# Patient Record
Sex: FEMALE | Race: WHITE | Employment: FULL TIME | URBAN - NONMETROPOLITAN AREA
[De-identification: names, ages, dates, MRNs, and addresses within clinical notes are randomized per-mention and may not be internally consistent; named-entity substitution may affect disease eponyms.]

---

## 2017-01-05 ENCOUNTER — MED REC SCAN ONLY (OUTPATIENT)
Dept: FAMILY MEDICINE CLINIC | Facility: CLINIC | Age: 57
End: 2017-01-05

## 2017-04-04 ENCOUNTER — OFFICE VISIT (OUTPATIENT)
Dept: FAMILY MEDICINE CLINIC | Facility: CLINIC | Age: 57
End: 2017-04-04

## 2017-04-04 VITALS
HEART RATE: 73 BPM | HEIGHT: 63 IN | TEMPERATURE: 99 F | DIASTOLIC BLOOD PRESSURE: 80 MMHG | BODY MASS INDEX: 21.17 KG/M2 | WEIGHT: 119.5 LBS | OXYGEN SATURATION: 99 % | SYSTOLIC BLOOD PRESSURE: 110 MMHG

## 2017-04-04 DIAGNOSIS — B34.9 VIRAL SYNDROME: Primary | ICD-10-CM

## 2017-04-04 PROCEDURE — 99213 OFFICE O/P EST LOW 20 MIN: CPT | Performed by: FAMILY MEDICINE

## 2017-04-04 RX ORDER — CODEINE PHOSPHATE AND GUAIFENESIN 10; 100 MG/5ML; MG/5ML
SOLUTION ORAL
Qty: 120 ML | Refills: 0 | Status: SHIPPED | OUTPATIENT
Start: 2017-04-04 | End: 2018-01-31 | Stop reason: ALTCHOICE

## 2017-04-04 NOTE — PROGRESS NOTES
Shavon Stallings is a 59year old female. Patient presents with: Other: sore throat, headache, fever,cough-did strep test in [place], came back negative. ..started on 4/1/17---room 1      HPI:   Patient is a .   She was recently in [place] Pharynx normal  NECK: supple, no cervical adenopathy  LUNGS: clear to auscultation  CARDIO: RRR without murmur  ABD soft, nontender, normal BS, no masses, rebound or guarding. No organomegaly or CVA tenderness.   EXTREMITIES: no edema          ASSESSMENT AN

## 2018-01-22 ENCOUNTER — TELEPHONE (OUTPATIENT)
Dept: FAMILY MEDICINE CLINIC | Facility: CLINIC | Age: 58
End: 2018-01-22

## 2018-01-22 NOTE — TELEPHONE ENCOUNTER
Pt wants the shingles and pneumonia shot, does she need to see [facility] or can she get a nurse appt? Her ins will cover some of the shots.

## 2018-01-22 NOTE — TELEPHONE ENCOUNTER
Patient has not been seen since April 2017  I will advise that she needs to be seen.        She will confirm with her insurance that they will be covered and then will call back to make an appointment

## 2018-01-31 ENCOUNTER — OFFICE VISIT (OUTPATIENT)
Dept: FAMILY MEDICINE CLINIC | Facility: CLINIC | Age: 58
End: 2018-01-31

## 2018-01-31 VITALS
TEMPERATURE: 99 F | BODY MASS INDEX: 21.62 KG/M2 | HEIGHT: 63 IN | SYSTOLIC BLOOD PRESSURE: 118 MMHG | WEIGHT: 122 LBS | DIASTOLIC BLOOD PRESSURE: 80 MMHG

## 2018-01-31 DIAGNOSIS — J02.9 PHARYNGITIS, UNSPECIFIED ETIOLOGY: Primary | ICD-10-CM

## 2018-01-31 PROCEDURE — 99213 OFFICE O/P EST LOW 20 MIN: CPT | Performed by: FAMILY MEDICINE

## 2018-01-31 PROCEDURE — 87081 CULTURE SCREEN ONLY: CPT | Performed by: FAMILY MEDICINE

## 2018-01-31 RX ORDER — AZITHROMYCIN 250 MG/1
TABLET, FILM COATED ORAL
Qty: 6 TABLET | Refills: 0 | Status: SHIPPED | OUTPATIENT
Start: 2018-01-31 | End: 2018-02-05

## 2018-01-31 NOTE — PROGRESS NOTES
Rizwan Julien is a 59year old female. Patient presents with: Other: sore throat, low grade fever, nothing over 100., started 1/19/18. Sons girlfriend has strep. room 1. HPI:   Patient has had a sore throat, low-grade fever.   Been exposed t CVA tenderness. EXTREMITIES: no edema          ASSESSMENT AND PLAN:     Pharyngitis, unspecified etiology  (primary encounter diagnosis)    Treat symptomatically. Rest, fluids and Tylenol.   Discussed danger signs including increased fever,chills, SOB and

## 2018-02-02 ENCOUNTER — TELEPHONE (OUTPATIENT)
Dept: FAMILY MEDICINE CLINIC | Facility: CLINIC | Age: 58
End: 2018-02-02

## 2018-02-02 NOTE — TELEPHONE ENCOUNTER
Pt calling back for strep test results, wants to know if these are in. Wants to get her allergy shots today and wondering if this will interfere.

## 2018-02-02 NOTE — TELEPHONE ENCOUNTER
Result are still pending   Called the patient -     She is still going to go have her allergy shots  She wants to go see her mom this weekend and wants to make sure that she will be ok to go since she has been on the antibiotic since Wednesday?      I advis

## 2018-02-05 ENCOUNTER — OFFICE VISIT (OUTPATIENT)
Dept: FAMILY MEDICINE CLINIC | Facility: CLINIC | Age: 58
End: 2018-02-05

## 2018-02-05 VITALS
SYSTOLIC BLOOD PRESSURE: 120 MMHG | OXYGEN SATURATION: 99 % | BODY MASS INDEX: 22 KG/M2 | DIASTOLIC BLOOD PRESSURE: 80 MMHG | HEART RATE: 74 BPM | WEIGHT: 122 LBS | TEMPERATURE: 98 F

## 2018-02-05 DIAGNOSIS — J02.9 VIRAL PHARYNGITIS: Primary | ICD-10-CM

## 2018-02-05 DIAGNOSIS — Z00.00 PREVENTATIVE HEALTH CARE: ICD-10-CM

## 2018-02-05 PROCEDURE — 90670 PCV13 VACCINE IM: CPT | Performed by: FAMILY MEDICINE

## 2018-02-05 PROCEDURE — 99213 OFFICE O/P EST LOW 20 MIN: CPT | Performed by: FAMILY MEDICINE

## 2018-02-05 PROCEDURE — 90471 IMMUNIZATION ADMIN: CPT | Performed by: FAMILY MEDICINE

## 2018-02-05 NOTE — PROGRESS NOTES
Amy Mcallister is a 59year old female. Patient presents with: Other: IMMUNIZATIONS---- F/U ON SORE THROAT---- RM 1      HPI:   Patient's recent throat culture was negative. She is feeling better.   She still has some mild discomfort but overall h edema          ASSESSMENT AND PLAN:     Viral pharyngitis  (primary encounter diagnosis)  Preventative health care    Explained the difference between viral pharyngitis and bacterial.  No longer needs to stay on the antibiotic.   We will give her a Prevnar

## 2018-04-06 ENCOUNTER — TELEPHONE (OUTPATIENT)
Dept: FAMILY MEDICINE CLINIC | Facility: CLINIC | Age: 58
End: 2018-04-06

## 2018-04-06 NOTE — TELEPHONE ENCOUNTER
Looking to see if the new shingles vaccine is out yet? Dr. Sudhakar Mckinley told her to come back when the new one was out because it will be much more effective.

## 2018-04-06 NOTE — TELEPHONE ENCOUNTER
Calling the patient to advise that we have not received the new vaccine yet.    I will call her when it is received   She verbalized her understanding

## 2018-04-11 ENCOUNTER — TELEPHONE (OUTPATIENT)
Dept: FAMILY MEDICINE CLINIC | Facility: CLINIC | Age: 58
End: 2018-04-11

## 2018-04-16 ENCOUNTER — TELEPHONE (OUTPATIENT)
Dept: FAMILY MEDICINE CLINIC | Facility: CLINIC | Age: 58
End: 2018-04-16

## 2018-04-16 DIAGNOSIS — Z23 NEED FOR VACCINATION: Primary | ICD-10-CM

## 2018-04-18 ENCOUNTER — NURSE ONLY (OUTPATIENT)
Dept: FAMILY MEDICINE CLINIC | Facility: CLINIC | Age: 58
End: 2018-04-18

## 2018-04-18 DIAGNOSIS — Z23 NEED FOR SHINGLES VACCINE: Primary | ICD-10-CM

## 2018-04-18 PROCEDURE — 90471 IMMUNIZATION ADMIN: CPT

## 2018-04-18 PROCEDURE — 90750 HZV VACC RECOMBINANT IM: CPT

## 2018-07-12 ENCOUNTER — TELEPHONE (OUTPATIENT)
Dept: FAMILY MEDICINE CLINIC | Facility: CLINIC | Age: 58
End: 2018-07-12

## 2018-07-13 ENCOUNTER — TELEPHONE (OUTPATIENT)
Dept: FAMILY MEDICINE CLINIC | Facility: CLINIC | Age: 58
End: 2018-07-13

## 2018-07-13 ENCOUNTER — NURSE ONLY (OUTPATIENT)
Dept: FAMILY MEDICINE CLINIC | Facility: CLINIC | Age: 58
End: 2018-07-13

## 2018-07-13 DIAGNOSIS — Z23 NEED FOR SHINGLES VACCINE: Primary | ICD-10-CM

## 2018-07-13 PROCEDURE — 90750 HZV VACC RECOMBINANT IM: CPT | Performed by: FAMILY MEDICINE

## 2018-07-13 PROCEDURE — 90471 IMMUNIZATION ADMIN: CPT | Performed by: FAMILY MEDICINE

## 2018-09-30 NOTE — TELEPHONE ENCOUNTER
----- Message from Rodolfo Schroeder RN sent at 7/12/2018 11:06 AM CDT -----  OK to schedule second Shingrix. Have doctor place order if patient schedules.
Calling the patient- left detailed message
- - -

## 2018-10-10 ENCOUNTER — TELEPHONE (OUTPATIENT)
Dept: FAMILY MEDICINE CLINIC | Facility: CLINIC | Age: 58
End: 2018-10-10

## 2018-10-10 ENCOUNTER — OFFICE VISIT (OUTPATIENT)
Dept: FAMILY MEDICINE CLINIC | Facility: CLINIC | Age: 58
End: 2018-10-10
Payer: COMMERCIAL

## 2018-10-10 VITALS
DIASTOLIC BLOOD PRESSURE: 80 MMHG | WEIGHT: 124 LBS | HEART RATE: 76 BPM | BODY MASS INDEX: 22.25 KG/M2 | HEIGHT: 62.5 IN | SYSTOLIC BLOOD PRESSURE: 130 MMHG | TEMPERATURE: 99 F | OXYGEN SATURATION: 99 %

## 2018-10-10 DIAGNOSIS — K57.92 DIVERTICULITIS: Primary | ICD-10-CM

## 2018-10-10 DIAGNOSIS — R10.30 LOWER ABDOMINAL PAIN: Primary | ICD-10-CM

## 2018-10-10 PROCEDURE — 99214 OFFICE O/P EST MOD 30 MIN: CPT | Performed by: FAMILY MEDICINE

## 2018-10-10 NOTE — TELEPHONE ENCOUNTER
Calling to do prior auth- spoke to [place]  CT Abd/Pelvis does require prior Marc Cooley ([id number])    Case # [id number]  The test has been approved, but they will not give me me the authorization # until they speak directly with the patient regarding locations that w

## 2018-10-10 NOTE — TELEPHONE ENCOUNTER
I called the patient -     She is having her test done @ [facility]. Because of her age she needs to come in for a BUN/Creatinine. She is asking if there is any additional bloodwork that Dr Escobar Presume will want/need.      I will check and a

## 2018-10-10 NOTE — TELEPHONE ENCOUNTER
Pt. Kenneth Shaw she can go to [facility] on Friday as early as possible. She cant go to [address] [place] [address] today.  RODRIGO

## 2018-10-10 NOTE — TELEPHONE ENCOUNTER
Jerona Prader called and advised that she is going to [facility] in [place].    He is asking that I fax the order to [phone]    He will call to schedule for the patient

## 2018-10-10 NOTE — TELEPHONE ENCOUNTER
Is Friday ok if [facility] can't get scheduled [facility] [address]?

## 2018-10-10 NOTE — TELEPHONE ENCOUNTER
I called the patient and she will call the insurance  If they give her the auth # she will call me back and then I will schedule

## 2018-10-10 NOTE — TELEPHONE ENCOUNTER
Patient called and she advised that Leanne Rodriguez from the insurance is going to schedule her appointment in [facility] will call me directly to give me the information.

## 2018-10-10 NOTE — PROGRESS NOTES
Ary Lomax is a 59year old female. Patient presents with: Other: pt tried giving blood a couple months ago, and had slight fever. ..also has been having fevers on and off (low grade)-gets nausea and abdominal tenderness when she notices the fe °C) (Tympanic)   Ht 62.5\"   Wt 124 lb   SpO2 99%   BMI 22.32 kg/m²   GENERAL: well developed, well nourished,in no apparent distress  SKIN: no rashes,no suspicious lesions  HEENT: atraumatic, normocephalic, R TM normal, L TM normal, Pharynx normal  NECK:

## 2018-10-12 ENCOUNTER — TELEPHONE (OUTPATIENT)
Dept: FAMILY MEDICINE CLINIC | Facility: CLINIC | Age: 58
End: 2018-10-12

## 2018-10-12 ENCOUNTER — LABORATORY ENCOUNTER (OUTPATIENT)
Dept: LAB | Age: 58
End: 2018-10-12
Attending: FAMILY MEDICINE
Payer: COMMERCIAL

## 2018-10-12 DIAGNOSIS — R10.30 LOWER ABDOMINAL PAIN: ICD-10-CM

## 2018-10-12 PROCEDURE — 85025 COMPLETE CBC W/AUTO DIFF WBC: CPT | Performed by: FAMILY MEDICINE

## 2018-10-12 PROCEDURE — 36415 COLL VENOUS BLD VENIPUNCTURE: CPT | Performed by: FAMILY MEDICINE

## 2018-10-12 PROCEDURE — 82565 ASSAY OF CREATININE: CPT | Performed by: FAMILY MEDICINE

## 2018-10-12 PROCEDURE — 84520 ASSAY OF UREA NITROGEN: CPT | Performed by: FAMILY MEDICINE

## 2018-10-12 NOTE — TELEPHONE ENCOUNTER
Fax received from [facility] regarding CT Abd/Pelvis with contrast.  States, \"After a review of the information submitted, we have determined that the requested services are medically necessary.   The request has been approved as medically necessary for the a

## 2018-10-15 ENCOUNTER — TELEPHONE (OUTPATIENT)
Dept: FAMILY MEDICINE CLINIC | Facility: CLINIC | Age: 58
End: 2018-10-15

## 2018-10-15 NOTE — TELEPHONE ENCOUNTER
I spoke to John Lima- she advised that they do not have any orders for the patient. I need to fax the order/demographics and she asked that I refax the lab results with the order.      They will call the patient to schedule       Calling the patient to advsylvia

## 2018-10-15 NOTE — TELEPHONE ENCOUNTER
ON VOICEMAIL+   PATIENT HAS QUESTION ABOUT MEDICATION TO TAKE PRIOR TO CT SCAN, AND QUESTION ABOUT CT SCAN  PLEASE CALL

## 2018-10-15 NOTE — TELEPHONE ENCOUNTER
I called the patient and she was f/up to see where things were at with the CT scan?    She wants to be able to  the contrast locally otherwise she can pick it up this Thursday at the facility where she is having the test but she wasn't sure how long

## 2018-10-23 ENCOUNTER — TELEPHONE (OUTPATIENT)
Dept: FAMILY MEDICINE CLINIC | Facility: CLINIC | Age: 58
End: 2018-10-23

## 2018-10-23 NOTE — TELEPHONE ENCOUNTER
Pt wants Katiuska Hatch to know she  will be dropping of a CD of her CT Scan that she had done today in about 30 min. Pt wants to say Thanks for all your help.

## 2018-10-23 NOTE — TELEPHONE ENCOUNTER
Calling the patient to advise that we only need the written report.  We do not have a way to download the images from the CD    She advised that they told her that the results should be available by tomorrow - the patient does fly out on Thursday

## 2018-10-25 ENCOUNTER — TELEPHONE (OUTPATIENT)
Dept: FAMILY MEDICINE CLINIC | Facility: CLINIC | Age: 58
End: 2018-10-25

## 2018-10-25 NOTE — TELEPHONE ENCOUNTER
HAD CT DONE @ [facility] ON MONDAY, CALLING FOR RESULTS, CALL HER BEFORE 5PM TODAY, OTHERWISE WAIT UNTIL TOMORROW

## 2018-10-25 NOTE — TELEPHONE ENCOUNTER
Phone call to [facility] [address] [phone]. They will fax report.

## 2019-03-13 ENCOUNTER — TELEPHONE (OUTPATIENT)
Dept: FAMILY MEDICINE CLINIC | Facility: CLINIC | Age: 59
End: 2019-03-13

## 2019-03-13 VITALS — DIASTOLIC BLOOD PRESSURE: 70 MMHG | SYSTOLIC BLOOD PRESSURE: 122 MMHG

## 2019-03-13 LAB — AMB EXT GLUCOSE: 99 MG/DL

## 2019-04-24 ENCOUNTER — OFFICE VISIT (OUTPATIENT)
Dept: FAMILY MEDICINE CLINIC | Facility: CLINIC | Age: 59
End: 2019-04-24
Payer: COMMERCIAL

## 2019-04-24 ENCOUNTER — LABORATORY ENCOUNTER (OUTPATIENT)
Dept: LAB | Age: 59
End: 2019-04-24
Attending: FAMILY MEDICINE
Payer: COMMERCIAL

## 2019-04-24 VITALS
SYSTOLIC BLOOD PRESSURE: 116 MMHG | HEIGHT: 63 IN | OXYGEN SATURATION: 99 % | TEMPERATURE: 98 F | WEIGHT: 123 LBS | BODY MASS INDEX: 21.79 KG/M2 | DIASTOLIC BLOOD PRESSURE: 80 MMHG | HEART RATE: 68 BPM

## 2019-04-24 DIAGNOSIS — Z01.419 ROUTINE GYNECOLOGICAL EXAMINATION: ICD-10-CM

## 2019-04-24 DIAGNOSIS — Z00.00 GENERAL MEDICAL EXAMINATION: Primary | ICD-10-CM

## 2019-04-24 DIAGNOSIS — Z83.3 FAMILY HISTORY OF DIABETES MELLITUS (DM): ICD-10-CM

## 2019-04-24 DIAGNOSIS — Z00.00 GENERAL MEDICAL EXAMINATION: ICD-10-CM

## 2019-04-24 DIAGNOSIS — B37.9: ICD-10-CM

## 2019-04-24 DIAGNOSIS — F41.9 ANXIETY: ICD-10-CM

## 2019-04-24 PROCEDURE — 88175 CYTOPATH C/V AUTO FLUID REDO: CPT | Performed by: NURSE PRACTITIONER

## 2019-04-24 PROCEDURE — 80061 LIPID PANEL: CPT | Performed by: NURSE PRACTITIONER

## 2019-04-24 PROCEDURE — 87624 HPV HI-RISK TYP POOLED RSLT: CPT | Performed by: NURSE PRACTITIONER

## 2019-04-24 PROCEDURE — 99396 PREV VISIT EST AGE 40-64: CPT | Performed by: NURSE PRACTITIONER

## 2019-04-24 PROCEDURE — 83036 HEMOGLOBIN GLYCOSYLATED A1C: CPT | Performed by: NURSE PRACTITIONER

## 2019-04-24 PROCEDURE — 36415 COLL VENOUS BLD VENIPUNCTURE: CPT | Performed by: NURSE PRACTITIONER

## 2019-04-24 PROCEDURE — 80050 GENERAL HEALTH PANEL: CPT | Performed by: NURSE PRACTITIONER

## 2019-04-24 RX ORDER — NYSTATIN 500000 [USP'U]/1
500000 TABLET, COATED ORAL DAILY
COMMUNITY
Start: 2016-08-10 | End: 2019-04-24

## 2019-04-24 RX ORDER — BUPROPION HYDROCHLORIDE 150 MG/1
TABLET, EXTENDED RELEASE ORAL
Refills: 0 | Status: CANCELLED | OUTPATIENT
Start: 2019-04-24

## 2019-04-24 RX ORDER — NYSTATIN 500000 [USP'U]/1
500000 TABLET, COATED ORAL DAILY
Qty: 90 TABLET | Refills: 3 | Status: SHIPPED | OUTPATIENT
Start: 2019-04-24 | End: 2019-07-23

## 2019-04-24 RX ORDER — BUPROPION HYDROCHLORIDE 150 MG/1
150 TABLET, EXTENDED RELEASE ORAL DAILY
Qty: 90 TABLET | Refills: 3 | Status: SHIPPED | OUTPATIENT
Start: 2019-04-24 | End: 2020-05-13 | Stop reason: ALTCHOICE

## 2019-04-29 DIAGNOSIS — Z12.31 SCREENING MAMMOGRAM, ENCOUNTER FOR: Primary | ICD-10-CM

## 2019-05-29 ENCOUNTER — TELEPHONE (OUTPATIENT)
Dept: FAMILY MEDICINE CLINIC | Facility: CLINIC | Age: 59
End: 2019-05-29

## 2019-05-29 NOTE — TELEPHONE ENCOUNTER
Mammogram ordered 4/24/19. Patient has not scheduled. Please call patient to follow up.   (PSRs can schedule this for patient.)

## 2019-05-31 NOTE — TELEPHONE ENCOUNTER
Pt stated she has her mammogram scheduled for 6/18/19 at [facility] [address]

## 2019-09-04 NOTE — TELEPHONE ENCOUNTER
Estradiol-Norethindrone Acet (Patch Biweekly)  PLEASE SEND REFILL TO [facility]

## 2019-09-11 ENCOUNTER — MED REC SCAN ONLY (OUTPATIENT)
Dept: FAMILY MEDICINE CLINIC | Facility: CLINIC | Age: 59
End: 2019-09-11

## 2019-09-12 NOTE — TELEPHONE ENCOUNTER
Combipatch refill request.     Last office visit: 4/24/2019 - Norris Olszewski, APRN  Last refill: historical  Labs due: 10/25/2019    No future appointments. Contacted patient in regards to medication as we have never prescribed this medication.  She stat

## 2019-11-08 ENCOUNTER — TELEPHONE (OUTPATIENT)
Dept: FAMILY MEDICINE CLINIC | Facility: CLINIC | Age: 59
End: 2019-11-08

## 2019-11-08 NOTE — TELEPHONE ENCOUNTER
Patient is asking if she is due for pneumonia vaccine? She has had both shingrix immunizations. Routed to Dr. Sudhakar Mckinley.

## 2019-11-08 NOTE — TELEPHONE ENCOUNTER
Jose Patton was wondering if she is due for the Shingles and PNEUMOCOCCAL immunization, she would like to come in to get them when she sets up for her flu shot

## 2019-12-09 ENCOUNTER — TELEPHONE (OUTPATIENT)
Dept: FAMILY MEDICINE CLINIC | Facility: CLINIC | Age: 59
End: 2019-12-09

## 2019-12-09 ENCOUNTER — MED REC SCAN ONLY (OUTPATIENT)
Dept: FAMILY MEDICINE CLINIC | Facility: CLINIC | Age: 59
End: 2019-12-09

## 2019-12-09 DIAGNOSIS — E78.2 MIXED HYPERLIPIDEMIA: Primary | ICD-10-CM

## 2019-12-09 NOTE — TELEPHONE ENCOUNTER
We faxed the order back on 5/8/19. I will call the patient to confirm if she ever received the kit and will f/up with [employer] if needed.        I called the patient and she advised that she did receive the kit and she sent it back in a few months ago

## 2019-12-09 NOTE — TELEPHONE ENCOUNTER
6678 Nathaniel Faye spoke to [place]. He advised that they either did not receive the kit or the specimen was not adequate. They will send a new kit to the patient.  She needs to call them - she needs to speak with patient support and they can help

## 2020-02-12 ENCOUNTER — MED REC SCAN ONLY (OUTPATIENT)
Dept: FAMILY MEDICINE CLINIC | Facility: CLINIC | Age: 60
End: 2020-02-12

## 2020-03-13 ENCOUNTER — TELEPHONE (OUTPATIENT)
Dept: FAMILY MEDICINE CLINIC | Facility: CLINIC | Age: 60
End: 2020-03-13

## 2020-03-23 ENCOUNTER — TELEPHONE (OUTPATIENT)
Dept: FAMILY MEDICINE CLINIC | Facility: CLINIC | Age: 60
End: 2020-03-23

## 2020-03-23 RX ORDER — ALBUTEROL SULFATE 2.5 MG/3ML
2.5 SOLUTION RESPIRATORY (INHALATION) 4 TIMES DAILY
Qty: 50 VIAL | Refills: 0 | Status: SHIPPED | OUTPATIENT
Start: 2020-03-23 | End: 2020-04-03

## 2020-03-23 NOTE — TELEPHONE ENCOUNTER
Per Dr Rico Locks- start albuterol via nebulizer QID for wheezing. If symptoms worsen, difficulty breathing fever- to ER  Expresses understanding.

## 2020-03-23 NOTE — TELEPHONE ENCOUNTER
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 She started last week with cold symptoms, feels a little wheezy, sneezing, bodyaches, ears feel like need to pop. T-98.5 Concerned is she carrier, where to go to be tested? Has home neb.   Uses [facility]

## 2020-03-23 NOTE — TELEPHONE ENCOUNTER
ASTHMA, LOW GRADE FEVER, HEADACHE, CHEST TIGHTNESS, DID FLY LAST WEEK, WANTS TO KNOW WHERE TO GO TO GET TESTED?

## 2020-04-01 ENCOUNTER — TELEPHONE (OUTPATIENT)
Dept: FAMILY MEDICINE CLINIC | Facility: CLINIC | Age: 60
End: 2020-04-01

## 2020-04-01 NOTE — TELEPHONE ENCOUNTER
Okay to refill albuterol nebulizer solution as requested. However, if she is wheezing given the fact that she is highly allergic. I would recommend she also use an inhaled corticosteroid such as fluticasone  mcg 2 puffs twice daily, rinse mouth after use. I would also ask her to carry a albuterol MDI that she may use 2 puffs up to every 4 hours as needed for cough or wheezing. Simply coughing and wheezing in somebody who is highly allergic is not an indication for COVID-19 testing.   She does not meet the criteria for the limited testing supplies that are available currently

## 2020-04-01 NOTE — TELEPHONE ENCOUNTER
albuterol sulfate (2.5 MG/3ML) 0.083% Inhalation Dariana John     Also states if she is still wheezing by Monday she wants to be tested for COVID-19 still no other symptoms just wheezing and cough     [facility]

## 2020-04-03 RX ORDER — ALBUTEROL SULFATE 2.5 MG/3ML
SOLUTION RESPIRATORY (INHALATION)
Qty: 75 ML | Refills: 0 | Status: SHIPPED | OUTPATIENT
Start: 2020-04-03

## 2020-04-27 ENCOUNTER — VIRTUAL PHONE E/M (OUTPATIENT)
Dept: FAMILY MEDICINE CLINIC | Facility: CLINIC | Age: 60
End: 2020-04-27
Payer: COMMERCIAL

## 2020-04-27 ENCOUNTER — TELEPHONE (OUTPATIENT)
Dept: FAMILY MEDICINE CLINIC | Facility: CLINIC | Age: 60
End: 2020-04-27

## 2020-04-27 DIAGNOSIS — J45.21 MILD INTERMITTENT REACTIVE AIRWAY DISEASE WITH ACUTE EXACERBATION: Primary | ICD-10-CM

## 2020-04-27 PROCEDURE — 99213 OFFICE O/P EST LOW 20 MIN: CPT | Performed by: FAMILY MEDICINE

## 2020-04-27 RX ORDER — PREDNISONE 20 MG/1
20 TABLET ORAL 2 TIMES DAILY
Qty: 14 TABLET | Refills: 0 | Status: SHIPPED | OUTPATIENT
Start: 2020-04-27 | End: 2020-05-04

## 2020-04-27 NOTE — PROGRESS NOTES
Telemedicine Visit    Memory Person  verbally consents to a Telemedicine service on 4/27/2020 . Patient understands and accepts financial responsibility for any deductible, co-insurance and/or co-pays associated with this service.     Duration of t Problem Relation Age of Onset   • Diabetes Father    • Cancer Sister       Social History    Tobacco Use      Smoking status: Never Smoker      Smokeless tobacco: Never Used    Alcohol use: Not on file    Drug use: Not on file        REVIEW OF SYSTEMS: for 7 days. Imaging & Consults:  None    No follow-ups on file. Renetta Levine D.O. Please note that the visit was completed using two-way, real-time interactive audio and/or video communication.   This has been done in [place] to

## 2020-04-27 NOTE — TELEPHONE ENCOUNTER
Virtual/Telephone Check-In    [employer] verbally {consents to a [employer] on 04/27/20.   Patient understands and accepts financial responsibility for any deductible, co-insurance and/or co-pays associated with this servi

## 2020-05-04 ENCOUNTER — TELEPHONE (OUTPATIENT)
Dept: FAMILY MEDICINE CLINIC | Facility: CLINIC | Age: 60
End: 2020-05-04

## 2020-05-04 DIAGNOSIS — J45.21 MILD INTERMITTENT REACTIVE AIRWAY DISEASE WITH ACUTE EXACERBATION: Primary | ICD-10-CM

## 2020-05-04 RX ORDER — AZITHROMYCIN 250 MG/1
TABLET, FILM COATED ORAL
Qty: 6 TABLET | Refills: 0 | Status: SHIPPED | OUTPATIENT
Start: 2020-05-04 | End: 2020-05-09

## 2020-05-04 NOTE — TELEPHONE ENCOUNTER
DID PHONE VISIT 4/27, STILL HAVING SOB EVEN AFTER FINISHING PREDNISONE, NOT SURE IF SHE SHOULD GET TESTED FOR COVID?

## 2020-05-04 NOTE — TELEPHONE ENCOUNTER
Please call in a Zithromax Z-SHA. Continue the albuterol 4 times a day. If not improved over the next 2 to 3 days or she is any worse prior, needs to let me know.

## 2020-05-04 NOTE — TELEPHONE ENCOUNTER
UPDATE FROM PATIENT- SHE REPORTS WHILE ON PREDNISONE HAD 3 DAYS THAT SHE FELT COULD BREATHE BETTER; FELT MORE ENERGETIC. SHE IS DOING ALBUTEROL VIA NEB BID. NO FEVER. WONDERING IF NEEDS AN ANTIBIOTIC?    Allergy- PEANUT OIL, PCN, AND CAT HAIR.  USES [facility]

## 2020-05-08 ENCOUNTER — TELEPHONE (OUTPATIENT)
Dept: FAMILY MEDICINE CLINIC | Facility: CLINIC | Age: 60
End: 2020-05-08

## 2020-05-08 DIAGNOSIS — J45.21 MILD INTERMITTENT REACTIVE AIRWAY DISEASE WITH ACUTE EXACERBATION: Primary | ICD-10-CM

## 2020-05-08 RX ORDER — BUDESONIDE 0.5 MG/2ML
0.5 INHALANT ORAL 2 TIMES DAILY
Qty: 30 AMPULE | Refills: 0 | Status: SHIPPED | OUTPATIENT
Start: 2020-05-08

## 2020-05-08 RX ORDER — BUDESONIDE 1 MG/2ML
1 INHALANT ORAL 2 TIMES DAILY
Qty: 60 EACH | Refills: 0 | Status: SHIPPED | OUTPATIENT
Start: 2020-05-08 | End: 2020-05-08 | Stop reason: ALTCHOICE

## 2020-05-08 NOTE — TELEPHONE ENCOUNTER
Patient is still having shortness of breath; notice speaking to her at times will take a sighing deep breath. The best she felt was when on Prednisone. Told her no results yet on Covid testing.   This RN called [facility], [phone], and spoke

## 2020-05-08 NOTE — TELEPHONE ENCOUNTER
WENT TO [facility] TO GET TESTED FOR COVID19, STILL AWAITING HER RESULTS, HER PAPER SAYS PCP WITH NOTIFY HER OF RESULTS, SHE IS STILL HAVING SOB

## 2020-05-08 NOTE — TELEPHONE ENCOUNTER
I would rather not put her on another round of prednisone especially if there is a possibility she has COVID. Continue the albuterol. Please also send in a prescription for Pulmicort which she can add to her nebulizer.   She can use the Pulmicort twice a

## 2020-05-08 NOTE — TELEPHONE ENCOUNTER
Spoke with pharmacy. Budesonide 1 mg/2mL not available there or at [place]. They do have the 0.5 mg/2mL dose. Okay to switch?

## 2020-05-11 ENCOUNTER — TELEPHONE (OUTPATIENT)
Dept: FAMILY MEDICINE CLINIC | Facility: CLINIC | Age: 60
End: 2020-05-11

## 2020-05-11 NOTE — TELEPHONE ENCOUNTER
Spoke with pt. States she was tested for Covid on 5/7 @ [address] [place] [address]---asks if we have rec'd results? Advised no. She will call them for results---[facility] ph# is [phone]. She will c/b if results are +.   States she is still ha

## 2020-05-12 ENCOUNTER — TELEPHONE (OUTPATIENT)
Dept: FAMILY MEDICINE CLINIC | Facility: CLINIC | Age: 60
End: 2020-05-12

## 2020-05-12 NOTE — TELEPHONE ENCOUNTER
OV scheduled with Dr. Shahriar Thorpe on 5/13. Copy of neg covid results from [facility] on Dr. Pooja hgigins for review.

## 2020-05-12 NOTE — TELEPHONE ENCOUNTER
Pt tested negative for covid, she wants to come into the office so someone can listen to her. She is SOB and wheezing.

## 2020-05-12 NOTE — TELEPHONE ENCOUNTER
[place] [id number] did a phone visit with pt on 4/27. We did receive neg Covid results today from [facility].    Pt wants to be seen for below------can she see someone else? Since her test is neg?   Or should I ask whatever Doctor if it's okay with

## 2020-05-13 ENCOUNTER — TELEPHONE (OUTPATIENT)
Dept: FAMILY MEDICINE CLINIC | Facility: CLINIC | Age: 60
End: 2020-05-13

## 2020-05-13 ENCOUNTER — OFFICE VISIT (OUTPATIENT)
Dept: FAMILY MEDICINE CLINIC | Facility: CLINIC | Age: 60
End: 2020-05-13
Payer: COMMERCIAL

## 2020-05-13 VITALS
BODY MASS INDEX: 21.44 KG/M2 | HEIGHT: 62.5 IN | RESPIRATION RATE: 12 BRPM | WEIGHT: 119.5 LBS | SYSTOLIC BLOOD PRESSURE: 118 MMHG | OXYGEN SATURATION: 98 % | TEMPERATURE: 98 F | HEART RATE: 94 BPM | DIASTOLIC BLOOD PRESSURE: 82 MMHG

## 2020-05-13 DIAGNOSIS — J45.21 MILD INTERMITTENT REACTIVE AIRWAY DISEASE WITH ACUTE EXACERBATION: ICD-10-CM

## 2020-05-13 DIAGNOSIS — R53.83 OTHER FATIGUE: ICD-10-CM

## 2020-05-13 DIAGNOSIS — R06.02 SHORTNESS OF BREATH: Primary | ICD-10-CM

## 2020-05-13 PROCEDURE — 99214 OFFICE O/P EST MOD 30 MIN: CPT | Performed by: FAMILY MEDICINE

## 2020-05-13 RX ORDER — NYSTATIN 500000 [USP'U]/1
TABLET, COATED ORAL
COMMUNITY
Start: 2020-04-15 | End: 2020-07-15

## 2020-05-13 RX ORDER — MONTELUKAST SODIUM 10 MG/1
TABLET ORAL
COMMUNITY
Start: 2020-02-23 | End: 2020-05-13 | Stop reason: ALTCHOICE

## 2020-05-13 NOTE — PROGRESS NOTES
Jacobo Buchanan is a 56year old female. Patient presents with:  Shortness Of Breath: inrm . 5      Chief Complaint Reviewed and Verified  Nursing Notes Reviewed and   Verified  Tobacco Reviewed  Allergies Reviewed  Medications Reviewed    Problem Elva Murillo Never       BP Readings from Last 6 Encounters:  05/13/20 : 118/82  04/24/19 : 116/80  03/13/19 : 122/70  10/10/18 : 130/80  02/05/18 : 120/80  01/31/18 : 118/80      Wt Readings from Last 6 Encounters:  05/13/20 : 119 lb 8 oz (54.2 kg)  04/24/19 : 123 lb THYROID STIM HORMONE    XR CHEST PA + LAT CHEST (CPT=71046)          Return if symptoms worsen or fail to improve.       Meds & Refills for this Visit:  Requested Prescriptions      No prescriptions requested or ordered in this encounter       Malissa Villalobos

## 2020-05-15 ENCOUNTER — MED REC SCAN ONLY (OUTPATIENT)
Dept: FAMILY MEDICINE CLINIC | Facility: CLINIC | Age: 60
End: 2020-05-15

## 2020-05-15 ENCOUNTER — HOSPITAL ENCOUNTER (OUTPATIENT)
Dept: GENERAL RADIOLOGY | Age: 60
Discharge: HOME | End: 2020-05-15
Attending: FAMILY MEDICINE
Payer: COMMERCIAL

## 2020-05-15 DIAGNOSIS — J45.21 MILD INTERMITTENT REACTIVE AIRWAY DISEASE WITH ACUTE EXACERBATION: ICD-10-CM

## 2020-05-15 DIAGNOSIS — R53.83 OTHER FATIGUE: ICD-10-CM

## 2020-05-15 DIAGNOSIS — R06.02 SHORTNESS OF BREATH: ICD-10-CM

## 2020-05-15 PROCEDURE — 71046 X-RAY EXAM CHEST 2 VIEWS: CPT | Performed by: FAMILY MEDICINE

## 2020-05-18 ENCOUNTER — TELEPHONE (OUTPATIENT)
Dept: FAMILY MEDICINE CLINIC | Facility: CLINIC | Age: 60
End: 2020-05-18

## 2020-05-18 DIAGNOSIS — J45.21 MILD INTERMITTENT REACTIVE AIRWAY DISEASE WITH ACUTE EXACERBATION: Primary | ICD-10-CM

## 2020-05-18 DIAGNOSIS — R06.02 SHORTNESS OF BREATH: ICD-10-CM

## 2020-05-18 RX ORDER — FLUTICASONE PROPIONATE AND SALMETEROL 250; 50 UG/1; UG/1
1 POWDER RESPIRATORY (INHALATION) EVERY 12 HOURS SCHEDULED
Qty: 1 EACH | Refills: 0 | Status: SHIPPED | OUTPATIENT
Start: 2020-05-18 | End: 2020-06-17

## 2020-05-18 NOTE — TELEPHONE ENCOUNTER
HAS ALREADY HAD CHEST XRAYS DONE, WANTS TO KNOW WITH THE SOB SHE HAS BEEN HAVING IS THERE A INHALER OR MEDICATION SHE CAN TRY BEFORE HAVING A CT OR MORE TESTING DONE?

## 2020-05-18 NOTE — TELEPHONE ENCOUNTER
Dr Rosa Isela Herrera  Pulmonology  [address] [place] [address] [address] [facility], [address] [place] [address] [phone]

## 2020-05-18 NOTE — TELEPHONE ENCOUNTER
I talked with Tru Aiken. She had an xray on 5/13 for 2 month hx of worsening cough and SOB. She is a  and is currently on a leave of absence.  She found minimal improvement with inhaled Budesonide but it was not the dose Dr. Wilfredo Rodgers had initiall

## 2020-05-18 NOTE — TELEPHONE ENCOUNTER
Magno Manzano called [place] [address] office and he is not accepting any new pts. Also the doctor he is associated with is not taking anyone until July. Wanted us aware of this she was hoping to get this figured out sooner.

## 2020-06-11 ENCOUNTER — TELEPHONE (OUTPATIENT)
Dept: FAMILY MEDICINE CLINIC | Facility: CLINIC | Age: 60
End: 2020-06-11

## 2020-06-11 NOTE — TELEPHONE ENCOUNTER
Sudhir Ayoub MD (Attending)  [phone] (Work)  [phone] (Fax)  [phone] [facility]  [address] [place] ,[facility] 100, [address]  Pulmonary Disease  Patient having trouble getting through to above doctor's office. Told her to keep trying.   I did fax note te

## 2020-06-11 NOTE — TELEPHONE ENCOUNTER
SAW PULMONOLOGIST-DR RITTER, HE WAS ORDERING CT, BUT PT NEVER HEARD WHERE SHE NEEDS TO GO TO HAVE DONE?

## 2020-06-16 ENCOUNTER — TELEPHONE (OUTPATIENT)
Dept: FAMILY MEDICINE CLINIC | Facility: CLINIC | Age: 60
End: 2020-06-16

## 2020-06-16 NOTE — TELEPHONE ENCOUNTER
Pt needs CT scan of chest; states it was recommended by Dr. Michelle Rose. She is having difficulty getting through to the office to check on status. Called [phone] and spoke to Bishop Gutierrez from [facility] referral department.  They are still waiting on g

## 2020-07-15 DIAGNOSIS — F41.9 ANXIETY: ICD-10-CM

## 2020-07-15 RX ORDER — NYSTATIN 500000 [USP'U]/1
TABLET, COATED ORAL
Qty: 90 TABLET | Refills: 3 | Status: SHIPPED | OUTPATIENT
Start: 2020-07-15 | End: 2021-08-16

## 2020-07-15 RX ORDER — BUPROPION HYDROCHLORIDE 150 MG/1
TABLET, EXTENDED RELEASE ORAL
Qty: 90 TABLET | Refills: 3 | Status: SHIPPED | OUTPATIENT
Start: 2020-07-15 | End: 2021-04-26

## 2020-07-15 NOTE — TELEPHONE ENCOUNTER
Last OV: 5/13/20  Last refill of Nystatin on 4/15/20  Last refill of Bupropion on 4/24/19 #90 Tablets w/ 3 refills  The source prescription was discontinued on 5/13/2020 by Robert Patel MD for the following reason: Therapy completed.          Requested Pr

## 2020-08-14 ENCOUNTER — OFFICE VISIT (OUTPATIENT)
Dept: FAMILY MEDICINE CLINIC | Facility: CLINIC | Age: 60
End: 2020-08-14
Payer: COMMERCIAL

## 2020-08-14 VITALS
HEART RATE: 70 BPM | OXYGEN SATURATION: 95 % | BODY MASS INDEX: 20.84 KG/M2 | DIASTOLIC BLOOD PRESSURE: 66 MMHG | HEIGHT: 62.75 IN | TEMPERATURE: 99 F | SYSTOLIC BLOOD PRESSURE: 110 MMHG | WEIGHT: 116.13 LBS

## 2020-08-14 DIAGNOSIS — E78.2 MIXED HYPERLIPIDEMIA: ICD-10-CM

## 2020-08-14 DIAGNOSIS — F41.9 ANXIETY: ICD-10-CM

## 2020-08-14 DIAGNOSIS — Z13.820 SCREENING FOR OSTEOPOROSIS: ICD-10-CM

## 2020-08-14 DIAGNOSIS — M85.859 OSTEOPENIA OF HIP, UNSPECIFIED LATERALITY: ICD-10-CM

## 2020-08-14 DIAGNOSIS — R14.0 ABDOMINAL BLOATING: ICD-10-CM

## 2020-08-14 DIAGNOSIS — Z01.419 ROUTINE GYNECOLOGICAL EXAMINATION: ICD-10-CM

## 2020-08-14 DIAGNOSIS — Z00.00 ROUTINE MEDICAL EXAM: Primary | ICD-10-CM

## 2020-08-14 DIAGNOSIS — Z23 NEED FOR VACCINATION: ICD-10-CM

## 2020-08-14 DIAGNOSIS — J30.1 SEASONAL ALLERGIC RHINITIS DUE TO POLLEN: ICD-10-CM

## 2020-08-14 DIAGNOSIS — Z12.31 ENCOUNTER FOR SCREENING MAMMOGRAM FOR MALIGNANT NEOPLASM OF BREAST: ICD-10-CM

## 2020-08-14 DIAGNOSIS — N95.1 SYMPTOMATIC MENOPAUSAL OR FEMALE CLIMACTERIC STATES: ICD-10-CM

## 2020-08-14 PROCEDURE — 90471 IMMUNIZATION ADMIN: CPT | Performed by: NURSE PRACTITIONER

## 2020-08-14 PROCEDURE — 3078F DIAST BP <80 MM HG: CPT | Performed by: NURSE PRACTITIONER

## 2020-08-14 PROCEDURE — 99396 PREV VISIT EST AGE 40-64: CPT | Performed by: NURSE PRACTITIONER

## 2020-08-14 PROCEDURE — 88175 CYTOPATH C/V AUTO FLUID REDO: CPT | Performed by: NURSE PRACTITIONER

## 2020-08-14 PROCEDURE — 87624 HPV HI-RISK TYP POOLED RSLT: CPT | Performed by: NURSE PRACTITIONER

## 2020-08-14 PROCEDURE — 3008F BODY MASS INDEX DOCD: CPT | Performed by: NURSE PRACTITIONER

## 2020-08-14 PROCEDURE — 90715 TDAP VACCINE 7 YRS/> IM: CPT | Performed by: NURSE PRACTITIONER

## 2020-08-14 PROCEDURE — 3074F SYST BP LT 130 MM HG: CPT | Performed by: NURSE PRACTITIONER

## 2020-08-14 NOTE — PROGRESS NOTES
HPI:   Patient is here for physical and pap. She also needs a Tdap vaccine. Her niece is having another child in October and she wants to make sure she is okay to be around the . Additionally would like testing for gluten allergy.  If she does not ea sugar closely     REVIEW OF SYSTEMS:   Review of Systems   Constitutional: Negative for appetite change, chills, fatigue, fever and unexpected weight change.    HENT: Negative for congestion, ear pain, postnasal drip, rhinorrhea, sore throat, trouble swallo normal.   Neck: Normal range of motion. No thyromegaly present. Cardiovascular: Normal rate, regular rhythm and normal heart sounds. Pulmonary/Chest: Effort normal and breath sounds normal.   Abdominal: Soft.  Bowel sounds are normal. There is no hepat dose.    Anxiety  Comments:  Initially had increased anxiety due to pandemic but she is now on leave of absence. Her anxiety is well-controlled.

## 2020-08-17 ENCOUNTER — PATIENT MESSAGE (OUTPATIENT)
Dept: FAMILY MEDICINE CLINIC | Facility: CLINIC | Age: 60
End: 2020-08-17

## 2020-08-17 LAB — HPV I/H RISK 1 DNA SPEC QL NAA+PROBE: NEGATIVE

## 2020-08-17 NOTE — TELEPHONE ENCOUNTER
From: Kiersten Stevens  To: CHAMP Solano  Sent: 8/17/2020 9:14 AM CDT  Subject: Test Results Question    I’m going to [facility] early tomorrow. Just making sure Glucose and gluten and the usual thyroid, cholesterol etc are OK.  I’ll be f

## 2020-08-20 ENCOUNTER — PATIENT MESSAGE (OUTPATIENT)
Dept: FAMILY MEDICINE CLINIC | Facility: CLINIC | Age: 60
End: 2020-08-20

## 2020-08-20 DIAGNOSIS — E78.00 ELEVATED LDL CHOLESTEROL LEVEL: Primary | ICD-10-CM

## 2020-08-20 LAB
ABSOLUTE BASOPHILS: 68 CELLS/UL (ref 0–200)
ABSOLUTE EOSINOPHILS: 149 CELLS/UL (ref 15–500)
ABSOLUTE LYMPHOCYTES: 1796 CELLS/UL (ref 850–3900)
ABSOLUTE MONOCYTES: 536 CELLS/UL (ref 200–950)
ABSOLUTE NEUTROPHILS: 1953 CELLS/UL (ref 1500–7800)
ALBUMIN/GLOBULIN RATIO: 1.4 (CALC) (ref 1–2.5)
ALBUMIN: 4.2 G/DL (ref 3.6–5.1)
ALKALINE PHOSPHATASE: 55 U/L (ref 37–153)
ALT: 12 U/L (ref 6–29)
AST: 20 U/L (ref 10–35)
BASOPHILS: 1.5 %
BILIRUBIN, TOTAL: 0.4 MG/DL (ref 0.2–1.2)
BUN: 11 MG/DL (ref 7–25)
CALCIUM: 9.7 MG/DL (ref 8.6–10.4)
CARBON DIOXIDE: 31 MMOL/L (ref 20–32)
CHLORIDE: 102 MMOL/L (ref 98–110)
CHOL/HDLC RATIO: 2.6 (CALC)
CHOLESTEROL, TOTAL: 212 MG/DL
CREATININE: 0.87 MG/DL (ref 0.5–0.99)
EGFR IF AFRICN AM: 83 ML/MIN/1.73M2
EGFR IF NONAFRICN AM: 71 ML/MIN/1.73M2
EOSINOPHILS: 3.3 %
GLOBULIN: 3 G/DL (CALC) (ref 1.9–3.7)
GLUCOSE: 88 MG/DL (ref 65–99)
HDL CHOLESTEROL: 81 MG/DL
HEMATOCRIT: 40 % (ref 35–45)
HEMOGLOBIN: 13.7 G/DL (ref 11.7–15.5)
IMMUNOGLOBULIN A: 330 MG/DL (ref 70–320)
LDL-CHOLESTEROL: 112 MG/DL (CALC)
LYMPHOCYTES: 39.9 %
MCH: 31.9 PG (ref 27–33)
MCHC: 34.3 G/DL (ref 32–36)
MCV: 93 FL (ref 80–100)
MONOCYTES: 11.9 %
MPV: 11.4 FL (ref 7.5–12.5)
NEUTROPHILS: 43.4 %
NON-HDL CHOLESTEROL: 131 MG/DL (CALC)
PLATELET COUNT: 228 THOUSAND/UL (ref 140–400)
POTASSIUM: 5 MMOL/L (ref 3.5–5.3)
PROTEIN, TOTAL: 7.2 G/DL (ref 6.1–8.1)
RDW: 12.2 % (ref 11–15)
RED BLOOD CELL COUNT: 4.3 MILLION/UL (ref 3.8–5.1)
SODIUM: 137 MMOL/L (ref 135–146)
TISSUE TRANSGLUTAMINASE$ANTIBODY, IGA: 1 U/ML
TRIGLYCERIDES: 93 MG/DL
TSH: 1.7 MIU/L (ref 0.4–4.5)
WHITE BLOOD CELL COUNT: 4.5 THOUSAND/UL (ref 3.8–10.8)

## 2020-08-20 RX ORDER — ATORVASTATIN CALCIUM 10 MG/1
10 TABLET, FILM COATED ORAL NIGHTLY
Qty: 90 TABLET | Refills: 0 | Status: SHIPPED | OUTPATIENT
Start: 2020-08-20 | End: 2020-11-18

## 2020-08-20 NOTE — TELEPHONE ENCOUNTER
From: Faby Vallejo  To: CHAMP Kerr  Sent: 8/20/2020 10:25 AM CDT  Subject: Test Results Question    Nick Silva,   I printed out my blood test results and have two questions :     * Celiac details: negative ? ?    At bottom of page \"I

## 2020-08-20 NOTE — TELEPHONE ENCOUNTER
Viewed by Jerrie Sever on 8/20/2020  9:57 AM   Written by CHAMP Da Silva on 8/20/2020  7:23 AM   -No anemia   -Fasting glucose normal, kidney and liver function normal, electrolytes normal   -Cholesterol mildly elevated, slight improvem

## 2020-09-22 ENCOUNTER — TELEPHONE (OUTPATIENT)
Dept: FAMILY MEDICINE CLINIC | Facility: CLINIC | Age: 60
End: 2020-09-22

## 2020-09-22 NOTE — TELEPHONE ENCOUNTER
Does she need another round of shingrix or pnemonia vaccines?     Shingrix 4/18/18, 7/13/18    [id number] 02/05/18

## 2020-10-03 ENCOUNTER — IMMUNIZATION (OUTPATIENT)
Dept: FAMILY MEDICINE CLINIC | Facility: CLINIC | Age: 60
End: 2020-10-03
Payer: COMMERCIAL

## 2020-10-03 DIAGNOSIS — Z23 NEED FOR VACCINATION: ICD-10-CM

## 2020-10-03 PROCEDURE — 90686 IIV4 VACC NO PRSV 0.5 ML IM: CPT | Performed by: FAMILY MEDICINE

## 2020-10-03 PROCEDURE — 90471 IMMUNIZATION ADMIN: CPT | Performed by: FAMILY MEDICINE

## 2020-10-16 ENCOUNTER — MED REC SCAN ONLY (OUTPATIENT)
Dept: FAMILY MEDICINE CLINIC | Facility: CLINIC | Age: 60
End: 2020-10-16

## 2021-01-22 ENCOUNTER — OFFICE VISIT (OUTPATIENT)
Dept: FAMILY MEDICINE CLINIC | Facility: CLINIC | Age: 61
End: 2021-01-22
Payer: COMMERCIAL

## 2021-01-22 VITALS
SYSTOLIC BLOOD PRESSURE: 110 MMHG | BODY MASS INDEX: 21.53 KG/M2 | DIASTOLIC BLOOD PRESSURE: 70 MMHG | WEIGHT: 117 LBS | HEART RATE: 72 BPM | HEIGHT: 62 IN | OXYGEN SATURATION: 97 % | RESPIRATION RATE: 12 BRPM | TEMPERATURE: 98 F

## 2021-01-22 DIAGNOSIS — H69.83 DYSFUNCTION OF BOTH EUSTACHIAN TUBES: Primary | ICD-10-CM

## 2021-01-22 PROCEDURE — 99213 OFFICE O/P EST LOW 20 MIN: CPT | Performed by: FAMILY MEDICINE

## 2021-01-22 PROCEDURE — 3074F SYST BP LT 130 MM HG: CPT | Performed by: FAMILY MEDICINE

## 2021-01-22 PROCEDURE — 3008F BODY MASS INDEX DOCD: CPT | Performed by: FAMILY MEDICINE

## 2021-01-22 PROCEDURE — 3078F DIAST BP <80 MM HG: CPT | Performed by: FAMILY MEDICINE

## 2021-01-22 NOTE — PROGRESS NOTES
Yassine Hudson is a 59year old female. Patient presents with:  Ear Problem: pt feels ear are clogged. . room 3      HPI:   Ears feel plugged. No pain. No prior history of wax buildup.     •  BUPROPION HCL ER, SR, 150 MG Oral Tablet 12 Hr, TAKE 1 51.3 kg (113 lb 1.1 oz)    GENERAL: well developed, well nourished,in no apparent distress  SKIN: no rashes,no suspicious lesions  HEENT: atraumatic, normocephalic, R TM normal, L TM normal, Pharynx normal  NECK: supple, no cervical adenopathy  LUNGS: alyssa

## 2021-01-25 RX ORDER — ESTRADIOL/NORETHINDRONE ACETATE TRANSDERMAL SYSTEM .05; .14 MG/D; MG/D
PATCH, EXTENDED RELEASE TRANSDERMAL
Qty: 24 PATCH | Refills: 3 | Status: SHIPPED | OUTPATIENT
Start: 2021-01-25

## 2021-01-25 NOTE — TELEPHONE ENCOUNTER
Last refill #24 patch x 3 refills on 9/12/19  Last office visit pertaining to refill on 8/14/2020 - cpx with Lucia  No future appointments.

## 2021-04-16 ENCOUNTER — TELEPHONE (OUTPATIENT)
Dept: FAMILY MEDICINE CLINIC | Facility: CLINIC | Age: 61
End: 2021-04-16

## 2021-04-16 NOTE — TELEPHONE ENCOUNTER
Patient states that she keeps getting reminders to schedule her covid vaccine. She already had her vaccine done at [facility] on 03/12/2021.      [employer]    Will call back and give us the lot# or stop by so we can get a copy of her

## 2021-04-25 DIAGNOSIS — F41.9 ANXIETY: ICD-10-CM

## 2021-04-26 RX ORDER — BUPROPION HYDROCHLORIDE 150 MG/1
TABLET, EXTENDED RELEASE ORAL
Qty: 90 TABLET | Refills: 3 | Status: SHIPPED | OUTPATIENT
Start: 2021-04-26

## 2021-04-26 NOTE — TELEPHONE ENCOUNTER
Last office visit 1/22/21    Last refill 7/15/2020  BUPROPION HCL # 90 x 3 refills       Last Lab 8/18/2020

## 2021-08-16 RX ORDER — NYSTATIN 500000 [USP'U]/1
TABLET, COATED ORAL
Qty: 90 TABLET | Refills: 3 | Status: SHIPPED | OUTPATIENT
Start: 2021-08-16

## 2021-08-16 NOTE — TELEPHONE ENCOUNTER
Last refill x 1 yea stephani 7/15/2020  Last office visit pertaining to refill on 8/14/2020 -  cpx  No future appointments. Patient is due for labs and an annual physical - reminder letter sent.

## 2021-09-22 ENCOUNTER — OFFICE VISIT (OUTPATIENT)
Dept: FAMILY MEDICINE CLINIC | Facility: CLINIC | Age: 61
End: 2021-09-22
Payer: COMMERCIAL

## 2021-09-22 ENCOUNTER — TELEPHONE (OUTPATIENT)
Dept: FAMILY MEDICINE CLINIC | Facility: CLINIC | Age: 61
End: 2021-09-22

## 2021-09-22 VITALS
SYSTOLIC BLOOD PRESSURE: 108 MMHG | TEMPERATURE: 98 F | BODY MASS INDEX: 21.07 KG/M2 | RESPIRATION RATE: 12 BRPM | WEIGHT: 114.5 LBS | DIASTOLIC BLOOD PRESSURE: 68 MMHG | OXYGEN SATURATION: 98 % | HEART RATE: 78 BPM | HEIGHT: 62 IN

## 2021-09-22 DIAGNOSIS — F41.9 ANXIETY: ICD-10-CM

## 2021-09-22 DIAGNOSIS — Z20.822 ENCOUNTER FOR SCREENING LABORATORY TESTING FOR COVID-19 VIRUS: Primary | ICD-10-CM

## 2021-09-22 DIAGNOSIS — Z12.31 ENCOUNTER FOR SCREENING MAMMOGRAM FOR MALIGNANT NEOPLASM OF BREAST: ICD-10-CM

## 2021-09-22 DIAGNOSIS — M85.80 OSTEOPENIA, UNSPECIFIED LOCATION: ICD-10-CM

## 2021-09-22 DIAGNOSIS — E78.00 ELEVATED LDL CHOLESTEROL LEVEL: ICD-10-CM

## 2021-09-22 DIAGNOSIS — N95.1 SYMPTOMATIC MENOPAUSAL OR FEMALE CLIMACTERIC STATES: ICD-10-CM

## 2021-09-22 DIAGNOSIS — Z00.00 GENERAL MEDICAL EXAM: Primary | ICD-10-CM

## 2021-09-22 DIAGNOSIS — Z01.419 ENCOUNTER FOR ANNUAL ROUTINE GYNECOLOGICAL EXAMINATION: ICD-10-CM

## 2021-09-22 PROCEDURE — 88175 CYTOPATH C/V AUTO FLUID REDO: CPT | Performed by: NURSE PRACTITIONER

## 2021-09-22 PROCEDURE — 99396 PREV VISIT EST AGE 40-64: CPT | Performed by: NURSE PRACTITIONER

## 2021-09-22 PROCEDURE — 3008F BODY MASS INDEX DOCD: CPT | Performed by: NURSE PRACTITIONER

## 2021-09-22 PROCEDURE — 87624 HPV HI-RISK TYP POOLED RSLT: CPT | Performed by: NURSE PRACTITIONER

## 2021-09-22 PROCEDURE — 3074F SYST BP LT 130 MM HG: CPT | Performed by: NURSE PRACTITIONER

## 2021-09-22 PROCEDURE — 3078F DIAST BP <80 MM HG: CPT | Performed by: NURSE PRACTITIONER

## 2021-09-22 RX ORDER — AZELASTINE HYDROCHLORIDE, FLUTICASONE PROPIONATE 137; 50 UG/1; UG/1
1 SPRAY, METERED NASAL 2 TIMES DAILY
COMMUNITY
Start: 2021-09-02

## 2021-09-22 NOTE — TELEPHONE ENCOUNTER
COVID antibody testing added. At this time, she does not meet requirements for needing vaccine booster. This could change, so she should continue monitoring the [facility]'S CHARLIE website for any updates.

## 2021-09-22 NOTE — TELEPHONE ENCOUNTER
SHE IS COMING ON Friday FOR ALBS AND IS ASKING IF SHE CAN HAVE THE ANTIBIODY LAB FOR COVID ALSO SO SHE KNOWS IF SHE NEEDS THE BOOSTER OR NOT.   SHE WANTS A CALL BACK TO LET HER KNOW

## 2021-09-22 NOTE — PROGRESS NOTES
HPI: HPI   Patient is here for physical. Denies current complaints. Her previous allergist retired. Now seeing ENT at [facility] in [place]. Will be having allergy testing Friday. Not currently on allergy shots. Trying nasal sprays instead.      has cirrhos Sister       Social History:   Social History    Tobacco Use      Smoking status: Never Smoker      Smokeless tobacco: Never Used    Vaping Use      Vaping Use: Never used    Alcohol use: Never    Drug use: Never    Exercise: very active, goes to [facility] normal.      Mouth/Throat:      Mouth: Mucous membranes are moist.      Pharynx: Oropharynx is clear. Eyes:      Conjunctiva/sclera: Conjunctivae normal.      Pupils: Pupils are equal, round, and reactive to light. Neck:      Thyroid: No thyromegaly. Future    Elevated LDL cholesterol level  -     LIPID PANEL    Anxiety  Comments:  Well-controlled with medication    Osteopenia, unspecified location  Comments:  Last DEXA in 2018  Orders:  -     XR DEXA BONE DENSITOMETRY (CPT=77080);  Future    Symptomati Poor

## 2021-09-22 NOTE — TELEPHONE ENCOUNTER
Pt advised, verbalized understanding. She states she forgot to get her flu shot and will come back for a nurse visit.

## 2021-09-25 ENCOUNTER — PATIENT MESSAGE (OUTPATIENT)
Dept: FAMILY MEDICINE CLINIC | Facility: CLINIC | Age: 61
End: 2021-09-25

## 2021-09-25 LAB
ABSOLUTE BASOPHILS: 70 CELLS/UL (ref 0–200)
ABSOLUTE EOSINOPHILS: 158 CELLS/UL (ref 15–500)
ABSOLUTE LYMPHOCYTES: 1449 CELLS/UL (ref 850–3900)
ABSOLUTE MONOCYTES: 336 CELLS/UL (ref 200–950)
ABSOLUTE NEUTROPHILS: 1488 CELLS/UL (ref 1500–7800)
ALBUMIN/GLOBULIN RATIO: 1.5 (CALC) (ref 1–2.5)
ALBUMIN: 4.3 G/DL (ref 3.6–5.1)
ALKALINE PHOSPHATASE: 61 U/L (ref 37–153)
ALT: 17 U/L (ref 6–29)
AST: 21 U/L (ref 10–35)
BASOPHILS: 2 %
BILIRUBIN, TOTAL: 0.4 MG/DL (ref 0.2–1.2)
BUN: 12 MG/DL (ref 7–25)
CALCIUM: 9.7 MG/DL (ref 8.6–10.4)
CARBON DIOXIDE: 30 MMOL/L (ref 20–32)
CHLORIDE: 103 MMOL/L (ref 98–110)
CHOL/HDLC RATIO: 2.1 (CALC)
CHOLESTEROL, TOTAL: 213 MG/DL
CREATININE: 0.86 MG/DL (ref 0.5–0.99)
EGFR IF AFRICN AM: 83 ML/MIN/1.73M2
EGFR IF NONAFRICN AM: 72 ML/MIN/1.73M2
EOSINOPHILS: 4.5 %
GLOBULIN: 2.8 G/DL (CALC) (ref 1.9–3.7)
GLUCOSE: 92 MG/DL (ref 65–99)
HDL CHOLESTEROL: 103 MG/DL
HEMATOCRIT: 39.1 % (ref 35–45)
HEMOGLOBIN: 13.1 G/DL (ref 11.7–15.5)
LDL-CHOLESTEROL: 95 MG/DL (CALC)
LYMPHOCYTES: 41.4 %
MCH: 31.3 PG (ref 27–33)
MCHC: 33.5 G/DL (ref 32–36)
MCV: 93.3 FL (ref 80–100)
MONOCYTES: 9.6 %
MPV: 10.8 FL (ref 7.5–12.5)
NEUTROPHILS: 42.5 %
NON-HDL CHOLESTEROL: 110 MG/DL (CALC)
PLATELET COUNT: 247 THOUSAND/UL (ref 140–400)
POTASSIUM: 5.2 MMOL/L (ref 3.5–5.3)
PROTEIN, TOTAL: 7.1 G/DL (ref 6.1–8.1)
RDW: 12.1 % (ref 11–15)
RED BLOOD CELL COUNT: 4.19 MILLION/UL (ref 3.8–5.1)
SARS COV 2 AB IGG: NEGATIVE
SODIUM: 140 MMOL/L (ref 135–146)
TRIGLYCERIDES: 65 MG/DL
TSH W/REFLEX TO FT4: 1.8 MIU/L (ref 0.4–4.5)
WHITE BLOOD CELL COUNT: 3.5 THOUSAND/UL (ref 3.8–10.8)

## 2021-09-27 NOTE — TELEPHONE ENCOUNTER
From: Kanwal Expose  To: CHAMP Solano  Sent: 9/25/2021 4:49 PM CDT  Subject: Question regarding CBC WITH DIFFERENTIAL WITH PLATELET    Hello Shira, wondering if cholesterol was part of the blood draw.  Also wondering if there’s a referen

## 2021-09-28 LAB — HPV I/H RISK 1 DNA SPEC QL NAA+PROBE: NEGATIVE

## 2021-09-28 NOTE — TELEPHONE ENCOUNTER
Yes cholesterol was in the panel. Please print results for patient and mail to her. This will have the reference numbers. Thanks!

## 2021-10-01 ENCOUNTER — TELEPHONE (OUTPATIENT)
Dept: FAMILY MEDICINE CLINIC | Facility: CLINIC | Age: 61
End: 2021-10-01

## 2021-10-01 NOTE — TELEPHONE ENCOUNTER
Pt is having her white blood cell blood work done at [facility] in [place]. Is she due for any other blood work?  Call Dagoberto Mcnamara

## 2021-10-04 ENCOUNTER — TELEPHONE (OUTPATIENT)
Dept: FAMILY MEDICINE CLINIC | Facility: CLINIC | Age: 61
End: 2021-10-04

## 2021-10-04 NOTE — TELEPHONE ENCOUNTER
[facility]

## 2021-10-05 ENCOUNTER — TELEPHONE (OUTPATIENT)
Dept: FAMILY MEDICINE CLINIC | Facility: CLINIC | Age: 61
End: 2021-10-05

## 2021-10-05 ENCOUNTER — IMMUNIZATION (OUTPATIENT)
Dept: FAMILY MEDICINE CLINIC | Facility: CLINIC | Age: 61
End: 2021-10-05
Payer: COMMERCIAL

## 2021-10-05 DIAGNOSIS — Z23 NEED FOR VACCINATION: Primary | ICD-10-CM

## 2021-10-05 PROCEDURE — 90471 IMMUNIZATION ADMIN: CPT | Performed by: FAMILY MEDICINE

## 2021-10-05 PROCEDURE — 90686 IIV4 VACC NO PRSV 0.5 ML IM: CPT | Performed by: FAMILY MEDICINE

## 2021-10-05 NOTE — TELEPHONE ENCOUNTER
SHE WANTS TO COME IN THIS AFTERNOON FOR A FLU SHOT.   I EXPLAINED THAT THERE ARE NO MORE NURSE APPTS LEFT FOR TODAY BUT SHE WANTS TO COME IN AND WAIT ANYWAY normal/breath sounds equal/clear to auscultation bilaterally/no rales/no rhonchi/no wheezes normal/breath sounds equal/good air movement/clear to auscultation bilaterally/no rales/no rhonchi/no wheezes

## 2021-10-22 ENCOUNTER — TELEPHONE (OUTPATIENT)
Dept: FAMILY MEDICINE CLINIC | Facility: CLINIC | Age: 61
End: 2021-10-22

## 2021-10-22 NOTE — TELEPHONE ENCOUNTER
Contacted patient regarding mammogram results. Advised that mammogram showed bilateral asymmetries noted. Needs follow up views for breast densities. Gurjit Lozano, CHAMP. Patient verbalizes understanding.   [facility]

## 2021-10-22 NOTE — TELEPHONE ENCOUNTER
Patient advised on her bone density scan. Advised Osteopenia-continue calcium, vitamin D. Patient verbalized understanding.

## 2021-10-27 ENCOUNTER — TELEPHONE (OUTPATIENT)
Dept: FAMILY MEDICINE CLINIC | Facility: CLINIC | Age: 61
End: 2021-10-27

## 2021-10-27 NOTE — TELEPHONE ENCOUNTER
Rowena Escobar is due for a repeat CBC, she is requesting additional labs. Will she be due for anything else? She had labs done on 9/24/21.

## 2021-10-30 ENCOUNTER — PATIENT MESSAGE (OUTPATIENT)
Dept: FAMILY MEDICINE CLINIC | Facility: CLINIC | Age: 61
End: 2021-10-30

## 2021-11-01 NOTE — TELEPHONE ENCOUNTER
From: Faby Vallejo  To: CHAMP Kerr  Sent: 10/30/2021 7:11 AM CDT  Subject: Question regarding CBC WITH DIFFERENTIAL WITH PLATELET    Hello Shira, nice to see the WBC up in the normal range.  My follow up Mamogram appointment is on Nov

## 2021-11-05 ENCOUNTER — PATIENT MESSAGE (OUTPATIENT)
Dept: FAMILY MEDICINE CLINIC | Facility: CLINIC | Age: 61
End: 2021-11-05

## 2021-11-05 NOTE — TELEPHONE ENCOUNTER
From: Jules Kruse  To: Dario Hensley DO  Sent: 11/5/2021 7:48 AM CDT  Subject: Maciel Ponce been noticing water retention, stiffness in my knees.  Do I have to go to specialist or can you advise and/or

## 2021-11-08 ENCOUNTER — TELEPHONE (OUTPATIENT)
Dept: FAMILY MEDICINE CLINIC | Facility: CLINIC | Age: 61
End: 2021-11-08

## 2021-11-08 DIAGNOSIS — N60.09 COMPLEX CYST OF BREAST: Primary | ICD-10-CM

## 2021-11-08 NOTE — TELEPHONE ENCOUNTER
Breast Ultrasound done on 11/5/21 at [facility] AT [facility] was reviewed by Jaqui Avondale. Small complex cyst noted, repeat ultrasound in 3 months to monitor. Floridalma Stacy, she verbalized understanding.

## 2021-11-20 ENCOUNTER — OFFICE VISIT (OUTPATIENT)
Dept: FAMILY MEDICINE CLINIC | Facility: CLINIC | Age: 61
End: 2021-11-20
Payer: COMMERCIAL

## 2021-11-20 VITALS
BODY MASS INDEX: 21.02 KG/M2 | OXYGEN SATURATION: 94 % | DIASTOLIC BLOOD PRESSURE: 68 MMHG | HEART RATE: 81 BPM | SYSTOLIC BLOOD PRESSURE: 110 MMHG | HEIGHT: 62.75 IN | TEMPERATURE: 99 F | RESPIRATION RATE: 12 BRPM | WEIGHT: 117.13 LBS

## 2021-11-20 DIAGNOSIS — M25.562 CHRONIC PAIN OF LEFT KNEE: Primary | ICD-10-CM

## 2021-11-20 DIAGNOSIS — G89.29 CHRONIC PAIN OF LEFT KNEE: Primary | ICD-10-CM

## 2021-11-20 PROCEDURE — 3074F SYST BP LT 130 MM HG: CPT | Performed by: FAMILY MEDICINE

## 2021-11-20 PROCEDURE — 99213 OFFICE O/P EST LOW 20 MIN: CPT | Performed by: FAMILY MEDICINE

## 2021-11-20 PROCEDURE — 3078F DIAST BP <80 MM HG: CPT | Performed by: FAMILY MEDICINE

## 2021-11-20 PROCEDURE — 3008F BODY MASS INDEX DOCD: CPT | Performed by: FAMILY MEDICINE

## 2021-11-20 NOTE — PROGRESS NOTES
Mo Blas is a 59year old female. Patient presents with:  Knee Pain: bilateral inrm. 1      HPI:   Patient complains of bilateral knee pain left worse than right. She had a cartilage repair at the age of 14 on her left knee.   The knee occa pain   GI: denies nausea, vomiting, diarrhea or abdominal pain   NEURO: denies headaches    EXAM:   /68 (BP Location: Right arm, Patient Position: Sitting, Cuff Size: adult)   Pulse 81   Temp 99.1 °F (37.3 °C) (Temporal)   Resp 12   Ht 5' 2.75\" (1.5

## 2021-12-18 ENCOUNTER — PATIENT MESSAGE (OUTPATIENT)
Dept: FAMILY MEDICINE CLINIC | Facility: CLINIC | Age: 61
End: 2021-12-18

## 2021-12-18 DIAGNOSIS — N60.09 COMPLEX CYST OF BREAST: Primary | ICD-10-CM

## 2021-12-18 NOTE — TELEPHONE ENCOUNTER
Should we order a bilateral US based on results? Please sign order.        \"Previous noted density within the retroareolar aspect of the right breast   dissipates on the compression spot view imaging obtained and is not   identified on ML imaging.  [employer]

## 2022-01-25 ENCOUNTER — TELEPHONE (OUTPATIENT)
Dept: FAMILY MEDICINE CLINIC | Facility: CLINIC | Age: 62
End: 2022-01-25

## 2022-01-25 DIAGNOSIS — Z79.899 HIGH RISK MEDICATION USE: Primary | ICD-10-CM

## 2022-01-25 RX ORDER — DICLOFENAC SODIUM 75 MG/1
75 TABLET, DELAYED RELEASE ORAL DAILY
Qty: 1 TABLET | Refills: 0 | COMMUNITY
Start: 2022-01-25

## 2022-01-25 NOTE — TELEPHONE ENCOUNTER
Couple Things about the diclofenac. It is a very strong anti-inflammatory and very good but it can be hard on the kidneys and hard on the stomach. She should try to see if she can get by with 1 a day taken in the morning instead of twice a day.   Many peo

## 2022-01-25 NOTE — TELEPHONE ENCOUNTER
Yuridia  states she went to see Dr. Miranda Benz for her knee pain. She states she was prescribed diclofenac and it is working well for her.     She states Dr. Miranda Benz told her Dr. Socorro Boothe will need to manage her refills going forward because she may need her kid

## 2022-01-25 NOTE — TELEPHONE ENCOUNTER
Pt advised. She verbalized understanding. She agrees to cut down to diclofenac daily. Med list updated. Lab work order faxed to [facility] in [place] per pt's request.  Phone [phone], Fax [phone].

## 2022-01-27 ENCOUNTER — TELEPHONE (OUTPATIENT)
Dept: FAMILY MEDICINE CLINIC | Facility: CLINIC | Age: 62
End: 2022-01-27

## 2022-01-27 NOTE — TELEPHONE ENCOUNTER
Pt would like lab orders faxed to [facility] in [place]. She didn't have fax number. She has an appt tomorrow morning.

## 2022-01-29 LAB
BUN: 12 MG/DL (ref 7–25)
CALCIUM: 9.8 MG/DL (ref 8.6–10.4)
CARBON DIOXIDE: 31 MMOL/L (ref 20–32)
CHLORIDE: 104 MMOL/L (ref 98–110)
CREATININE: 0.91 MG/DL (ref 0.5–0.99)
EGFR IF AFRICN AM: 77 ML/MIN/1.73M2
EGFR IF NONAFRICN AM: 67 ML/MIN/1.73M2
GLUCOSE: 56 MG/DL (ref 65–99)
POTASSIUM: 5.2 MMOL/L (ref 3.5–5.3)
SODIUM: 139 MMOL/L (ref 135–146)

## 2022-02-08 ENCOUNTER — TELEPHONE (OUTPATIENT)
Dept: FAMILY MEDICINE CLINIC | Facility: CLINIC | Age: 62
End: 2022-02-08

## 2022-02-08 NOTE — TELEPHONE ENCOUNTER
Order faxed to [facility]. Pt aware. She will call to make appt.

## 2022-02-08 NOTE — TELEPHONE ENCOUNTER
Bilateral breast U/S completed on 2/7/22 at [facility]. Results reviewed by Eladio Rosales    Complex cyst increased in size and complexity. Needs U/S guided bx. Repeat bilateral breast US in 3 months to monitor other cysts.

## 2022-02-08 NOTE — TELEPHONE ENCOUNTER
Please sign pended order for follow-up ultrasound. Spoke to Suzan Knight, she states she already has an appt on 2/22 for a breast biopsy. She will call in a few days to schedule the bilateral follow-up ultrasound.

## 2022-02-24 ENCOUNTER — TELEPHONE (OUTPATIENT)
Dept: FAMILY MEDICINE CLINIC | Facility: CLINIC | Age: 62
End: 2022-02-24

## 2022-02-24 NOTE — TELEPHONE ENCOUNTER
Yes she should repeat the breast ultrasound in 3 months. They biopsied one complex cystic lesion, which was benign. There are other areas of mildly complex cysts that they want to follow. Next breast us due in May. Order placed      She will need a 6 month up follow up bilateral diagnostic mammogram in May. This was recommended on last mammogram done 11/5/21. External order placed today. Should be able to complete both bilateral diagnostic aníbal and breast us at same time since due at same time.

## 2022-02-24 NOTE — TELEPHONE ENCOUNTER
Discussed recommendations from Ale Costello 894. Verbalized understanding. Advised orders will be faxed today. She will call to make appointments. Also sent mcm per pt's request. She would like Shira's response for reference.

## 2022-02-24 NOTE — TELEPHONE ENCOUNTER
Pathology results from 2/22/22 regarding left breast.    Advised Darnell Almanzan that her breast biopsy is negative for malignancy. She wants to know if she should still get a bilateral breast ultrasound. Also wants to know if her next mammogram screening will be due October 2022 or one year from her biopsy. Please advise.

## 2022-03-08 RX ORDER — BUPROPION HYDROCHLORIDE 150 MG/1
TABLET, EXTENDED RELEASE ORAL
Qty: 90 TABLET | Refills: 3 | Status: SHIPPED | OUTPATIENT
Start: 2022-03-08

## 2022-04-29 ENCOUNTER — TELEPHONE (OUTPATIENT)
Dept: FAMILY MEDICINE CLINIC | Facility: CLINIC | Age: 62
End: 2022-04-29

## 2022-04-29 NOTE — TELEPHONE ENCOUNTER
SHE IS AT [facility] IN [facility] RIGHT NOW AND THEY NEED THE ORDER FOR HER LABS   FAX [phone]

## 2022-04-29 NOTE — TELEPHONE ENCOUNTER
Per Dr. Ashley Waller note in January, he recommended checking BMP every 3 months with taking Diclofenac. Orders placed for today, 3 months and 6 months. Rest of labs due in Sept. Will fax now.

## 2022-04-30 LAB
BUN: 10 MG/DL (ref 7–25)
CALCIUM: 9.2 MG/DL (ref 8.6–10.4)
CARBON DIOXIDE: 30 MMOL/L (ref 20–32)
CHLORIDE: 103 MMOL/L (ref 98–110)
CREATININE: 0.82 MG/DL (ref 0.5–0.99)
EGFR IF AFRICN AM: 88 ML/MIN/1.73M2
EGFR IF NONAFRICN AM: 76 ML/MIN/1.73M2
GLUCOSE: 79 MG/DL (ref 65–139)
POTASSIUM: 4.2 MMOL/L (ref 3.5–5.3)
SODIUM: 139 MMOL/L (ref 135–146)

## 2022-05-23 ENCOUNTER — PATIENT MESSAGE (OUTPATIENT)
Dept: FAMILY MEDICINE CLINIC | Facility: CLINIC | Age: 62
End: 2022-05-23

## 2022-05-24 ENCOUNTER — PATIENT MESSAGE (OUTPATIENT)
Dept: FAMILY MEDICINE CLINIC | Facility: CLINIC | Age: 62
End: 2022-05-24

## 2022-05-24 NOTE — TELEPHONE ENCOUNTER
Okay to order MRI of the breast as recommended by the radiologist because of her hyperdense breast tissue and questionable mass  Should be done at [facility] or [facility] so it stays in the same system her mammograms and ultrasound have been done

## 2022-05-24 NOTE — TELEPHONE ENCOUNTER
Called central scheduling dept.  Confirmed that breast MRI can be done at [facility].    Order faxed to [phone] per request.

## 2022-05-24 NOTE — TELEPHONE ENCOUNTER
Per Meghan Sprague from MRI dept, order bilateral breast MRI with and without contrast.     Test it not offered at [facility].  May try [facility] Her[facility], [facility] or [facility].

## 2022-05-26 ENCOUNTER — TELEPHONE (OUTPATIENT)
Dept: FAMILY MEDICINE CLINIC | Facility: CLINIC | Age: 62
End: 2022-05-26

## 2022-05-26 NOTE — TELEPHONE ENCOUNTER
Kiah from [facility] Informed choice called. MRI has been approved. Pt is having MRI on June 9th. Please fax MRI breast with & without to [address] [place] [address].   fax:  [phone]

## 2022-06-13 ENCOUNTER — TELEPHONE (OUTPATIENT)
Dept: FAMILY MEDICINE CLINIC | Facility: CLINIC | Age: 62
End: 2022-06-13

## 2022-06-13 DIAGNOSIS — R92.8 ABNORMAL MRI, BREAST: Primary | ICD-10-CM

## 2022-06-13 NOTE — TELEPHONE ENCOUNTER
Per Gray Osborne from the radiology clinic, previous ultrasounds and mammograms results were reviewed. There is an area on the left breast that is concerning and was not noted on the previous images that they would like to revisit.

## 2022-06-13 NOTE — TELEPHONE ENCOUNTER
Pt completed a breast MRI at an external facility ([facility]) d/t cost.    Dr. Radha Sparks reviewed results: \"MRI of left breast shows probable cyst in left breast. Radiologist recommending second look ultrasound, 4:00 posterior zone left breast.\"    Spoke to pt, she states she had two ultrasounds and a biopsy done at [facility] before completing the MRI. What will be the next step? Please advise.

## 2022-06-13 NOTE — TELEPHONE ENCOUNTER
I do not know if [facility] can forward the ultrasound report to the facility that did the MRI scan so that  radiologist can compare what he sees on the MRI to the previous ultrasound.

## 2022-06-15 ENCOUNTER — OFFICE VISIT (OUTPATIENT)
Dept: FAMILY MEDICINE CLINIC | Facility: CLINIC | Age: 62
End: 2022-06-15
Payer: COMMERCIAL

## 2022-06-15 VITALS
SYSTOLIC BLOOD PRESSURE: 136 MMHG | WEIGHT: 117 LBS | HEART RATE: 74 BPM | DIASTOLIC BLOOD PRESSURE: 74 MMHG | RESPIRATION RATE: 14 BRPM | OXYGEN SATURATION: 96 % | BODY MASS INDEX: 21 KG/M2 | TEMPERATURE: 99 F

## 2022-06-15 DIAGNOSIS — J01.90 ACUTE RHINOSINUSITIS: Primary | ICD-10-CM

## 2022-06-15 PROCEDURE — 3078F DIAST BP <80 MM HG: CPT | Performed by: FAMILY MEDICINE

## 2022-06-15 PROCEDURE — 3075F SYST BP GE 130 - 139MM HG: CPT | Performed by: FAMILY MEDICINE

## 2022-06-15 PROCEDURE — 99213 OFFICE O/P EST LOW 20 MIN: CPT | Performed by: FAMILY MEDICINE

## 2022-06-15 RX ORDER — AZITHROMYCIN 250 MG/1
TABLET, FILM COATED ORAL
Qty: 6 TABLET | Refills: 0 | Status: SHIPPED | OUTPATIENT
Start: 2022-06-15 | End: 2022-06-20

## 2022-06-20 ENCOUNTER — MED REC SCAN ONLY (OUTPATIENT)
Dept: FAMILY MEDICINE CLINIC | Facility: CLINIC | Age: 62
End: 2022-06-20

## 2022-08-02 ENCOUNTER — TELEPHONE (OUTPATIENT)
Dept: FAMILY MEDICINE CLINIC | Facility: CLINIC | Age: 62
End: 2022-08-02

## 2022-08-02 NOTE — TELEPHONE ENCOUNTER
WILL BE GETTING LABS DONE @ [facility] EARLY THURSDAY MORNING, CHAMP IVEY ORDERED LABS, PT WANTS TO MAKE SURE LABS THAT WERE ORDERED WILL NOT ONLY TEST KIDNEYS/ ARTHRITIS, BUT ALSO FOCUS ON CHECKING FOR CANCER FOR BREAST CYST, CALL PT

## 2022-08-03 NOTE — TELEPHONE ENCOUNTER
agree   There are no cancer tests  And her prior labs earlier this year were fairly comprehensive so the current labs are appropriate  Nothing to add

## 2022-08-03 NOTE — TELEPHONE ENCOUNTER
Called and spoke with pt, having labs in the am, wondering if there is any additional blood work to screen for breast cancer with her hx of cyst in breast. Notified there is no screening blood work for breast cancer but will route to Dr. Penny Mccromack to see if there is any additional blood work he would like.

## 2022-08-05 ENCOUNTER — PATIENT MESSAGE (OUTPATIENT)
Dept: FAMILY MEDICINE CLINIC | Facility: CLINIC | Age: 62
End: 2022-08-05

## 2022-08-05 LAB
BUN: 12 MG/DL (ref 7–25)
CALCIUM: 9.4 MG/DL (ref 8.6–10.4)
CARBON DIOXIDE: 32 MMOL/L (ref 20–32)
CHLORIDE: 105 MMOL/L (ref 98–110)
CREATININE: 0.88 MG/DL (ref 0.5–1.05)
EGFR: 73 ML/MIN/1.73M2
GLUCOSE: 83 MG/DL (ref 65–99)
POTASSIUM: 4.7 MMOL/L (ref 3.5–5.3)
SODIUM: 139 MMOL/L (ref 135–146)

## 2022-09-14 ENCOUNTER — TELEPHONE (OUTPATIENT)
Dept: FAMILY MEDICINE CLINIC | Facility: CLINIC | Age: 62
End: 2022-09-14

## 2022-09-14 RX ORDER — ESTRADIOL/NORETHINDRONE ACETATE TRANSDERMAL SYSTEM .05; .14 MG/D; MG/D
1 PATCH, EXTENDED RELEASE TRANSDERMAL
Qty: 24 PATCH | Refills: 3 | Status: SHIPPED | OUTPATIENT
Start: 2022-09-14

## 2022-09-14 NOTE — TELEPHONE ENCOUNTER
Gynecology Medication Protocol Failed 09/10/2022 11:38 AM    Mammogram in past 12 months    PASS--PENDING LAST PAP WNL--VIA MANUAL LOOKUP    Physical or Pelvic/Breast in past 12 or next 3 mos--VIA MANUAL LOOKUP

## 2022-09-15 NOTE — TELEPHONE ENCOUNTER
Please review ultrasound report from 6/30/22. It states follow up 6 months for an ultrasound. of left breast. Please review Dr. Elizabeth Fraction note on that ultrasound. Are you agreeable?

## 2022-09-15 NOTE — TELEPHONE ENCOUNTER
Spoke with patient and advised she does not need an ultrasound, but and MRI in December. She is not sure if she will go to the same place or not, therefore no order placed. Advised to call back beginning of December and we will place the order at that time and get it authorized by insurance. She verbalized understanding.

## 2022-09-15 NOTE — TELEPHONE ENCOUNTER
Agree, but this needs to be an MRI of the left breast which is the problem area plus needs to be an MRI.     Also, she did  have it at a certain radiology center and may need it faxed there

## 2022-10-03 ENCOUNTER — OFFICE VISIT (OUTPATIENT)
Dept: FAMILY MEDICINE CLINIC | Facility: CLINIC | Age: 62
End: 2022-10-03
Payer: COMMERCIAL

## 2022-10-03 VITALS
SYSTOLIC BLOOD PRESSURE: 114 MMHG | HEART RATE: 87 BPM | WEIGHT: 114.25 LBS | DIASTOLIC BLOOD PRESSURE: 76 MMHG | HEIGHT: 62.5 IN | OXYGEN SATURATION: 98 % | TEMPERATURE: 98 F | BODY MASS INDEX: 20.5 KG/M2 | RESPIRATION RATE: 12 BRPM

## 2022-10-03 DIAGNOSIS — B37.9: ICD-10-CM

## 2022-10-03 DIAGNOSIS — J45.21 MILD INTERMITTENT REACTIVE AIRWAY DISEASE WITH ACUTE EXACERBATION: ICD-10-CM

## 2022-10-03 DIAGNOSIS — G89.29 CHRONIC PAIN OF LEFT KNEE: ICD-10-CM

## 2022-10-03 DIAGNOSIS — Z23 NEED FOR VACCINATION FOR PNEUMOCOCCUS: ICD-10-CM

## 2022-10-03 DIAGNOSIS — J30.1 SEASONAL ALLERGIC RHINITIS DUE TO POLLEN: ICD-10-CM

## 2022-10-03 DIAGNOSIS — Z79.1 NSAID LONG-TERM USE: ICD-10-CM

## 2022-10-03 DIAGNOSIS — M25.562 CHRONIC PAIN OF LEFT KNEE: ICD-10-CM

## 2022-10-03 DIAGNOSIS — Z23 NEED FOR VACCINATION: ICD-10-CM

## 2022-10-03 DIAGNOSIS — Z00.00 WELL ADULT EXAM: Primary | ICD-10-CM

## 2022-10-03 PROCEDURE — 3008F BODY MASS INDEX DOCD: CPT | Performed by: FAMILY MEDICINE

## 2022-10-03 PROCEDURE — 3074F SYST BP LT 130 MM HG: CPT | Performed by: FAMILY MEDICINE

## 2022-10-03 PROCEDURE — 99396 PREV VISIT EST AGE 40-64: CPT | Performed by: FAMILY MEDICINE

## 2022-10-03 PROCEDURE — 3078F DIAST BP <80 MM HG: CPT | Performed by: FAMILY MEDICINE

## 2022-10-03 PROCEDURE — 90686 IIV4 VACC NO PRSV 0.5 ML IM: CPT | Performed by: FAMILY MEDICINE

## 2022-10-03 PROCEDURE — 90471 IMMUNIZATION ADMIN: CPT | Performed by: FAMILY MEDICINE

## 2022-10-03 RX ORDER — NYSTATIN 500000 [USP'U]/1
1 TABLET, COATED ORAL DAILY
Qty: 90 TABLET | Refills: 3 | Status: SHIPPED | OUTPATIENT
Start: 2022-10-03

## 2022-10-03 RX ORDER — DICLOFENAC SODIUM 75 MG/1
75 TABLET, DELAYED RELEASE ORAL DAILY
Qty: 90 TABLET | Refills: 0 | COMMUNITY
Start: 2022-10-03

## 2022-10-11 RX ORDER — DICLOFENAC SODIUM 75 MG/1
75 TABLET, DELAYED RELEASE ORAL DAILY
Qty: 90 TABLET | Refills: 0 | Status: SHIPPED | OUTPATIENT
Start: 2022-10-11

## 2022-10-11 NOTE — TELEPHONE ENCOUNTER
ZDX:30-6-00  LAST LAB:8-4-22  LAST RX:     Disp Refills Start End    diclofenac 75 MG Oral Tab EC 90 tablet 0 10/3/2022     Sig - Route: Take 1 tablet (75 mg total) by mouth daily. - Oral    Next OV:No future appointments.    Lady Brown

## 2022-11-01 LAB
BUN: 17 MG/DL (ref 7–25)
CALCIUM: 9.7 MG/DL (ref 8.6–10.4)
CARBON DIOXIDE: 31 MMOL/L (ref 20–32)
CHLORIDE: 102 MMOL/L (ref 98–110)
CREATININE: 0.85 MG/DL (ref 0.5–1.05)
EGFR: 76 ML/MIN/1.73M2
GLUCOSE: 86 MG/DL (ref 65–139)
POTASSIUM: 4.8 MMOL/L (ref 3.5–5.3)
SODIUM: 138 MMOL/L (ref 135–146)

## 2022-11-02 ENCOUNTER — PATIENT MESSAGE (OUTPATIENT)
Dept: FAMILY MEDICINE CLINIC | Facility: CLINIC | Age: 62
End: 2022-11-02

## 2022-11-06 DIAGNOSIS — F41.9 ANXIETY: ICD-10-CM

## 2022-11-07 RX ORDER — BUPROPION HYDROCHLORIDE 150 MG/1
TABLET, EXTENDED RELEASE ORAL
Qty: 90 TABLET | Refills: 1 | Status: SHIPPED | OUTPATIENT
Start: 2022-11-07

## 2022-11-28 ENCOUNTER — TELEPHONE (OUTPATIENT)
Dept: FAMILY MEDICINE CLINIC | Facility: CLINIC | Age: 62
End: 2022-11-28

## 2022-11-28 DIAGNOSIS — J32.9 SINOBRONCHITIS: Primary | ICD-10-CM

## 2022-11-28 DIAGNOSIS — J40 SINOBRONCHITIS: Primary | ICD-10-CM

## 2022-11-28 RX ORDER — AZITHROMYCIN 250 MG/1
TABLET, FILM COATED ORAL
Qty: 6 TABLET | Refills: 0 | Status: SHIPPED | OUTPATIENT
Start: 2022-11-28 | End: 2022-12-03

## 2022-11-28 NOTE — TELEPHONE ENCOUNTER
Patient has a headachem sore throat, congested cough, and fever 100.3 x 3 days. She did cough up some discolored phlegm. Would like a Zpack called to imagine.

## 2022-11-28 NOTE — TELEPHONE ENCOUNTER
Sent zpack to the pharmacy  And  See someone if this does not resolve    With fever  This actually could be influenza a which is very prevalent currently

## 2022-12-21 ENCOUNTER — TELEPHONE (OUTPATIENT)
Dept: FAMILY MEDICINE CLINIC | Facility: CLINIC | Age: 62
End: 2022-12-21

## 2022-12-21 NOTE — TELEPHONE ENCOUNTER
Patient advised. States she will call back and let us know where she is going for this. Had her last one at [address] [place] [address].

## 2022-12-21 NOTE — TELEPHONE ENCOUNTER
Patient advised to recheck mammogram in one year.  - ellie.o. Dr. Paola Richards  Patient wants to know if needs to do MRI of breast which was ordered previously.

## 2022-12-27 ENCOUNTER — TELEPHONE (OUTPATIENT)
Dept: FAMILY MEDICINE CLINIC | Facility: CLINIC | Age: 62
End: 2022-12-27

## 2022-12-27 DIAGNOSIS — N60.09 COMPLEX CYST OF BREAST: Primary | ICD-10-CM

## 2022-12-27 NOTE — TELEPHONE ENCOUNTER
PT WOULD LIKE TO HAVE MRI DONE AT [facility]- COULD ORDER BE SENT THERE-  [address] [phone]    PLEASE ADVISE-THANK YOU

## 2023-01-04 ENCOUNTER — TELEPHONE (OUTPATIENT)
Dept: FAMILY MEDICINE CLINIC | Facility: CLINIC | Age: 63
End: 2023-01-04

## 2023-01-04 DIAGNOSIS — Z80.0 FAMILY HISTORY OF COLON CANCER: ICD-10-CM

## 2023-01-04 DIAGNOSIS — Z12.11 COLON CANCER SCREENING: Primary | ICD-10-CM

## 2023-01-04 NOTE — TELEPHONE ENCOUNTER
Spoke with pt -   Asking when last colonoscopy was done and can she have an order for another one - mother recently  of colon cancer    Last colonoscopy 3/8/2010 by Dr. Kaylah Sen  Had negative cologuard 3/2020    If needed would like a referral to someone at [facility] and [facility].    Please advise

## 2023-01-04 NOTE — TELEPHONE ENCOUNTER
Pt notified of referral to Dr. Willard Slater. Pt will call and make appointment. Also states needs order for MRI to be sent to somewhere different. [place] does not due breast MRIs.   Will f/u with pt on where previous MRI was done

## 2023-01-04 NOTE — TELEPHONE ENCOUNTER
Pt. Asking if she is due for a colonoscopy and if she is due she is asking if those orders can be faxed to [facility] and [facility].

## 2023-01-10 ENCOUNTER — MED REC SCAN ONLY (OUTPATIENT)
Dept: FAMILY MEDICINE CLINIC | Facility: CLINIC | Age: 63
End: 2023-01-10

## 2023-01-18 ENCOUNTER — TELEPHONE (OUTPATIENT)
Dept: FAMILY MEDICINE CLINIC | Facility: CLINIC | Age: 63
End: 2023-01-18

## 2023-01-18 NOTE — TELEPHONE ENCOUNTER
Received call from [facility] radiology - needs last office visit notes.   Office notes - 10/03/22  Faxed to [phone]

## 2023-01-19 NOTE — TELEPHONE ENCOUNTER
Received a fax stating the the MRI is approved. Detailed message left with patient regarding this. Instructed to call back if any questions or concerns.

## 2023-01-24 ENCOUNTER — TELEPHONE (OUTPATIENT)
Dept: FAMILY MEDICINE CLINIC | Facility: CLINIC | Age: 63
End: 2023-01-24

## 2023-01-24 DIAGNOSIS — Z79.1 NSAID LONG-TERM USE: Primary | ICD-10-CM

## 2023-01-24 NOTE — TELEPHONE ENCOUNTER
Spoke with patient - last bmp was 11/2/2023 - 3 months would be 2/1/23    Also asked pt about MRI - MRI is postponed until mid-March. Pt had colonoscopy and needs to wait a couple months until able to do MRI.   Will have MRI done at [address] [place] [address]

## 2023-01-24 NOTE — TELEPHONE ENCOUNTER
PT. IS ASKING IF SHE IS DUE FOR HER LABS THAT CHECK HER KIDNEYS. SHE CANNOT REMEMBER WHEN SHE DID THEM LAST. SHE GETS IT CHECKED EVERY 3 MO AT [facility]. Face Mask/EKG

## 2023-02-07 ENCOUNTER — PATIENT MESSAGE (OUTPATIENT)
Dept: FAMILY MEDICINE CLINIC | Facility: CLINIC | Age: 63
End: 2023-02-07

## 2023-02-07 DIAGNOSIS — Z01.818 PREOP TESTING: Primary | ICD-10-CM

## 2023-02-07 NOTE — TELEPHONE ENCOUNTER
From: Carmencita Reynolds  To: Yessica Vasquez MD  Sent: 2/7/2023 11:28 AM CST  Subject: Blood draw for pre op    I have an appointment at [facility] on Feb 17th for the arthritis medication. My appointment for pre op with you is on Feb 24th. Just wondering if any blood work surgeon orders also be done on the 17th?  Thank you

## 2023-02-18 LAB
ABSOLUTE BASOPHILS: 52 CELLS/UL (ref 0–200)
ABSOLUTE EOSINOPHILS: 170 CELLS/UL (ref 15–500)
ABSOLUTE LYMPHOCYTES: 1639 CELLS/UL (ref 850–3900)
ABSOLUTE MONOCYTES: 411 CELLS/UL (ref 200–950)
ABSOLUTE NEUTROPHILS: 1428 CELLS/UL (ref 1500–7800)
ALBUMIN/GLOBULIN RATIO: 1.7 (CALC) (ref 1–2.5)
ALBUMIN: 4.4 G/DL (ref 3.6–5.1)
ALKALINE PHOSPHATASE: 66 U/L (ref 37–153)
ALT: 32 U/L (ref 6–29)
AST: 31 U/L (ref 10–35)
BASOPHILS: 1.4 %
BILIRUBIN, TOTAL: 0.4 MG/DL (ref 0.2–1.2)
BUN: 18 MG/DL (ref 7–25)
CALCIUM: 9.5 MG/DL (ref 8.6–10.4)
CARBON DIOXIDE: 31 MMOL/L (ref 20–32)
CHLORIDE: 101 MMOL/L (ref 98–110)
CREATININE: 0.75 MG/DL (ref 0.5–1.05)
EGFR: 88 ML/MIN/1.73M2
EOSINOPHILS: 4.6 %
GLOBULIN: 2.6 G/DL (CALC) (ref 1.9–3.7)
GLUCOSE: 77 MG/DL (ref 65–99)
HEMATOCRIT: 39.5 % (ref 35–45)
HEMOGLOBIN: 13.3 G/DL (ref 11.7–15.5)
INR: 1
LYMPHOCYTES: 44.3 %
MCH: 31.1 PG (ref 27–33)
MCHC: 33.7 G/DL (ref 32–36)
MCV: 92.5 FL (ref 80–100)
MONOCYTES: 11.1 %
MPV: 10.9 FL (ref 7.5–12.5)
NEUTROPHILS: 38.6 %
PARTIAL THROMBOPLASTIN$TIME, ACTIVATED: 31 SEC (ref 23–32)
PLATELET COUNT: 252 THOUSAND/UL (ref 140–400)
POTASSIUM: 4.4 MMOL/L (ref 3.5–5.3)
PROTEIN, TOTAL: 7 G/DL (ref 6.1–8.1)
PT: 9.8 SEC (ref 9–11.5)
RDW: 13 % (ref 11–15)
RED BLOOD CELL COUNT: 4.27 MILLION/UL (ref 3.8–5.1)
SODIUM: 140 MMOL/L (ref 135–146)
WHITE BLOOD CELL COUNT: 3.7 THOUSAND/UL (ref 3.8–10.8)

## 2023-02-20 RX ORDER — SCOLOPAMINE TRANSDERMAL SYSTEM 1 MG/1
1 PATCH, EXTENDED RELEASE TRANSDERMAL ONCE
OUTPATIENT
Start: 2023-02-20 | End: 2023-02-20

## 2023-02-24 ENCOUNTER — OFFICE VISIT (OUTPATIENT)
Dept: FAMILY MEDICINE CLINIC | Facility: CLINIC | Age: 63
End: 2023-02-24
Payer: COMMERCIAL

## 2023-02-24 VITALS
DIASTOLIC BLOOD PRESSURE: 70 MMHG | OXYGEN SATURATION: 99 % | BODY MASS INDEX: 20.28 KG/M2 | HEIGHT: 62.5 IN | TEMPERATURE: 98 F | WEIGHT: 113 LBS | HEART RATE: 62 BPM | SYSTOLIC BLOOD PRESSURE: 124 MMHG

## 2023-02-24 DIAGNOSIS — Z01.812 PRE-OPERATIVE LABORATORY EXAMINATION: ICD-10-CM

## 2023-02-24 DIAGNOSIS — Z01.818 PRE-PROCEDURAL EXAMINATION: Primary | ICD-10-CM

## 2023-02-24 DIAGNOSIS — Z01.810 PREOP CARDIOVASCULAR EXAM: ICD-10-CM

## 2023-02-24 DIAGNOSIS — M17.11 PRIMARY OSTEOARTHRITIS OF RIGHT KNEE: ICD-10-CM

## 2023-02-24 DIAGNOSIS — Z01.818 PRE-OP TESTING: ICD-10-CM

## 2023-02-24 PROCEDURE — 3074F SYST BP LT 130 MM HG: CPT | Performed by: FAMILY MEDICINE

## 2023-02-24 PROCEDURE — 3008F BODY MASS INDEX DOCD: CPT | Performed by: FAMILY MEDICINE

## 2023-02-24 PROCEDURE — 3078F DIAST BP <80 MM HG: CPT | Performed by: FAMILY MEDICINE

## 2023-02-24 PROCEDURE — 99214 OFFICE O/P EST MOD 30 MIN: CPT | Performed by: FAMILY MEDICINE

## 2023-02-24 PROCEDURE — 93000 ELECTROCARDIOGRAM COMPLETE: CPT | Performed by: FAMILY MEDICINE

## 2023-02-27 ENCOUNTER — PATIENT MESSAGE (OUTPATIENT)
Dept: FAMILY MEDICINE CLINIC | Facility: CLINIC | Age: 63
End: 2023-02-27

## 2023-02-27 LAB
ATRIAL RATE: 61 BPM
P AXIS: 7 DEGREES
P-R INTERVAL: 170 MS
Q-T INTERVAL: 418 MS
QRS DURATION: 66 MS
QTC CALCULATION (BEZET): 420 MS
R AXIS: 37 DEGREES
T AXIS: 10 DEGREES
VENTRICULAR RATE: 61 BPM

## 2023-03-01 ENCOUNTER — LABORATORY ENCOUNTER (OUTPATIENT)
Dept: LAB | Facility: HOSPITAL | Age: 63
End: 2023-03-01
Attending: ORTHOPAEDIC SURGERY
Payer: COMMERCIAL

## 2023-03-01 DIAGNOSIS — Z01.818 PRE-OP TESTING: ICD-10-CM

## 2023-03-01 DIAGNOSIS — M17.11 PRIMARY OSTEOARTHRITIS OF RIGHT KNEE: ICD-10-CM

## 2023-03-01 LAB
ALBUMIN SERPL-MCNC: 3.9 G/DL (ref 3.4–5)
ALBUMIN/GLOB SERPL: 1.1 {RATIO} (ref 1–2)
ALP LIVER SERPL-CCNC: 74 U/L
ALT SERPL-CCNC: 37 U/L
ANION GAP SERPL CALC-SCNC: <0 MMOL/L (ref 0–18)
ANTIBODY SCREEN: NEGATIVE
APTT PPP: 29 SECONDS (ref 23.3–35.6)
AST SERPL-CCNC: 29 U/L (ref 15–37)
BASOPHILS # BLD AUTO: 0.07 X10(3) UL (ref 0–0.2)
BASOPHILS NFR BLD AUTO: 1.5 %
BILIRUB SERPL-MCNC: 0.2 MG/DL (ref 0.1–2)
BUN BLD-MCNC: 14 MG/DL (ref 7–18)
CALCIUM BLD-MCNC: 9.3 MG/DL (ref 8.5–10.1)
CHLORIDE SERPL-SCNC: 107 MMOL/L (ref 98–112)
CO2 SERPL-SCNC: 35 MMOL/L (ref 21–32)
CREAT BLD-MCNC: 0.71 MG/DL
EOSINOPHIL # BLD AUTO: 0.22 X10(3) UL (ref 0–0.7)
EOSINOPHIL NFR BLD AUTO: 4.7 %
ERYTHROCYTE [DISTWIDTH] IN BLOOD BY AUTOMATED COUNT: 13.6 %
GFR SERPLBLD BASED ON 1.73 SQ M-ARVRAT: 94 ML/MIN/1.73M2 (ref 60–?)
GLOBULIN PLAS-MCNC: 3.4 G/DL (ref 2.8–4.4)
GLUCOSE BLD-MCNC: 81 MG/DL (ref 70–99)
HCT VFR BLD AUTO: 38.2 %
HGB BLD-MCNC: 12.4 G/DL
IMM GRANULOCYTES # BLD AUTO: 0.01 X10(3) UL (ref 0–1)
IMM GRANULOCYTES NFR BLD: 0.2 %
INR BLD: 0.91 (ref 0.85–1.16)
LYMPHOCYTES # BLD AUTO: 1.97 X10(3) UL (ref 1–4)
LYMPHOCYTES NFR BLD AUTO: 42.1 %
MCH RBC QN AUTO: 30.5 PG (ref 26–34)
MCHC RBC AUTO-ENTMCNC: 32.5 G/DL (ref 31–37)
MCV RBC AUTO: 94.1 FL
MONOCYTES # BLD AUTO: 0.53 X10(3) UL (ref 0.1–1)
MONOCYTES NFR BLD AUTO: 11.3 %
NEUTROPHILS # BLD AUTO: 1.88 X10 (3) UL (ref 1.5–7.7)
NEUTROPHILS # BLD AUTO: 1.88 X10(3) UL (ref 1.5–7.7)
NEUTROPHILS NFR BLD AUTO: 40.2 %
OSMOLALITY SERPL CALC.SUM OF ELEC: 286 MOSM/KG (ref 275–295)
PLATELET # BLD AUTO: 225 10(3)UL (ref 150–450)
POTASSIUM SERPL-SCNC: 4.4 MMOL/L (ref 3.5–5.1)
PROT SERPL-MCNC: 7.3 G/DL (ref 6.4–8.2)
PROTHROMBIN TIME: 12.2 SECONDS (ref 11.6–14.8)
RBC # BLD AUTO: 4.06 X10(6)UL
RH BLOOD TYPE: NEGATIVE
SODIUM SERPL-SCNC: 138 MMOL/L (ref 136–145)
WBC # BLD AUTO: 4.7 X10(3) UL (ref 4–11)

## 2023-03-01 PROCEDURE — 86900 BLOOD TYPING SEROLOGIC ABO: CPT

## 2023-03-01 PROCEDURE — 86850 RBC ANTIBODY SCREEN: CPT

## 2023-03-01 PROCEDURE — 85025 COMPLETE CBC W/AUTO DIFF WBC: CPT

## 2023-03-01 PROCEDURE — 85730 THROMBOPLASTIN TIME PARTIAL: CPT

## 2023-03-01 PROCEDURE — 85610 PROTHROMBIN TIME: CPT

## 2023-03-01 PROCEDURE — 86901 BLOOD TYPING SEROLOGIC RH(D): CPT

## 2023-03-01 PROCEDURE — 87081 CULTURE SCREEN ONLY: CPT

## 2023-03-01 PROCEDURE — 36415 COLL VENOUS BLD VENIPUNCTURE: CPT

## 2023-03-01 PROCEDURE — 80053 COMPREHEN METABOLIC PANEL: CPT

## 2023-03-01 RX ORDER — DICLOFENAC SODIUM 75 MG/1
TABLET, DELAYED RELEASE ORAL
Qty: 90 TABLET | Refills: 0 | Status: SHIPPED | OUTPATIENT
Start: 2023-03-01

## 2023-03-06 ENCOUNTER — ORDER TRANSCRIPTION (OUTPATIENT)
Dept: PHYSICAL THERAPY | Facility: HOSPITAL | Age: 63
End: 2023-03-06

## 2023-03-06 DIAGNOSIS — Z96.651 STATUS POST TOTAL KNEE REPLACEMENT, RIGHT: Primary | ICD-10-CM

## 2023-03-14 ENCOUNTER — ANESTHESIA EVENT (OUTPATIENT)
Dept: SURGERY | Facility: HOSPITAL | Age: 63
End: 2023-03-14
Payer: MEDICARE

## 2023-03-15 ENCOUNTER — HOSPITAL ENCOUNTER (OUTPATIENT)
Facility: HOSPITAL | Age: 63
Setting detail: HOSPITAL OUTPATIENT SURGERY
Discharge: HOME HEALTH | End: 2023-03-15
Attending: ORTHOPAEDIC SURGERY | Admitting: ORTHOPAEDIC SURGERY
Payer: MEDICARE

## 2023-03-15 ENCOUNTER — ANESTHESIA (OUTPATIENT)
Dept: SURGERY | Facility: HOSPITAL | Age: 63
End: 2023-03-15
Payer: MEDICARE

## 2023-03-15 VITALS
RESPIRATION RATE: 18 BRPM | TEMPERATURE: 98 F | BODY MASS INDEX: 20.98 KG/M2 | HEART RATE: 57 BPM | WEIGHT: 114 LBS | DIASTOLIC BLOOD PRESSURE: 52 MMHG | SYSTOLIC BLOOD PRESSURE: 110 MMHG | HEIGHT: 62 IN | OXYGEN SATURATION: 100 %

## 2023-03-15 DIAGNOSIS — M17.11 PRIMARY OSTEOARTHRITIS OF RIGHT KNEE: ICD-10-CM

## 2023-03-15 DIAGNOSIS — Z01.818 PRE-OP TESTING: Primary | ICD-10-CM

## 2023-03-15 LAB — SARS-COV-2 RNA RESP QL NAA+PROBE: NOT DETECTED

## 2023-03-15 PROCEDURE — 97110 THERAPEUTIC EXERCISES: CPT

## 2023-03-15 PROCEDURE — 97116 GAIT TRAINING THERAPY: CPT

## 2023-03-15 PROCEDURE — 76942 ECHO GUIDE FOR BIOPSY: CPT | Performed by: ANESTHESIOLOGY

## 2023-03-15 PROCEDURE — 97161 PT EVAL LOW COMPLEX 20 MIN: CPT

## 2023-03-15 PROCEDURE — 0SRC0J9 REPLACEMENT OF RIGHT KNEE JOINT WITH SYNTHETIC SUBSTITUTE, CEMENTED, OPEN APPROACH: ICD-10-PCS | Performed by: ORTHOPAEDIC SURGERY

## 2023-03-15 DEVICE — ATTUNE KNEE SYSTEM TIBIAL BASE ROTATING PLATFORM SIZE 4 CEMENTED
Type: IMPLANTABLE DEVICE | Site: KNEE | Status: FUNCTIONAL
Brand: ATTUNE

## 2023-03-15 DEVICE — ATTUNE KNEE SYSTEM TIBIAL INSERT ROTATING PLATFORM POSTERIOR STABILIZED 5 8MM AOX
Type: IMPLANTABLE DEVICE | Site: KNEE | Status: FUNCTIONAL
Brand: ATTUNE

## 2023-03-15 DEVICE — ATTUNE KNEE SYSTEM FEMORAL POSTERIOR STABILIZED SIZE 5 RIGHT CEMENTED
Type: IMPLANTABLE DEVICE | Site: KNEE | Status: FUNCTIONAL
Brand: ATTUNE

## 2023-03-15 DEVICE — ATTUNE PATELLA MEDIALIZED DOME 38MM CEMENTED AOX
Type: IMPLANTABLE DEVICE | Site: KNEE | Status: FUNCTIONAL
Brand: ATTUNE

## 2023-03-15 DEVICE — SMARTSET HV HIGH VISCOSITY BONE CEMENT 40G
Type: IMPLANTABLE DEVICE | Site: KNEE | Status: FUNCTIONAL
Brand: SMARTSET

## 2023-03-15 RX ORDER — ASPIRIN 325 MG
325 TABLET, DELAYED RELEASE (ENTERIC COATED) ORAL 2 TIMES DAILY
OUTPATIENT
Start: 2023-03-15

## 2023-03-15 RX ORDER — TRANEXAMIC ACID 10 MG/ML
1000 INJECTION, SOLUTION INTRAVENOUS ONCE
Status: DISCONTINUED | OUTPATIENT
Start: 2023-03-15 | End: 2023-03-15 | Stop reason: HOSPADM

## 2023-03-15 RX ORDER — SODIUM CHLORIDE, SODIUM LACTATE, POTASSIUM CHLORIDE, CALCIUM CHLORIDE 600; 310; 30; 20 MG/100ML; MG/100ML; MG/100ML; MG/100ML
INJECTION, SOLUTION INTRAVENOUS CONTINUOUS
Status: DISCONTINUED | OUTPATIENT
Start: 2023-03-15 | End: 2023-03-15

## 2023-03-15 RX ORDER — HYDROCODONE BITARTRATE AND ACETAMINOPHEN 10; 325 MG/1; MG/1
1 TABLET ORAL EVERY 4 HOURS PRN
Qty: 60 TABLET | Refills: 0 | Status: SHIPPED | OUTPATIENT
Start: 2023-03-15

## 2023-03-15 RX ORDER — HYDROMORPHONE HYDROCHLORIDE 1 MG/ML
0.4 INJECTION, SOLUTION INTRAMUSCULAR; INTRAVENOUS; SUBCUTANEOUS EVERY 5 MIN PRN
Status: DISCONTINUED | OUTPATIENT
Start: 2023-03-15 | End: 2023-03-15

## 2023-03-15 RX ORDER — HYDROMORPHONE HYDROCHLORIDE 1 MG/ML
0.2 INJECTION, SOLUTION INTRAMUSCULAR; INTRAVENOUS; SUBCUTANEOUS EVERY 2 HOUR PRN
Status: DISCONTINUED | OUTPATIENT
Start: 2023-03-15 | End: 2023-03-15

## 2023-03-15 RX ORDER — ACETAMINOPHEN 325 MG/1
650 TABLET ORAL 4 TIMES DAILY
Status: DISCONTINUED | OUTPATIENT
Start: 2023-03-15 | End: 2023-03-15

## 2023-03-15 RX ORDER — KETOROLAC TROMETHAMINE 30 MG/ML
30 INJECTION, SOLUTION INTRAMUSCULAR; INTRAVENOUS EVERY 6 HOURS
Status: DISCONTINUED | OUTPATIENT
Start: 2023-03-15 | End: 2023-03-15

## 2023-03-15 RX ORDER — TIZANIDINE 2 MG/1
1 TABLET ORAL EVERY 6 HOURS PRN
Status: DISCONTINUED | OUTPATIENT
Start: 2023-03-15 | End: 2023-03-15

## 2023-03-15 RX ORDER — TRANEXAMIC ACID 10 MG/ML
INJECTION, SOLUTION INTRAVENOUS
Status: COMPLETED
Start: 2023-03-15 | End: 2023-03-15

## 2023-03-15 RX ORDER — SODIUM CHLORIDE, SODIUM LACTATE, POTASSIUM CHLORIDE, CALCIUM CHLORIDE 600; 310; 30; 20 MG/100ML; MG/100ML; MG/100ML; MG/100ML
INJECTION, SOLUTION INTRAVENOUS CONTINUOUS
OUTPATIENT
Start: 2023-03-15

## 2023-03-15 RX ORDER — HYDROCODONE BITARTRATE AND ACETAMINOPHEN 5; 325 MG/1; MG/1
1 TABLET ORAL ONCE AS NEEDED
Status: COMPLETED | OUTPATIENT
Start: 2023-03-15 | End: 2023-03-15

## 2023-03-15 RX ORDER — HYDROCODONE BITARTRATE AND ACETAMINOPHEN 5; 325 MG/1; MG/1
TABLET ORAL
Status: COMPLETED
Start: 2023-03-15 | End: 2023-03-15

## 2023-03-15 RX ORDER — DEXAMETHASONE SODIUM PHOSPHATE 4 MG/ML
VIAL (ML) INJECTION AS NEEDED
Status: DISCONTINUED | OUTPATIENT
Start: 2023-03-15 | End: 2023-03-15 | Stop reason: SURG

## 2023-03-15 RX ORDER — METOCLOPRAMIDE HYDROCHLORIDE 5 MG/ML
10 INJECTION INTRAMUSCULAR; INTRAVENOUS EVERY 8 HOURS PRN
Status: DISCONTINUED | OUTPATIENT
Start: 2023-03-15 | End: 2023-03-15

## 2023-03-15 RX ORDER — CYCLOBENZAPRINE HCL 10 MG
10 TABLET ORAL EVERY 8 HOURS PRN
OUTPATIENT
Start: 2023-03-15

## 2023-03-15 RX ORDER — DIPHENHYDRAMINE HYDROCHLORIDE 50 MG/ML
12.5 INJECTION INTRAMUSCULAR; INTRAVENOUS EVERY 4 HOURS PRN
OUTPATIENT
Start: 2023-03-15

## 2023-03-15 RX ORDER — ACETAMINOPHEN 500 MG
1000 TABLET ORAL ONCE AS NEEDED
Status: COMPLETED | OUTPATIENT
Start: 2023-03-15 | End: 2023-03-15

## 2023-03-15 RX ORDER — ACETAMINOPHEN 500 MG
1000 TABLET ORAL ONCE
Status: DISCONTINUED | OUTPATIENT
Start: 2023-03-15 | End: 2023-03-15 | Stop reason: HOSPADM

## 2023-03-15 RX ORDER — ONDANSETRON 2 MG/ML
4 INJECTION INTRAMUSCULAR; INTRAVENOUS EVERY 6 HOURS PRN
OUTPATIENT
Start: 2023-03-15

## 2023-03-15 RX ORDER — LABETALOL HYDROCHLORIDE 5 MG/ML
5 INJECTION, SOLUTION INTRAVENOUS EVERY 5 MIN PRN
Status: DISCONTINUED | OUTPATIENT
Start: 2023-03-15 | End: 2023-03-15

## 2023-03-15 RX ORDER — OXYCODONE HYDROCHLORIDE 5 MG/1
2.5 TABLET ORAL EVERY 4 HOURS PRN
Status: DISCONTINUED | OUTPATIENT
Start: 2023-03-15 | End: 2023-03-15

## 2023-03-15 RX ORDER — DIPHENHYDRAMINE HCL 25 MG
25 CAPSULE ORAL EVERY 4 HOURS PRN
OUTPATIENT
Start: 2023-03-15

## 2023-03-15 RX ORDER — OXYCODONE HYDROCHLORIDE 5 MG/1
5 TABLET ORAL EVERY 4 HOURS PRN
Status: DISCONTINUED | OUTPATIENT
Start: 2023-03-15 | End: 2023-03-15

## 2023-03-15 RX ORDER — DEXAMETHASONE SODIUM PHOSPHATE 10 MG/ML
8 INJECTION, SOLUTION INTRAMUSCULAR; INTRAVENOUS ONCE
Status: DISCONTINUED | OUTPATIENT
Start: 2023-03-16 | End: 2023-03-15

## 2023-03-15 RX ORDER — POLYETHYLENE GLYCOL 3350 17 G/17G
17 POWDER, FOR SOLUTION ORAL DAILY PRN
OUTPATIENT
Start: 2023-03-15

## 2023-03-15 RX ORDER — HYDROMORPHONE HYDROCHLORIDE 1 MG/ML
0.6 INJECTION, SOLUTION INTRAMUSCULAR; INTRAVENOUS; SUBCUTANEOUS EVERY 5 MIN PRN
Status: DISCONTINUED | OUTPATIENT
Start: 2023-03-15 | End: 2023-03-15

## 2023-03-15 RX ORDER — HYDROMORPHONE HYDROCHLORIDE 1 MG/ML
0.4 INJECTION, SOLUTION INTRAMUSCULAR; INTRAVENOUS; SUBCUTANEOUS EVERY 2 HOUR PRN
Status: DISCONTINUED | OUTPATIENT
Start: 2023-03-15 | End: 2023-03-15

## 2023-03-15 RX ORDER — TRANEXAMIC ACID 10 MG/ML
INJECTION, SOLUTION INTRAVENOUS AS NEEDED
Status: DISCONTINUED | OUTPATIENT
Start: 2023-03-15 | End: 2023-03-15 | Stop reason: SURG

## 2023-03-15 RX ORDER — HYDROCODONE BITARTRATE AND ACETAMINOPHEN 5; 325 MG/1; MG/1
2 TABLET ORAL ONCE AS NEEDED
Status: COMPLETED | OUTPATIENT
Start: 2023-03-15 | End: 2023-03-15

## 2023-03-15 RX ORDER — TRANEXAMIC ACID 10 MG/ML
1000 INJECTION, SOLUTION INTRAVENOUS ONCE
Status: COMPLETED | OUTPATIENT
Start: 2023-03-15 | End: 2023-03-15

## 2023-03-15 RX ORDER — SENNOSIDES 8.6 MG
17.2 TABLET ORAL NIGHTLY
OUTPATIENT
Start: 2023-03-15

## 2023-03-15 RX ORDER — ACETAMINOPHEN 325 MG/1
TABLET ORAL
Status: COMPLETED
Start: 2023-03-15 | End: 2023-03-15

## 2023-03-15 RX ORDER — ONDANSETRON 2 MG/ML
INJECTION INTRAMUSCULAR; INTRAVENOUS AS NEEDED
Status: DISCONTINUED | OUTPATIENT
Start: 2023-03-15 | End: 2023-03-15 | Stop reason: SURG

## 2023-03-15 RX ORDER — ONDANSETRON 2 MG/ML
4 INJECTION INTRAMUSCULAR; INTRAVENOUS EVERY 6 HOURS PRN
Status: DISCONTINUED | OUTPATIENT
Start: 2023-03-15 | End: 2023-03-15

## 2023-03-15 RX ORDER — DIPHENHYDRAMINE HYDROCHLORIDE 50 MG/ML
25 INJECTION INTRAMUSCULAR; INTRAVENOUS ONCE AS NEEDED
OUTPATIENT
Start: 2023-03-15 | End: 2023-03-15

## 2023-03-15 RX ORDER — SODIUM PHOSPHATE, DIBASIC AND SODIUM PHOSPHATE, MONOBASIC 7; 19 G/133ML; G/133ML
1 ENEMA RECTAL ONCE AS NEEDED
OUTPATIENT
Start: 2023-03-15

## 2023-03-15 RX ORDER — HYDROMORPHONE HYDROCHLORIDE 1 MG/ML
0.2 INJECTION, SOLUTION INTRAMUSCULAR; INTRAVENOUS; SUBCUTANEOUS EVERY 5 MIN PRN
Status: DISCONTINUED | OUTPATIENT
Start: 2023-03-15 | End: 2023-03-15

## 2023-03-15 RX ORDER — ROPIVACAINE HYDROCHLORIDE 5 MG/ML
INJECTION, SOLUTION EPIDURAL; INFILTRATION; PERINEURAL AS NEEDED
Status: DISCONTINUED | OUTPATIENT
Start: 2023-03-15 | End: 2023-03-15 | Stop reason: SURG

## 2023-03-15 RX ORDER — CEFAZOLIN SODIUM/WATER 2 G/20 ML
2 SYRINGE (ML) INTRAVENOUS EVERY 8 HOURS
OUTPATIENT
Start: 2023-03-15 | End: 2023-03-15

## 2023-03-15 RX ORDER — DOCUSATE SODIUM 100 MG/1
100 CAPSULE, LIQUID FILLED ORAL 2 TIMES DAILY
OUTPATIENT
Start: 2023-03-15

## 2023-03-15 RX ORDER — MIDAZOLAM HYDROCHLORIDE 1 MG/ML
INJECTION INTRAMUSCULAR; INTRAVENOUS AS NEEDED
Status: DISCONTINUED | OUTPATIENT
Start: 2023-03-15 | End: 2023-03-15 | Stop reason: SURG

## 2023-03-15 RX ORDER — TRAMADOL HYDROCHLORIDE 50 MG/1
50 TABLET ORAL EVERY 6 HOURS SCHEDULED
Status: DISCONTINUED | OUTPATIENT
Start: 2023-03-15 | End: 2023-03-15

## 2023-03-15 RX ORDER — DEXAMETHASONE SODIUM PHOSPHATE 10 MG/ML
INJECTION, SOLUTION INTRAMUSCULAR; INTRAVENOUS AS NEEDED
Status: DISCONTINUED | OUTPATIENT
Start: 2023-03-15 | End: 2023-03-15 | Stop reason: SURG

## 2023-03-15 RX ORDER — METOCLOPRAMIDE HYDROCHLORIDE 5 MG/ML
10 INJECTION INTRAMUSCULAR; INTRAVENOUS EVERY 8 HOURS PRN
OUTPATIENT
Start: 2023-03-15

## 2023-03-15 RX ORDER — NALOXONE HYDROCHLORIDE 0.4 MG/ML
80 INJECTION, SOLUTION INTRAMUSCULAR; INTRAVENOUS; SUBCUTANEOUS AS NEEDED
Status: DISCONTINUED | OUTPATIENT
Start: 2023-03-15 | End: 2023-03-15

## 2023-03-15 RX ORDER — MELATONIN
325
OUTPATIENT
Start: 2023-03-15

## 2023-03-15 RX ORDER — BISACODYL 10 MG
10 SUPPOSITORY, RECTAL RECTAL
OUTPATIENT
Start: 2023-03-15

## 2023-03-15 RX ORDER — GLYCOPYRROLATE 0.2 MG/ML
INJECTION, SOLUTION INTRAMUSCULAR; INTRAVENOUS AS NEEDED
Status: DISCONTINUED | OUTPATIENT
Start: 2023-03-15 | End: 2023-03-15 | Stop reason: SURG

## 2023-03-15 RX ORDER — CEFAZOLIN SODIUM/WATER 2 G/20 ML
SYRINGE (ML) INTRAVENOUS AS NEEDED
Status: DISCONTINUED | OUTPATIENT
Start: 2023-03-15 | End: 2023-03-15 | Stop reason: SURG

## 2023-03-15 RX ORDER — ACETAMINOPHEN 325 MG/1
650 TABLET ORAL ONCE
Status: COMPLETED | OUTPATIENT
Start: 2023-03-15 | End: 2023-03-15

## 2023-03-15 RX ADMIN — ROPIVACAINE HYDROCHLORIDE 20 ML: 5 INJECTION, SOLUTION EPIDURAL; INFILTRATION; PERINEURAL at 08:12:00

## 2023-03-15 RX ADMIN — ONDANSETRON 4 MG: 2 INJECTION INTRAMUSCULAR; INTRAVENOUS at 07:23:00

## 2023-03-15 RX ADMIN — DEXAMETHASONE SODIUM PHOSPHATE 4 MG: 4 MG/ML VIAL (ML) INJECTION at 07:23:00

## 2023-03-15 RX ADMIN — GLYCOPYRROLATE 0.2 MG: 0.2 INJECTION, SOLUTION INTRAMUSCULAR; INTRAVENOUS at 07:23:00

## 2023-03-15 RX ADMIN — MIDAZOLAM HYDROCHLORIDE 2 MG: 1 INJECTION INTRAMUSCULAR; INTRAVENOUS at 07:06:00

## 2023-03-15 RX ADMIN — TRANEXAMIC ACID 1000 MG: 10 INJECTION, SOLUTION INTRAVENOUS at 07:18:00

## 2023-03-15 RX ADMIN — CEFAZOLIN SODIUM/WATER 2 G: 2 G/20 ML SYRINGE (ML) INTRAVENOUS at 07:15:00

## 2023-03-15 RX ADMIN — DEXAMETHASONE SODIUM PHOSPHATE 4 MG: 10 INJECTION, SOLUTION INTRAMUSCULAR; INTRAVENOUS at 08:12:00

## 2023-03-15 RX ADMIN — SODIUM CHLORIDE, SODIUM LACTATE, POTASSIUM CHLORIDE, CALCIUM CHLORIDE: 600; 310; 30; 20 INJECTION, SOLUTION INTRAVENOUS at 07:51:00

## 2023-03-15 RX ADMIN — SODIUM CHLORIDE, SODIUM LACTATE, POTASSIUM CHLORIDE, CALCIUM CHLORIDE: 600; 310; 30; 20 INJECTION, SOLUTION INTRAVENOUS at 07:03:00

## 2023-03-15 NOTE — INTERVAL H&P NOTE
Pre-op Diagnosis: PRIMARY OSTEOARTHRITIS OF RIGHT KNEE    The above referenced H&P was reviewed by Kitty Torres MD on 3/15/2023, the patient was examined and no significant changes have occurred in the patient's condition since the H&P was performed. I discussed with the patient and/or legal representative the potential benefits, risks and side effects of this procedure; the likelihood of the patient achieving goals; and potential problems that might occur during recuperation. I discussed reasonable alternatives to the procedure, including risks, benefits and side effects related to the alternatives and risks related to not receiving this procedure. We will proceed with procedure as planned.

## 2023-03-15 NOTE — CM/SW NOTE
Pt s/p R TKA. She has a pre-op plan with [facility]. Referral sent to agency in [address] [place] [address]. Therapy evals pending. CM/SW to remain available for discharge planning.     Dorinda Phoenix, BSN, RN-BC    [id number]

## 2023-03-15 NOTE — ANESTHESIA PROCEDURE NOTES
Spinal Block    Date/Time: 3/15/2023 7:06 AM    Performed by: Romeo Miller MD  Authorized by: Romeo Miller MD      General Information and Staff    Start Time:  3/15/2023 7:06 AM  End Time:  3/15/2023 7:10 AM  Anesthesiologist:  Romeo Miller MD  Performed by: Anesthesiologist  Site identification: surface landmarks    Preanesthetic Checklist: patient identified, IV checked, risks and benefits discussed, monitors and equipment checked, pre-op evaluation, timeout performed, anesthesia consent and sterile technique used      Procedure Details    Patient Position:  Sitting  Prep: ChloraPrep    Monitoring:  Cardiac monitor, heart rate and continuous pulse ox  Approach:  Midline  Location:  L4-5  Injection Technique:  Single-shot    Needle    Needle Type:  Sprotte  Needle Gauge:  24 G  Needle Length:  4 in    Assessment    Sensory Level:   Events: clear CSF, CSF aspirated, well tolerated and blood negative      Additional Comments     Patient in sitting position with ASA monitors on. Lumbar area prepped and draped in sterile fashion. Lido 1% skin infiltration at L4-L5 interspace. 21G 40mm introducer needle placed midline. 24G Sprotte 4\" spinal needle advanced through introducer easily into intrathecal space with clear CSF free flow. After CSF aspirate, mepivicaine 2%PF 1.8ml given intrathecally. No heme or paresthesias noted throughout. Patient tolerated procedure well without any apparent complications and placed supine in preparation for surgery.

## 2023-03-15 NOTE — ANESTHESIA PROCEDURE NOTES
Regional Block    Date/Time: 3/15/2023 8:11 AM    Performed by: Maya Real MD  Authorized by: Maya Real MD      General Information and Staff    Start Time:  3/15/2023 8:11 AM  End Time:  3/15/2023 8:12 AM  Anesthesiologist:  Maya Real MD  Performed by: Anesthesiologist  Patient Location:  OR      Site Identification: real time ultrasound guided and image stored and retrievable    Block site/laterality marked before start: site marked  Reason for Block: at surgeon's request and post-op pain management    Preanesthetic Checklist: 2 patient identifers, IV checked, risks and benefits discussed, monitors and equipment checked, pre-op evaluation, timeout performed, anesthesia consent, sterile technique used, no prohibitive neurological deficits and no local skin infection at insertion site      Procedure Details    Patient Position:  Supine  Prep: ChloraPrep    Monitoring:  Cardiac monitor, continuous pulse ox and blood pressure cuff  Block Type: Adductor canal  Laterality:  Right  Injection Technique:  Single-shot    Needle    Needle Type:  Short-bevel and echogenic  Needle Gauge:  21 G  Needle Length:  100 mm  Needle Localization:  Ultrasound guidance  Reason for Ultrasound Use: appropriate spread of the medication was noted in real time and no ultrasound evidence of intravascular and/or intraneural injection            Assessment    Injection Assessment:  Good spread noted, negative resistance, negative aspiration for heme, incremental injection and low pressure  Heart Rate Change: No    - Patient tolerated block procedure well without evidence of immediate block related complications. Medications  3/15/2023 8:11 AM      Additional Comments    Medication:  Ropivacaine 0.5% 20mL with 1:200,000 epi and dexamethasone 4mg PF.

## 2023-03-15 NOTE — OPERATIVE REPORT
DATE OF PROCEDURE:  3/15/2023  PREOPERATIVE DIAGNOSIS: Right knee osteoarthritis. POSTOPERATIVE DIAGNOSIS: Right knee osteoarthritis. PROCEDURE PERFORMED: Cemented right total knee arthroplasty. SURGEON:  Akosua Tilley M.D. FIRST ASSISTANT: Maribell Mallory PA-C.   SECOND ASSISTANT:  Neal Vallejo    ANESTHESIA: Spinal plus femoral nerve block. INDICATIONS: The patient has severe knee osteoarthritis that has not responded to conservative treatment including antiinflammatories and injections. The patient's pain has limited activities of daily living including ambulation and exercise. DESCRIPTION OF PROCEDURE: The patient was brought to the operating room and under spinal anesthetic, the right lower extremity was sterilely prepped and draped. Preoperative antibiotics had been administered. A high thigh tourniquet was inflated to 300. It was medically necessary for the presence of the assistants listed for safe patient care. This included positioning of the leg, holding of retractors to protect the underlying neurovascular structures and soft tissues. It was required for the assistants to hold retractors to protect soft tissues while the primary surgeon made the bone cuts on the femur, the tibia, and the patella. The assistants also held the leg stable and positioned while the primary surgeon made the approach, and the bone cuts, and affixed the guides and later the components to the bones. An anterior incision was made, medial and lateral flaps were created. A medial parapatellar arthrotomy was created. The patella was everted, the knee was flexed. Severe arthritic changes with areas of eburnated bone were noted. A drill was placed in the distal femur and a 6-degree 10 mm cut was made. The [employer] rotating platform system was used. Sizing was performed and a size 5 cutting block was selected to make anterior, posterior, chamfer and box cuts for a cruciate-sacrificing knee.  Attention was turned to the tibia. An external alignment guide was utilized to take 10 mm off the less involved lateral side. Sizing was performed and a size 4 fit well. There were equal medial and lateral gaps when checked with a spacer. Equal flexion and extension gaps were obtained. The stem cut was made for the tibia and 10 mm was taken off the posterior patella. Sizing was performed and a size 38 patella was selected. Three drill holes were placed. Trial was performed with a 5 femur, 4 tibia, 8 mm insert and 38 mm patella. This gave good varus and valgus stability, good flexion and extension, good tracking of the patella. Distal femoral condyle drill holes were made using the trial template. Final components the same size as the trials were utilized. Trial components were removed. Bony surfaces were irrigated and dried. Final components were precoated with cement which had been mixed under vacuum conditions. The bony surfaces were precoated as well. Components were impacted into place and excess cement removed. The knee was held in extension while the cement hardened. The tourniquet was let down, bleeders were cauterized. Lavage was performed. The arthrotomy was closed with a barbed running suture. Subcutaneous tissue was closed after further irrigation. This was closed with inverted 2-0 Vicryl for the subcutaneous tissue and staples for the skin. An aquacell dressing plus gauze covering was applied. A lightly compressive dressing from toe to groin was applied. Estimated blood loss was 150 mL. There were no complications. There were no specimens. The patient was brought to the PACU in stable condition.

## 2023-03-16 ENCOUNTER — TELEPHONE (OUTPATIENT)
Dept: PHYSICAL THERAPY | Facility: HOSPITAL | Age: 63
End: 2023-03-16

## 2023-03-16 ENCOUNTER — PATIENT MESSAGE (OUTPATIENT)
Dept: FAMILY MEDICINE CLINIC | Facility: CLINIC | Age: 63
End: 2023-03-16

## 2023-03-17 ENCOUNTER — OFFICE VISIT (OUTPATIENT)
Dept: PHYSICAL THERAPY | Age: 63
End: 2023-03-17
Attending: ORTHOPAEDIC SURGERY
Payer: COMMERCIAL

## 2023-03-17 DIAGNOSIS — Z96.651 STATUS POST TOTAL KNEE REPLACEMENT, RIGHT: ICD-10-CM

## 2023-03-17 PROCEDURE — 97110 THERAPEUTIC EXERCISES: CPT

## 2023-03-17 PROCEDURE — 97162 PT EVAL MOD COMPLEX 30 MIN: CPT

## 2023-03-20 ENCOUNTER — OFFICE VISIT (OUTPATIENT)
Dept: PHYSICAL THERAPY | Age: 63
End: 2023-03-20
Attending: ORTHOPAEDIC SURGERY
Payer: COMMERCIAL

## 2023-03-20 PROCEDURE — 97110 THERAPEUTIC EXERCISES: CPT

## 2023-03-20 PROCEDURE — 97140 MANUAL THERAPY 1/> REGIONS: CPT

## 2023-03-23 ENCOUNTER — OFFICE VISIT (OUTPATIENT)
Dept: PHYSICAL THERAPY | Age: 63
End: 2023-03-23
Attending: ORTHOPAEDIC SURGERY
Payer: COMMERCIAL

## 2023-03-23 PROCEDURE — 97110 THERAPEUTIC EXERCISES: CPT

## 2023-03-23 PROCEDURE — 97140 MANUAL THERAPY 1/> REGIONS: CPT

## 2023-03-28 ENCOUNTER — OFFICE VISIT (OUTPATIENT)
Dept: PHYSICAL THERAPY | Age: 63
End: 2023-03-28
Attending: ORTHOPAEDIC SURGERY
Payer: COMMERCIAL

## 2023-03-28 PROCEDURE — 97110 THERAPEUTIC EXERCISES: CPT

## 2023-03-28 PROCEDURE — 97140 MANUAL THERAPY 1/> REGIONS: CPT

## 2023-03-31 ENCOUNTER — OFFICE VISIT (OUTPATIENT)
Dept: PHYSICAL THERAPY | Age: 63
End: 2023-03-31
Attending: ORTHOPAEDIC SURGERY
Payer: COMMERCIAL

## 2023-03-31 PROCEDURE — 97110 THERAPEUTIC EXERCISES: CPT

## 2023-03-31 PROCEDURE — 97140 MANUAL THERAPY 1/> REGIONS: CPT

## 2023-04-04 ENCOUNTER — OFFICE VISIT (OUTPATIENT)
Dept: PHYSICAL THERAPY | Age: 63
End: 2023-04-04
Attending: ORTHOPAEDIC SURGERY
Payer: MEDICARE

## 2023-04-04 PROCEDURE — 97110 THERAPEUTIC EXERCISES: CPT

## 2023-04-04 PROCEDURE — 97140 MANUAL THERAPY 1/> REGIONS: CPT

## 2023-04-07 ENCOUNTER — OFFICE VISIT (OUTPATIENT)
Dept: PHYSICAL THERAPY | Age: 63
End: 2023-04-07
Attending: ORTHOPAEDIC SURGERY
Payer: MEDICARE

## 2023-04-07 PROCEDURE — 97110 THERAPEUTIC EXERCISES: CPT

## 2023-04-07 PROCEDURE — 97140 MANUAL THERAPY 1/> REGIONS: CPT

## 2023-04-10 DIAGNOSIS — F41.9 ANXIETY: ICD-10-CM

## 2023-04-11 ENCOUNTER — OFFICE VISIT (OUTPATIENT)
Dept: PHYSICAL THERAPY | Age: 63
End: 2023-04-11
Attending: ORTHOPAEDIC SURGERY
Payer: MEDICARE

## 2023-04-11 ENCOUNTER — OFFICE VISIT (OUTPATIENT)
Dept: FAMILY MEDICINE CLINIC | Facility: CLINIC | Age: 63
End: 2023-04-11
Payer: MEDICARE

## 2023-04-11 VITALS
HEIGHT: 63.2 IN | TEMPERATURE: 99 F | OXYGEN SATURATION: 99 % | DIASTOLIC BLOOD PRESSURE: 80 MMHG | SYSTOLIC BLOOD PRESSURE: 130 MMHG | WEIGHT: 111 LBS | BODY MASS INDEX: 19.42 KG/M2 | HEART RATE: 71 BPM | RESPIRATION RATE: 19 BRPM

## 2023-04-11 DIAGNOSIS — R23.8 SKIN IRRITATION: ICD-10-CM

## 2023-04-11 DIAGNOSIS — Z01.419 PAP TEST, AS PART OF ROUTINE GYNECOLOGICAL EXAMINATION: Primary | ICD-10-CM

## 2023-04-11 PROCEDURE — 97140 MANUAL THERAPY 1/> REGIONS: CPT

## 2023-04-11 PROCEDURE — 97110 THERAPEUTIC EXERCISES: CPT

## 2023-04-11 RX ORDER — BUPROPION HYDROCHLORIDE 150 MG/1
TABLET, EXTENDED RELEASE ORAL
Qty: 90 TABLET | Refills: 1 | Status: SHIPPED | OUTPATIENT
Start: 2023-04-11

## 2023-04-11 RX ORDER — ONDANSETRON 4 MG/1
4 TABLET, FILM COATED ORAL EVERY 8 HOURS PRN
COMMUNITY
Start: 2023-03-20

## 2023-04-14 ENCOUNTER — OFFICE VISIT (OUTPATIENT)
Dept: PHYSICAL THERAPY | Age: 63
End: 2023-04-14
Attending: ORTHOPAEDIC SURGERY
Payer: MEDICARE

## 2023-04-14 PROCEDURE — 97110 THERAPEUTIC EXERCISES: CPT

## 2023-04-14 PROCEDURE — 97140 MANUAL THERAPY 1/> REGIONS: CPT

## 2023-04-18 ENCOUNTER — OFFICE VISIT (OUTPATIENT)
Dept: PHYSICAL THERAPY | Age: 63
End: 2023-04-18
Attending: ORTHOPAEDIC SURGERY
Payer: MEDICARE

## 2023-04-18 PROCEDURE — 97112 NEUROMUSCULAR REEDUCATION: CPT

## 2023-04-18 PROCEDURE — 97110 THERAPEUTIC EXERCISES: CPT

## 2023-04-18 PROCEDURE — 97140 MANUAL THERAPY 1/> REGIONS: CPT

## 2023-04-20 ENCOUNTER — OFFICE VISIT (OUTPATIENT)
Dept: PHYSICAL THERAPY | Age: 63
End: 2023-04-20
Attending: ORTHOPAEDIC SURGERY
Payer: MEDICARE

## 2023-04-20 PROCEDURE — 97140 MANUAL THERAPY 1/> REGIONS: CPT

## 2023-04-20 PROCEDURE — 97110 THERAPEUTIC EXERCISES: CPT

## 2023-04-20 PROCEDURE — 97112 NEUROMUSCULAR REEDUCATION: CPT

## 2023-04-21 ENCOUNTER — APPOINTMENT (OUTPATIENT)
Dept: PHYSICAL THERAPY | Age: 63
End: 2023-04-21
Attending: ORTHOPAEDIC SURGERY
Payer: MEDICARE

## 2023-04-25 ENCOUNTER — OFFICE VISIT (OUTPATIENT)
Dept: PHYSICAL THERAPY | Age: 63
End: 2023-04-25
Attending: ORTHOPAEDIC SURGERY
Payer: MEDICARE

## 2023-04-25 LAB — LAST PAP RESULT: NORMAL

## 2023-04-25 PROCEDURE — 97110 THERAPEUTIC EXERCISES: CPT

## 2023-04-25 PROCEDURE — 97140 MANUAL THERAPY 1/> REGIONS: CPT

## 2023-04-25 PROCEDURE — 97112 NEUROMUSCULAR REEDUCATION: CPT

## 2023-04-28 ENCOUNTER — OFFICE VISIT (OUTPATIENT)
Dept: PHYSICAL THERAPY | Age: 63
End: 2023-04-28
Attending: ORTHOPAEDIC SURGERY
Payer: MEDICARE

## 2023-04-28 PROCEDURE — 97112 NEUROMUSCULAR REEDUCATION: CPT

## 2023-04-28 PROCEDURE — 97140 MANUAL THERAPY 1/> REGIONS: CPT

## 2023-04-28 PROCEDURE — 97110 THERAPEUTIC EXERCISES: CPT

## 2023-05-02 ENCOUNTER — OFFICE VISIT (OUTPATIENT)
Dept: FAMILY MEDICINE CLINIC | Facility: CLINIC | Age: 63
End: 2023-05-02
Payer: MEDICARE

## 2023-05-02 ENCOUNTER — OFFICE VISIT (OUTPATIENT)
Dept: PHYSICAL THERAPY | Age: 63
End: 2023-05-02
Attending: ORTHOPAEDIC SURGERY
Payer: MEDICARE

## 2023-05-02 ENCOUNTER — MED REC SCAN ONLY (OUTPATIENT)
Dept: FAMILY MEDICINE CLINIC | Facility: CLINIC | Age: 63
End: 2023-05-02

## 2023-05-02 VITALS
BODY MASS INDEX: 20.19 KG/M2 | SYSTOLIC BLOOD PRESSURE: 128 MMHG | OXYGEN SATURATION: 97 % | HEIGHT: 62.75 IN | RESPIRATION RATE: 12 BRPM | HEART RATE: 76 BPM | DIASTOLIC BLOOD PRESSURE: 78 MMHG | WEIGHT: 112.5 LBS | TEMPERATURE: 98 F

## 2023-05-02 DIAGNOSIS — Z79.1 NSAID LONG-TERM USE: ICD-10-CM

## 2023-05-02 DIAGNOSIS — R92.8 ABNORMAL MAGNETIC RESONANCE IMAGING OF RIGHT BREAST: ICD-10-CM

## 2023-05-02 DIAGNOSIS — Z78.0 ASYMPTOMATIC MENOPAUSAL STATE: ICD-10-CM

## 2023-05-02 DIAGNOSIS — Z00.00 ENCOUNTER FOR MEDICARE ANNUAL WELLNESS EXAM: Primary | ICD-10-CM

## 2023-05-02 PROCEDURE — 97110 THERAPEUTIC EXERCISES: CPT

## 2023-05-02 PROCEDURE — G0402 INITIAL PREVENTIVE EXAM: HCPCS | Performed by: FAMILY MEDICINE

## 2023-05-02 PROCEDURE — 97112 NEUROMUSCULAR REEDUCATION: CPT

## 2023-05-02 RX ORDER — DICLOFENAC SODIUM 75 MG/1
75 TABLET, DELAYED RELEASE ORAL DAILY
Qty: 90 TABLET | Refills: 0 | COMMUNITY
Start: 2023-05-02

## 2023-05-03 ENCOUNTER — TELEPHONE (OUTPATIENT)
Dept: FAMILY MEDICINE CLINIC | Facility: CLINIC | Age: 63
End: 2023-05-03

## 2023-05-03 NOTE — TELEPHONE ENCOUNTER
Please fax bone density order to Katharine at [facility].   Fax:  [phone]

## 2023-05-05 ENCOUNTER — MED REC SCAN ONLY (OUTPATIENT)
Dept: FAMILY MEDICINE CLINIC | Facility: CLINIC | Age: 63
End: 2023-05-05

## 2023-05-05 ENCOUNTER — TELEPHONE (OUTPATIENT)
Dept: FAMILY MEDICINE CLINIC | Facility: CLINIC | Age: 63
End: 2023-05-05

## 2023-05-05 DIAGNOSIS — N63.0 BREAST MASS IN FEMALE: Primary | ICD-10-CM

## 2023-05-05 NOTE — TELEPHONE ENCOUNTER
Spoke w pt via phone  Provided Dr Olayinka Dugan contact info [email or web address] per [facility]

## 2023-05-09 ENCOUNTER — OFFICE VISIT (OUTPATIENT)
Facility: LOCATION | Age: 63
End: 2023-05-09
Payer: MEDICARE

## 2023-05-09 ENCOUNTER — OFFICE VISIT (OUTPATIENT)
Dept: PHYSICAL THERAPY | Age: 63
End: 2023-05-09
Attending: ORTHOPAEDIC SURGERY
Payer: MEDICARE

## 2023-05-09 VITALS — TEMPERATURE: 98 F | HEART RATE: 85 BPM

## 2023-05-09 DIAGNOSIS — R92.8 OTHER ABNORMAL AND INCONCLUSIVE FINDINGS ON DIAGNOSTIC IMAGING OF BREAST: ICD-10-CM

## 2023-05-09 DIAGNOSIS — N63.10 MASS OF RIGHT BREAST, UNSPECIFIED QUADRANT: Primary | ICD-10-CM

## 2023-05-09 PROCEDURE — 97110 THERAPEUTIC EXERCISES: CPT

## 2023-05-09 PROCEDURE — 99204 OFFICE O/P NEW MOD 45 MIN: CPT | Performed by: SURGERY

## 2023-05-09 PROCEDURE — 97112 NEUROMUSCULAR REEDUCATION: CPT

## 2023-05-12 ENCOUNTER — APPOINTMENT (OUTPATIENT)
Dept: PHYSICAL THERAPY | Age: 63
End: 2023-05-12
Attending: ORTHOPAEDIC SURGERY
Payer: MEDICARE

## 2023-05-16 ENCOUNTER — TELEPHONE (OUTPATIENT)
Dept: FAMILY MEDICINE CLINIC | Facility: CLINIC | Age: 63
End: 2023-05-16

## 2023-05-16 ENCOUNTER — OFFICE VISIT (OUTPATIENT)
Dept: PHYSICAL THERAPY | Age: 63
End: 2023-05-16
Attending: ORTHOPAEDIC SURGERY
Payer: MEDICARE

## 2023-05-16 PROCEDURE — 97112 NEUROMUSCULAR REEDUCATION: CPT

## 2023-05-16 PROCEDURE — 97110 THERAPEUTIC EXERCISES: CPT

## 2023-05-16 NOTE — TELEPHONE ENCOUNTER
PT. KEEPS GETTING REMINDERS THAT SHE IS DUE FOR A DEXA SCAN AND SHE TRIED TO CALL AND HER INS. TOLD SHE CANNOT GET IT UNTIL OCT. SHE IS ASKING FOR US TO QUIT SENDING REMINDERS UNTIL SHE IS DUE IN OCT.  RODRIGO

## 2023-05-19 ENCOUNTER — APPOINTMENT (OUTPATIENT)
Dept: PHYSICAL THERAPY | Age: 63
End: 2023-05-19
Attending: ORTHOPAEDIC SURGERY
Payer: MEDICARE

## 2023-05-23 ENCOUNTER — OFFICE VISIT (OUTPATIENT)
Dept: PHYSICAL THERAPY | Age: 63
End: 2023-05-23
Attending: ORTHOPAEDIC SURGERY
Payer: MEDICARE

## 2023-05-23 PROCEDURE — 97110 THERAPEUTIC EXERCISES: CPT

## 2023-05-23 PROCEDURE — 97112 NEUROMUSCULAR REEDUCATION: CPT

## 2023-05-30 ENCOUNTER — TELEPHONE (OUTPATIENT)
Dept: FAMILY MEDICINE CLINIC | Facility: CLINIC | Age: 63
End: 2023-05-30

## 2023-05-30 NOTE — TELEPHONE ENCOUNTER
Received breast biopsy results - per Dr. Mali Collins - benign biopsy, return to normal mammogram screen in 12 months. V.o. Dr. Mali Collins. Patient advised. Verbalizes understanding.

## 2023-05-31 NOTE — TELEPHONE ENCOUNTER
MRI result of 6/9/22 from [facility] faxed to [facility] at [phone] per patient's request.

## 2023-07-08 LAB
BUN: 14 MG/DL (ref 7–25)
CALCIUM: 9.6 MG/DL (ref 8.6–10.4)
CARBON DIOXIDE: 30 MMOL/L (ref 20–32)
CHLORIDE: 104 MMOL/L (ref 98–110)
CREATININE: 0.82 MG/DL (ref 0.5–1.05)
EGFR: 79 ML/MIN/1.73M2
GLUCOSE: 86 MG/DL (ref 65–99)
POTASSIUM: 4.7 MMOL/L (ref 3.5–5.3)
SODIUM: 141 MMOL/L (ref 135–146)

## 2023-08-28 ENCOUNTER — PATIENT MESSAGE (OUTPATIENT)
Dept: FAMILY MEDICINE CLINIC | Facility: CLINIC | Age: 63
End: 2023-08-28

## 2023-08-28 DIAGNOSIS — J45.21 MILD INTERMITTENT REACTIVE AIRWAY DISEASE WITH ACUTE EXACERBATION: ICD-10-CM

## 2023-08-28 DIAGNOSIS — L98.9 SKIN LESION OF LEFT ARM: Primary | ICD-10-CM

## 2023-09-01 RX ORDER — ALBUTEROL SULFATE 2.5 MG/3ML
2.5 SOLUTION RESPIRATORY (INHALATION) 4 TIMES DAILY
Qty: 75 ML | Refills: 2 | Status: SHIPPED | OUTPATIENT
Start: 2023-09-01

## 2023-10-17 ENCOUNTER — PATIENT MESSAGE (OUTPATIENT)
Dept: FAMILY MEDICINE CLINIC | Facility: CLINIC | Age: 63
End: 2023-10-17

## 2023-10-17 LAB
BUN: 11 MG/DL (ref 7–25)
CALCIUM: 9.5 MG/DL (ref 8.6–10.4)
CARBON DIOXIDE: 29 MMOL/L (ref 20–32)
CHLORIDE: 102 MMOL/L (ref 98–110)
CREATININE: 0.71 MG/DL (ref 0.5–1.05)
EGFR: 94 ML/MIN/1.73M2
GLUCOSE: 80 MG/DL (ref 65–99)
POTASSIUM: 4.8 MMOL/L (ref 3.5–5.3)
SODIUM: 138 MMOL/L (ref 135–146)

## 2023-10-17 NOTE — TELEPHONE ENCOUNTER
Reviewed message with patient  [facility] in [place] fax number entered in [facility] Medicine Comments.

## 2023-11-09 ENCOUNTER — TELEPHONE (OUTPATIENT)
Dept: FAMILY MEDICINE CLINIC | Facility: CLINIC | Age: 63
End: 2023-11-09

## 2023-11-09 NOTE — TELEPHONE ENCOUNTER
Received patient's Dexa scan results. After careful review, Dr. Harris Speak states \"this fits with Osteopenia, not Osteoporosis. Recommend: regular exercise, and supplement of Vitamin D 800 international units daily and 1500 mg of calcium (many forms of calcium, I recommend calcium carbonate CaCo3\". This nurse spoke with patient who asked that I send this information to her through her [employer].  [employer] message sent per request.

## 2023-11-21 ENCOUNTER — TELEPHONE (OUTPATIENT)
Dept: FAMILY MEDICINE CLINIC | Facility: CLINIC | Age: 63
End: 2023-11-21

## 2023-11-21 NOTE — TELEPHONE ENCOUNTER
Received fax from [address] with ultrasound breast diagnostic limited Right results done on 11-20-23    Dr Rangel carefully reviewed and documented:   Routine screen mammo Left 6 months and diagnostic mammo Right 6 months    Reminder set up in [facility]    Patient notified of above and verbalized understanding

## 2023-11-24 ENCOUNTER — PATIENT MESSAGE (OUTPATIENT)
Dept: FAMILY MEDICINE CLINIC | Facility: CLINIC | Age: 63
End: 2023-11-24

## 2023-11-24 RX ORDER — PREDNISONE 20 MG/1
20 TABLET ORAL 2 TIMES DAILY
Qty: 14 TABLET | Refills: 0 | Status: SHIPPED | OUTPATIENT
Start: 2023-11-24 | End: 2023-12-01

## 2023-12-05 ENCOUNTER — IMMUNIZATION (OUTPATIENT)
Dept: FAMILY MEDICINE CLINIC | Facility: CLINIC | Age: 63
End: 2023-12-05
Payer: MEDICARE

## 2023-12-05 DIAGNOSIS — Z23 NEED FOR VACCINATION: Primary | ICD-10-CM

## 2023-12-05 PROCEDURE — G0008 ADMIN INFLUENZA VIRUS VAC: HCPCS | Performed by: FAMILY MEDICINE

## 2023-12-05 PROCEDURE — 90662 IIV NO PRSV INCREASED AG IM: CPT | Performed by: FAMILY MEDICINE

## 2023-12-30 ENCOUNTER — PATIENT MESSAGE (OUTPATIENT)
Dept: FAMILY MEDICINE CLINIC | Facility: CLINIC | Age: 63
End: 2023-12-30

## 2023-12-30 DIAGNOSIS — Z79.1 ENCOUNTER FOR LONG-TERM (CURRENT) USE OF NON-STEROIDAL ANTI-INFLAMMATORIES: Primary | ICD-10-CM

## 2023-12-30 NOTE — TELEPHONE ENCOUNTER
Last BMP done 10-17-23    Last Chem profile 3-1-23, Last Lipids, TSH(w/reflex), CBC 9-24-21        Patient requesting order for BMP at [facility] any other blood work needed?

## 2023-12-30 NOTE — TELEPHONE ENCOUNTER
From: Criss Murphy  To: Neftaly Claire  Sent: 12/30/2023 11:03 AM CST  Subject: [facility] blood draw    Hello, I’ve scheduled my 3 month blood draw at [facility] in [place] on Jan 16th at 9:30. I’ve had issues in the past with their standing request not renewing. Just want to make sure I won’t have a problem. Thank you and Happy New.

## 2024-01-03 DIAGNOSIS — F41.9 ANXIETY: ICD-10-CM

## 2024-01-03 RX ORDER — BUPROPION HYDROCHLORIDE 150 MG/1
150 TABLET, EXTENDED RELEASE ORAL DAILY
Qty: 90 TABLET | Refills: 1 | Status: SHIPPED | OUTPATIENT
Start: 2024-01-03

## 2024-01-03 NOTE — TELEPHONE ENCOUNTER
Faxed request for refill on bupropion ER 150mg    LOV  5-2-23    LAST LAB  10-17-23    LAST RX  4-11-23  #90  RF 1    Next OV  No future appointments.

## 2024-01-10 RX ORDER — NYSTATIN 500000 [USP'U]/1
1 TABLET, COATED ORAL DAILY
Qty: 90 TABLET | Refills: 3 | Status: SHIPPED | OUTPATIENT
Start: 2024-01-10

## 2024-01-10 NOTE — TELEPHONE ENCOUNTER
Faxed request for refill on Nystatin [phone]    LOV  5-2-23    LAST LAB   10-17-23    LAST RX  9-12-23  #90    Next OV  No future appointments.

## 2024-01-17 DIAGNOSIS — Z79.1 ENCOUNTER FOR LONG-TERM (CURRENT) USE OF NON-STEROIDAL ANTI-INFLAMMATORIES: Primary | ICD-10-CM

## 2024-01-17 LAB
BUN: 13 MG/DL (ref 7–25)
CALCIUM: 10.1 MG/DL (ref 8.6–10.4)
CARBON DIOXIDE: 31 MMOL/L (ref 20–32)
CHLORIDE: 103 MMOL/L (ref 98–110)
CREATININE: 0.85 MG/DL (ref 0.5–1.05)
EGFR: 76 ML/MIN/1.73M2
GLUCOSE: 85 MG/DL (ref 65–99)
POTASSIUM: 5 MMOL/L (ref 3.5–5.3)
SODIUM: 139 MMOL/L (ref 135–146)

## 2024-03-11 RX ORDER — DICLOFENAC SODIUM 75 MG/1
75 TABLET, DELAYED RELEASE ORAL DAILY
Qty: 90 TABLET | Refills: 0 | Status: SHIPPED | OUTPATIENT
Start: 2024-03-11

## 2024-03-11 NOTE — TELEPHONE ENCOUNTER
PT NEEDS REFILL ON diclofenac 75 MG Oral Tab EC.     [facility] MAIL SERVICE.

## 2024-03-11 NOTE — TELEPHONE ENCOUNTER
Request for refill on diclofenac    LOV  5-2-23    LAST LAB  1-16-24    LAST RX 11-26-23  #[id number]   Kiet Greene MD    Next OV   No future appointments.      PROTOCOL  Non-Narcotic Pain Medication Protocol [id number]/11/2024 11:35 AM   Protocol Details In person appointment or virtual visit in the past 6 mos or appointment in next 3 mos       Patient notified due for appointment and states will call back to schedule.

## 2024-05-06 ENCOUNTER — OFFICE VISIT (OUTPATIENT)
Dept: FAMILY MEDICINE CLINIC | Facility: CLINIC | Age: 64
End: 2024-05-06
Payer: MEDICARE

## 2024-05-06 VITALS
WEIGHT: 120 LBS | BODY MASS INDEX: 21.53 KG/M2 | HEART RATE: 88 BPM | RESPIRATION RATE: 12 BRPM | HEIGHT: 62.75 IN | DIASTOLIC BLOOD PRESSURE: 70 MMHG | TEMPERATURE: 99 F | SYSTOLIC BLOOD PRESSURE: 122 MMHG

## 2024-05-06 DIAGNOSIS — Z12.4 SCREENING FOR CERVICAL CANCER: Primary | ICD-10-CM

## 2024-05-06 PROCEDURE — 88175 CYTOPATH C/V AUTO FLUID REDO: CPT

## 2024-05-06 NOTE — PROGRESS NOTES
HPI:   Criss Murphy is a 64 year old female who presents for a pap only.  She will be having her medicare annual wellness with pcp tomorrow. Symptoms: denies discharge, itching, burning or dysuria.       Immunization History   Administered Date(s) Administered    Covid-19 Vaccine Benoit (J&J) 0.5ml 03/12/2021, 12/09/2021    FLU VAC High Dose 65 YRS & Older PRSV Free (17718) 12/05/2023    FLULAVAL 6 months & older 0.5 ml Prefilled syringe (81194) 10/03/2020, 10/05/2021, 10/03/2022    FLUZONE 6 months and older PFS 0.5 ml (55315) 11/05/2018, 10/03/2020    Fluarix 6 Months And Older 0.5 ml prefilled syringe (45468) 11/05/2018    Flucelvax 0.5 Ml Quad PFS Single Dose 11/09/2019    Flucelvax 0.5ml Vaccine 01/09/2018    Influenza 10/26/2015    Pneumococcal (Prevnar 13) 02/05/2018    Pneumococcal Conjugate PCV20 10/03/2022    TDAP 08/14/2020    Zoster Vaccine Recombinant Adjuvanted (Shingrix) 04/18/2018, 07/13/2018     Wt Readings from Last 6 Encounters:   05/06/24 120 lb (54.4 kg)   05/02/23 112 lb 8 oz (51 kg)   04/11/23 111 lb (50.3 kg)   03/15/23 114 lb (51.7 kg)   02/24/23 113 lb (51.3 kg)   10/03/22 114 lb 4 oz (51.8 kg)     Body mass index is 21.43 kg/m².     Lab Results   Component Value Date    GLU 85 01/16/2024    GLU 80 10/17/2023    GLU 86 07/07/2023     Lab Results   Component Value Date    CHOLEST 213 (H) 09/24/2021    CHOLEST 212 (H) 08/18/2020    CHOLEST 229 (H) 04/24/2019     Lab Results   Component Value Date     09/24/2021    HDL 81 08/18/2020     (H) 04/24/2019     Lab Results   Component Value Date    LDL 95 09/24/2021     (H) 08/18/2020     (H) 04/24/2019     Lab Results   Component Value Date    AST 29 03/01/2023    AST 31 02/17/2023    AST 21 09/24/2021     Lab Results   Component Value Date    ALT 37 03/01/2023    ALT 32 (H) 02/17/2023    ALT 17 09/24/2021       Current Outpatient Medications   Medication Sig Dispense Refill    diclofenac 75 MG Oral Tab EC  Take 1 tablet (75 mg total) by mouth daily. 90 tablet 0    nystatin 255317 units Oral Tab Take 1 tablet (500,000 Units total) by mouth daily. 90 tablet 3    buPROPion  MG Oral Tablet 12 Hr Take 1 tablet (150 mg total) by mouth daily. 90 tablet 1    albuterol (PROAIR HFA) 108 (90 Base) MCG/ACT Inhalation Aero Soln Inhale 2 puffs into the lungs every 6 (six) hours as needed for Wheezing. 1 each 5    albuterol (2.5 MG/3ML) 0.083% Inhalation Nebu Soln Take 3 mL (2.5 mg total) by nebulization 4 (four) times daily. 75 mL 2    Estradiol-Norethindrone Acet (COMBIPATCH) 0.05-0.14 MG/DAY Transdermal Patch Biweekly Place 1 patch onto the skin twice a week. (Patient taking differently: Place 1 patch onto the skin once a week.) 24 patch 3    Azelastine-Fluticasone 137-50 MCG/ACT Nasal Suspension 1 spray by Nasal route 2 (two) times daily.      budesonide 0.5 MG/2ML Inhalation Suspension Take 2 mL (0.5 mg total) by nebulization 2 (two) times daily. 30 ampule 0      Past Medical History:    Asthma (HCC)    COVID    No hospitalization. Lost taste /smell, HA    Osteoarthritis      Past Surgical History:   Procedure Laterality Date    Adenoidectomy      Colonoscopy      Other surgical history Left     knee sx for torn cartilage    Other surgical history Right     Hammer toe sx    Other surgical history      Nasal sx    Other surgical history      dental implants    Other surgical history      bladder sling      Family History   Problem Relation Age of Onset    Diabetes Father     Heart Disorder Father     Colon Cancer Mother     Cancer Sister       Social History:   Social History     Socioeconomic History    Marital status:    Occupational History    Occupation:    Tobacco Use    Smoking status: Never    Smokeless tobacco: Never   Vaping Use    Vaping status: Never Used   Substance and Sexual Activity    Alcohol use: Never    Drug use: Never     Social Determinants of Health      Received from [facility], [facility]    Social Connections    Received from [facility], [facility]    Housing Stability        REVIEW OF SYSTEMS:   GENERAL: feels well otherwise  SKIN: denies any unusual skin lesions  EYES:denies blurred vision or double vision  HEENT: denies nasal congestion, sinus pain or ST  LUNGS: denies shortness of breath with exertion  CARDIOVASCULAR: denies chest pain on exertion  GI: denies abdominal pain,denies heartburn  : denies dysuria, vaginal discharge or itching,  MUSCULOSKELETAL: denies back pain  NEURO: denies headaches  PSYCHE: denies depression or anxiety  HEMATOLOGIC: denies hx of anemia  ENDOCRINE: denies thyroid history  ALL/ASTHMA: denies hx of allergy or asthma    EXAM:   /70 (BP Location: Left arm, Patient Position: Sitting, Cuff Size: adult)   Pulse 88   Temp 99.1 °F (37.3 °C) (Temporal)   Resp 12   Ht 5' 2.75\" (1.594 m)   Wt 120 lb (54.4 kg)   LMP 05/11/2014 (Exact Date)   BMI 21.43 kg/m²   Body mass index is 21.43 kg/m².   GENERAL: well developed, well nourished,in no apparent distress  SKIN: no rashes,no suspicious lesions  :introitus is normal,scant discharge,cervix is pink,no adnexal masses or tenderness, PAP was done     ASSESSMENT AND PLAN:   Criss Murphy is a 64 year old female who presents for a Pap only.      1. Screening for cervical cancer  Pap done and patient will be contacted with results.   - ThinPrep PAP with HPV Reflex Request; Future  - ThinPrep PAP with HPV Reflex Request      CHAMP Villafuerte

## 2024-05-07 ENCOUNTER — OFFICE VISIT (OUTPATIENT)
Dept: FAMILY MEDICINE CLINIC | Facility: CLINIC | Age: 64
End: 2024-05-07
Payer: MEDICARE

## 2024-05-07 VITALS
RESPIRATION RATE: 12 BRPM | WEIGHT: 120 LBS | DIASTOLIC BLOOD PRESSURE: 72 MMHG | OXYGEN SATURATION: 98 % | HEART RATE: 62 BPM | HEIGHT: 62.75 IN | SYSTOLIC BLOOD PRESSURE: 130 MMHG | TEMPERATURE: 98 F | BODY MASS INDEX: 21.53 KG/M2

## 2024-05-07 DIAGNOSIS — Z12.31 VISIT FOR SCREENING MAMMOGRAM: ICD-10-CM

## 2024-05-07 DIAGNOSIS — Z00.00 ENCOUNTER FOR MEDICARE ANNUAL WELLNESS EXAM: Primary | ICD-10-CM

## 2024-05-07 DIAGNOSIS — Z00.00 LABORATORY EXAMINATION ORDERED AS PART OF A ROUTINE GENERAL MEDICAL EXAMINATION: ICD-10-CM

## 2024-05-07 DIAGNOSIS — Z13.29 SCREENING FOR THYROID DISORDER: ICD-10-CM

## 2024-05-07 DIAGNOSIS — Z13.1 SCREENING FOR DIABETES MELLITUS: ICD-10-CM

## 2024-05-07 DIAGNOSIS — F41.9 ANXIETY: ICD-10-CM

## 2024-05-07 DIAGNOSIS — Z13.220 SCREENING, LIPID: ICD-10-CM

## 2024-05-07 PROBLEM — B37.9: Status: RESOLVED | Noted: 2019-04-24 | Resolved: 2024-05-07

## 2024-05-07 PROCEDURE — G0438 PPPS, INITIAL VISIT: HCPCS | Performed by: FAMILY MEDICINE

## 2024-05-07 NOTE — PROGRESS NOTES
Subjective:   Criss Murphy is a 64 year old female who presents for a Medicare Subsequent Annual Wellness visit (Pt already had Initial Annual Wellness) and good health with no chronic problems  .   Feels well no falls  No known injury  Still works    No complains of  issues      History/Other:   Fall Risk Assessment:   She has been screened for Falls and is low risk.      Cognitive Assessment:   She had a completely normal cognitive assessment - see flowsheet entries     Functional Ability/Status:   Criss Murphy has a completely normal functional assessment. See flowsheet for details.        Depression Screening (PHQ-2/PHQ-9): PHQ-2 SCORE: 0  , done 5/6/2024             Advanced Directives:   She does NOT have a Living Will. [Do you have a living will?: No]  She does NOT have a Power of  for Health Care. [Do you have a healthcare power of ?: No]  Discussed Advance Care Planning with patient (and family/surrogate if present). Standard forms made available to patient in After Visit Summary.      Patient Active Problem List   Diagnosis    RAD (reactive airway disease) (Formerly [facility])    Allergic rhinitis     Allergies:  She is allergic to peanuts, tree nuts, seasonal, cat hair extract, and pcn [penicillins].    Current Medications:  No outpatient medications have been marked as taking for the 5/7/24 encounter (Office Visit) with Neftaly Rangel MD.       Medical History:  She  has a past medical history of Asthma ([facility]), COVID (12/19/2020), and Osteoarthritis.  Surgical History:  She  has a past surgical history that includes adenoidectomy; colonoscopy; other surgical history (Left); other surgical history (Right); other surgical history; other surgical history; and other surgical history.   Family History:  Her family history includes Cancer in her sister; Colon Cancer in her mother; Diabetes in her father; Heart Disorder in her father.  Social History:  She  reports that she has never  smoked. She has never used smokeless tobacco. She reports that she does not drink alcohol and does not use drugs.    Tobacco:  She has never smoked tobacco.    CAGE Alcohol Screen:   CAGE screening score of 0 on 5/3/2024, showing low risk of alcohol abuse.      Patient Care Team:  Neftaly Rangel MD as PCP - General (Family Medicine)  Miles Madera, PT as Physical Therapist  Shanell Castaneda PT as Physical Therapist (Physical Therapy)    Review of Systems       Objective:   Physical Exam       /72 (BP Location: Left arm, Patient Position: Sitting, Cuff Size: adult)   Pulse 62   Temp 98.2 °F (36.8 °C) (Temporal)   Resp 12   Ht 5' 2.75\" (1.594 m)   Wt 120 lb (54.4 kg)   LMP 05/11/2014 (Exact Date)   SpO2 98%   BMI 21.43 kg/m²  Estimated body mass index is 21.43 kg/m² as calculated from the following:    Height as of this encounter: 5' 2.75\" (1.594 m).    Weight as of this encounter: 120 lb (54.4 kg).    Medicare Hearing Assessment:   Hearing Screening    Time taken: 5/7/2024  9:15 AM  Entry User: Godfrey Pantoja MA  Screening Method: Whisper Test  Whisper Test Result: Pass         Visual Acuity:   Right Eye Visual Acuity: Corrected Right Eye Chart Acuity: 20/25   Left Eye Visual Acuity: Corrected Left Eye Chart Acuity: 20/25   Both Eyes Visual Acuity: Corrected Both Eyes Chart Acuity: 20/25   Able To Tolerate Visual Acuity: Yes        Assessment & Plan:   Criss Murphy is a 64 year old female who presents for a Medicare Assessment.     1. Encounter for Medicare annual wellness exam (Primary)  2. Visit for screening mammogram  -     3D Mammogram Digital Screen, Bilateral (CPT=77067/56591); Future; Expected date: 05/07/2024  3. Laboratory examination ordered as part of a routine general medical examination  -     Lipid Panel  -     Hemoglobin A1C  -     Comp Metabolic Panel (14)  -     TSH and Free T4  4. Screening for diabetes mellitus  -     Hemoglobin A1C  -     Comp Metabolic Panel  (14)  5. Screening, lipid  -     Lipid Panel  6. Screening for thyroid disorder  -     TSH and Free T4  7. Anxiety  Comments:  stable on buproprion    The patient indicates understanding of these issues and agrees to the plan.  Continue with current treatment plan.  Imaging studies ordered.  Lab work ordered.  Reinforced healthy diet, lifestyle, and exercise.      No follow-ups on file.     Neftaly Rangel MD, 5/7/2024     Supplementary Documentation:   General Health:  In the past six months, have you lost more than 10 pounds without trying?: 2 - No  Has your appetite been poor?: No  Type of Diet: Balanced  How does the patient maintain a good energy level?: Appropriate Exercise;Daily Walks;Stretching  How would you describe your daily physical activity?: Moderate  How would you describe your current health state?: Good  How do you maintain positive mental well-being?: Social Interaction;Puzzles;Games;Visiting Friends;Visiting Family  On a scale of 0 to 10, with 0 being no pain and 10 being severe pain, what is your pain level?: 2 - (Mild)  In the past six months, have you experienced urine leakage?: 0-No  At any time do you feel concerned for the safety/well-being of yourself and/or your children, in your home or elsewhere?: No  Have you had any immunizations at another office such as Influenza, Hepatitis B, Tetanus, or Pneumococcal?: No         Criss Murphy's SCREENING SCHEDULE   Tests on this list are recommended by your physician but may not be covered, or covered at this frequency, by your insurer.   Please check with your insurance carrier before scheduling to verify coverage.   PREVENTATIVE SERVICES FREQUENCY &  COVERAGE DETAILS LAST COMPLETION DATE   Diabetes Screening    Fasting Blood Sugar /  Glucose    One screening every 12 months if never tested or if previously tested but not diagnosed with pre-diabetes   One screening every 6 months if diagnosed with pre-diabetes Lab Results   Component  Value Date    GLU 85 01/16/2024        Cardiovascular Disease Screening    Lipid Panel  Cholesterol  Lipoprotein (HDL)  Triglycerides Covered every 5 years for all Medicare beneficiaries without apparent signs or symptoms of cardiovascular disease Lab Results   Component Value Date    CHOLEST 213 (H) 09/24/2021     09/24/2021    LDL 95 09/24/2021    TRIG 65 09/24/2021         Electrocardiogram (EKG)   Covered if needed at Welcome to Medicare, and non-screening if indicated for medical reasons 02/27/2023      Ultrasound Screening for Abdominal Aortic Aneurysm (AAA) Covered once in a lifetime for one of the following risk factors    Men who are 65-75 years old and have ever smoked    Anyone with a family history -     Colorectal Cancer Screening  Covered for ages 50-85; only need ONE of the following:    Colonoscopy   Covered every 10 years    Covered every 2 years if patient is at high risk or previous colonoscopy was abnormal 01/19/2023    Health Maintenance   Topic Date Due    Colorectal Cancer Screening  01/19/2026       Flexible Sigmoidoscopy   Covered every 4 years -    Fecal Occult Blood Test Covered annually -   Bone Density Screening    Bone density screening    Covered every 2 years after age 65 if diagnosed with risk of osteoporosis or estrogen deficiency.    Covered yearly for long-term glucocorticoid medication use (Steroids) No results found for this or any previous visit.      No recommendations at this time   Pap and Pelvic    Pap   Covered every 2 years for women at normal risk; Annually if at high risk 05/06/2024  Health Maintenance   Topic Date Due    Pap Smear  04/11/2024       Chlamydia Annually if high risk -  No recommendations at this time   Screening Mammogram    Mammogram     Recommend annually for all female patients aged 40 and older    One baseline mammogram covered for patients aged 35-39 05/26/2023    Health Maintenance   Topic Date Due    Mammogram  04/28/2024       Immunizations     Influenza Covered once per flu season  Please get every year 12/05/2023  No recommendations at this time    Pneumococcal Each vaccine (Syzkuml13 & Luoxtshbi39) covered once after 65 Prevnar 13: 02/05/2018    [id number]: -     No recommendations at this time    Hepatitis B One screening covered for patients with certain risk factors   -  No recommendations at this time    Tetanus Toxoid Not covered by Medicare Part B unless medically necessary (cut with metal); may be covered with your pharmacy prescription benefits -    Tetanus, Diptheria and Pertusis TD and TDaP Not covered by Medicare Part B -  No recommendations at this time    Zoster Not covered by Medicare Part B; may be covered with your pharmacy  prescription benefits -  No recommendations at this time

## 2024-05-07 NOTE — PATIENT INSTRUCTIONS
Criss Murphy's SCREENING SCHEDULE   Tests on this list are recommended by your physician but may not be covered, or covered at this frequency, by your insurer.   Please check with your insurance carrier before scheduling to verify coverage.   PREVENTATIVE SERVICES FREQUENCY &  COVERAGE DETAILS LAST COMPLETION DATE   Diabetes Screening    Fasting Blood Sugar /  Glucose    One screening every 12 months if never tested or if previously tested but not diagnosed with pre-diabetes   One screening every 6 months if diagnosed with pre-diabetes Lab Results   Component Value Date    GLU 85 01/16/2024        Cardiovascular Disease Screening    Lipid Panel  Cholesterol  Lipoprotein (HDL)  Triglycerides Covered every 5 years for all Medicare beneficiaries without apparent signs or symptoms of cardiovascular disease Lab Results   Component Value Date    CHOLEST 213 (H) 09/24/2021     09/24/2021    LDL 95 09/24/2021    TRIG 65 09/24/2021         Electrocardiogram (EKG)   Covered if needed at Welcome to Medicare, and non-screening if indicated for medical reasons 02/27/2023      Ultrasound Screening for Abdominal Aortic Aneurysm (AAA) Covered once in a lifetime for one of the following risk factors   • Men who are 65-75 years old and have ever smoked   • Anyone with a family history -     Colorectal Cancer Screening  Covered for ages 50-85; only need ONE of the following:    Colonoscopy   Covered every 10 years    Covered every 2 years if patient is at high risk or previous colonoscopy was abnormal 01/19/2023    Health Maintenance   Topic Date Due   • Colorectal Cancer Screening  01/19/2026       Flexible Sigmoidoscopy   Covered every 4 years -    Fecal Occult Blood Test Covered annually -   Bone Density Screening    Bone density screening    Covered every 2 years after age 65 if diagnosed with risk of osteoporosis or estrogen deficiency.    Covered yearly for long-term glucocorticoid medication use (Steroids)  No results found for this or any previous visit.      No recommendations at this time   Pap and Pelvic    Pap   Covered every 2 years for women at normal risk; Annually if at high risk 05/06/2024  Health Maintenance   Topic Date Due   • Pap Smear  04/11/2024       Chlamydia Annually if high risk -  No recommendations at this time   Screening Mammogram    Mammogram     Recommend annually for all female patients aged 40 and older    One baseline mammogram covered for patients aged 35-39 05/26/2023    Health Maintenance   Topic Date Due   • Mammogram  04/28/2024       Immunizations    Influenza Covered once per flu season  Please get every year 12/05/2023  No recommendations at this time    Pneumococcal Each vaccine (Tvzymlx17 & Nxxtdsgza54) covered once after 65 Prevnar 13: 02/05/2018    Aolrnigaq50: -     No recommendations at this time    Hepatitis B One screening covered for patients with certain risk factors   -  No recommendations at this time    Tetanus Toxoid Not covered by Medicare Part B unless medically necessary (cut with metal); may be covered with your pharmacy prescription benefits -    Tetanus, Diptheria and Pertusis TD and TDaP Not covered by Medicare Part B -  No recommendations at this time    Zoster Not covered by Medicare Part B; may be covered with your pharmacy  prescription benefits -  No recommendations at this time

## 2024-05-11 LAB
.: NORMAL
.: NORMAL

## 2024-05-14 DIAGNOSIS — Z79.1 ENCOUNTER FOR LONG-TERM (CURRENT) USE OF NON-STEROIDAL ANTI-INFLAMMATORIES: ICD-10-CM

## 2024-05-14 DIAGNOSIS — Z13.1 SCREENING FOR DIABETES MELLITUS: ICD-10-CM

## 2024-05-14 DIAGNOSIS — Z13.29 SCREENING FOR THYROID DISORDER: ICD-10-CM

## 2024-05-14 DIAGNOSIS — Z13.220 SCREENING, LIPID: ICD-10-CM

## 2024-05-14 DIAGNOSIS — Z00.00 ENCOUNTER FOR MEDICARE ANNUAL WELLNESS EXAM: Primary | ICD-10-CM

## 2024-05-14 LAB
ALBUMIN/GLOBULIN RATIO: 1.5 (CALC) (ref 1–2.5)
ALBUMIN: 4.3 G/DL (ref 3.6–5.1)
ALKALINE PHOSPHATASE: 59 U/L (ref 37–153)
ALT: 15 U/L (ref 6–29)
AST: 23 U/L (ref 10–35)
BILIRUBIN, TOTAL: 0.5 MG/DL (ref 0.2–1.2)
BUN: 13 MG/DL (ref 7–25)
CALCIUM: 10.1 MG/DL (ref 8.6–10.4)
CARBON DIOXIDE: 29 MMOL/L (ref 20–32)
CHLORIDE: 102 MMOL/L (ref 98–110)
CHOL/HDLC RATIO: 2.6 (CALC)
CHOLESTEROL, TOTAL: 221 MG/DL
CREATININE: 0.85 MG/DL (ref 0.5–1.05)
EGFR: 76 ML/MIN/1.73M2
GLOBULIN: 2.8 G/DL (CALC) (ref 1.9–3.7)
GLUCOSE: 88 MG/DL (ref 65–99)
HDL CHOLESTEROL: 85 MG/DL
HEMOGLOBIN A1C: 5.4 % OF TOTAL HGB
LDL-CHOLESTEROL: 113 MG/DL (CALC)
NON-HDL CHOLESTEROL: 136 MG/DL (CALC)
POTASSIUM: 5.5 MMOL/L (ref 3.5–5.3)
PROTEIN, TOTAL: 7.1 G/DL (ref 6.1–8.1)
SODIUM: 139 MMOL/L (ref 135–146)
T4, FREE: 1 NG/DL (ref 0.8–1.8)
TRIGLYCERIDES: 115 MG/DL
TSH: 1.51 MIU/L (ref 0.4–4.5)

## 2024-06-01 DIAGNOSIS — F41.9 ANXIETY: ICD-10-CM

## 2024-06-03 ENCOUNTER — PATIENT MESSAGE (OUTPATIENT)
Dept: FAMILY MEDICINE CLINIC | Facility: CLINIC | Age: 64
End: 2024-06-03

## 2024-06-03 RX ORDER — BUPROPION HYDROCHLORIDE 150 MG/1
150 TABLET, EXTENDED RELEASE ORAL DAILY
Qty: 90 TABLET | Refills: 1 | OUTPATIENT
Start: 2024-06-03

## 2024-06-03 NOTE — TELEPHONE ENCOUNTER
From: Criss Murphy  To: Neftaly Rangel  Sent: 6/3/2024 9:31 AM CDT  Subject: Px refill     Good morning I’ve received a message that my px for Bupropion needs approval for refill. Let me know when this is done, no gaines . Thank you Ines Murphy

## 2024-06-05 NOTE — TELEPHONE ENCOUNTER
Spoke with patient - our records indicate she is not due for a refill until July. She will contact us when she is ready for additional refills. Verbalized understanding.

## 2024-06-11 ENCOUNTER — PATIENT MESSAGE (OUTPATIENT)
Dept: FAMILY MEDICINE CLINIC | Facility: CLINIC | Age: 64
End: 2024-06-11

## 2024-06-11 DIAGNOSIS — L85.3 DRY SKIN DERMATITIS: Primary | ICD-10-CM

## 2024-06-13 NOTE — TELEPHONE ENCOUNTER
From: Criss Murphy  To: Michelle Ch  Sent: 6/11/2024 12:27 PM CDT  Subject: Estrogen cream    Nick Martinez, I was wondering what your thoughts are on prescribing estrogen cream for me. 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 Mainly for muscle tone and crape skin. I’m in [place] now, no hurry. Not sure if my latest blood work checked my progesterone and estrogen levels. Take care

## 2024-06-15 ENCOUNTER — MED REC SCAN ONLY (OUTPATIENT)
Dept: FAMILY MEDICINE CLINIC | Facility: CLINIC | Age: 64
End: 2024-06-15

## 2024-06-15 ENCOUNTER — TELEPHONE (OUTPATIENT)
Dept: FAMILY MEDICINE CLINIC | Facility: CLINIC | Age: 64
End: 2024-06-15

## 2024-06-15 NOTE — TELEPHONE ENCOUNTER
Received fax from [facility] with mammogram results exam date of 6-14-24    Dr Rangel carefully reviewed and documented:  6 month follow up ultrasound Right breast    Epic care gap updated    Patient notified of provider comments regarding results and verbalized understanding    Reminder set up in Epic.  Copy to scan.

## 2024-06-17 NOTE — TELEPHONE ENCOUNTER
Royal Kahn.    Usually estrogen cream is used for vaginal dryness/atrophy.  Is this what you are wanting to use it for?  It looks like your labs did not include progesterone or estrogen.  Is there something you are wanting this checked for?  Please let me know.

## 2024-06-20 NOTE — TELEPHONE ENCOUNTER
I am not familiar with estrogens being used for a moisturizer.  I checked with a few of the other providers and they are not as well but I did research it after your message and saw this is something done.  I would recommend trying one over the counter for now and I will put in a referral for dermatology for you.  They will be able to help you further with this since they are more knowledgeable in the topic.

## 2024-06-23 DIAGNOSIS — F41.9 ANXIETY: ICD-10-CM

## 2024-06-24 RX ORDER — BUPROPION HYDROCHLORIDE 150 MG/1
150 TABLET, EXTENDED RELEASE ORAL DAILY
Qty: 90 TABLET | Refills: 0 | Status: SHIPPED | OUTPATIENT
Start: 2024-06-24

## 2024-06-26 ENCOUNTER — OFFICE VISIT (OUTPATIENT)
Dept: FAMILY MEDICINE CLINIC | Facility: CLINIC | Age: 64
End: 2024-06-26

## 2024-06-26 VITALS
RESPIRATION RATE: 16 BRPM | TEMPERATURE: 98 F | BODY MASS INDEX: 21 KG/M2 | HEART RATE: 80 BPM | DIASTOLIC BLOOD PRESSURE: 80 MMHG | SYSTOLIC BLOOD PRESSURE: 124 MMHG | WEIGHT: 120 LBS | OXYGEN SATURATION: 98 %

## 2024-06-26 DIAGNOSIS — H93.8X2 EAR PRESSURE, LEFT: ICD-10-CM

## 2024-06-26 DIAGNOSIS — H72.91 PERFORATION OF RIGHT TYMPANIC MEMBRANE: Primary | ICD-10-CM

## 2024-06-26 PROCEDURE — 99213 OFFICE O/P EST LOW 20 MIN: CPT

## 2024-06-26 RX ORDER — AZITHROMYCIN 250 MG/1
TABLET, FILM COATED ORAL
Qty: 6 TABLET | Refills: 0 | Status: SHIPPED | OUTPATIENT
Start: 2024-06-26 | End: 2024-06-30

## 2024-06-26 NOTE — PROGRESS NOTES
Criss Murphy is a 64 year old female.  HPI:     Patient in office for bilateral ear pain and pressure that started 2 days ago.  She has bloody drainage for her right ear.  She is a .  She took a decongestant with minimal relief.     Current Outpatient Medications   Medication Sig Dispense Refill    BUPROPION  MG Oral Tablet 12 Hr TAKE 1 TABLET DAILY 90 tablet 0    diclofenac 75 MG Oral Tab EC Take 1 tablet (75 mg total) by mouth daily. 90 tablet 0    nystatin 752785 units Oral Tab Take 1 tablet (500,000 Units total) by mouth daily. 90 tablet 3    albuterol (PROAIR HFA) 108 (90 Base) MCG/ACT Inhalation Aero Soln Inhale 2 puffs into the lungs every 6 (six) hours as needed for Wheezing. 1 each 5    albuterol (2.5 MG/3ML) 0.083% Inhalation Nebu Soln Take 3 mL (2.5 mg total) by nebulization 4 (four) times daily. 75 mL 2    Estradiol-Norethindrone Acet (COMBIPATCH) 0.05-0.14 MG/DAY Transdermal Patch Biweekly Place 1 patch onto the skin twice a week. (Patient taking differently: Place 1 patch onto the skin once a week.) 24 patch 3    Azelastine-Fluticasone 137-50 MCG/ACT Nasal Suspension 1 spray by Nasal route 2 (two) times daily.      budesonide 0.5 MG/2ML Inhalation Suspension Take 2 mL (0.5 mg total) by nebulization 2 (two) times daily. 30 ampule 0      Past Medical History:    Asthma (HCC)    COVID    No hospitalization. Lost taste /smell, HA    Osteoarthritis      Social History:  Social History     Socioeconomic History    Marital status:    Occupational History    Occupation:    Tobacco Use    Smoking status: Never    Smokeless tobacco: Never   Vaping Use    Vaping status: Never Used   Substance and Sexual Activity    Alcohol use: Never    Drug use: Never     Social Determinants of Health      Received from [facility], [facility]    Social Connections    Received from [facility], [facility]  [facility]    Housing Stability          REVIEW OF SYSTEMS:     Review of Systems   Constitutional:  Negative for activity change, appetite change and fatigue.   HENT:  Positive for ear discharge and ear pain. Negative for congestion, hearing loss and sore throat.    Eyes:  Negative for discharge and redness.   Respiratory:  Negative for shortness of breath and wheezing.    Cardiovascular:  Negative for chest pain.   Gastrointestinal:  Negative for abdominal distention and abdominal pain.   Endocrine: Negative for cold intolerance and heat intolerance.   Genitourinary:  Negative for difficulty urinating and urgency.   Musculoskeletal:  Negative for arthralgias and back pain.   Skin:  Negative for color change.   Allergic/Immunologic: Negative for environmental allergies.   Neurological:  Negative for dizziness, syncope and headaches.   Hematological:  Negative for adenopathy. Does not bruise/bleed easily.   Psychiatric/Behavioral:  Negative for agitation, behavioral problems and confusion.        EXAM:   /80 (BP Location: Left arm, Patient Position: Sitting, Cuff Size: adult)   Pulse 80   Temp 98.4 °F (36.9 °C) (Temporal)   Resp 16   Wt 120 lb (54.4 kg)   LMP 05/11/2014 (Exact Date)   SpO2 98%   BMI 21.43 kg/m²     Physical Exam  Constitutional:       Appearance: Normal appearance. She is normal weight.   HENT:      Head: Normocephalic and atraumatic.      Right Ear: Drainage (Blood in ear canal.  Unable to view TM.) present.      Left Ear: Tympanic membrane is retracted.      Nose: Nose normal.      Mouth/Throat:      Mouth: Mucous membranes are moist.      Pharynx: Oropharynx is clear.   Eyes:      Extraocular Movements: Extraocular movements intact.      Conjunctiva/sclera: Conjunctivae normal.      Pupils: Pupils are equal, round, and reactive to light.   Cardiovascular:      Rate and Rhythm: Normal rate and regular rhythm.      Pulses: Normal pulses.      Heart sounds: Normal heart sounds.    Pulmonary:      Effort: Pulmonary effort is normal.      Breath sounds: Normal breath sounds.   Abdominal:      Palpations: Abdomen is soft.   Musculoskeletal:         General: Normal range of motion.      Cervical back: Normal range of motion.   Skin:     General: Skin is warm and dry.      Capillary Refill: Capillary refill takes less than 2 seconds.   Neurological:      Mental Status: She is alert and oriented to person, place, and time.   Psychiatric:         Mood and Affect: Mood normal.         Behavior: Behavior normal.          ASSESSMENT AND PLAN:     1. Perforation of right tympanic membrane  Azithromycin sent to pharmacy and instructions provided.  Recommended using afrin on accent and descent of plane to help with pain and pressure.  Plan of care reviewed with Dr. Rocio Hodgson.  - azithromycin (ZITHROMAX Z-SHA) 250 MG Oral Tab; Take 2 tablets (500 mg total) by mouth daily for 1 day, THEN 1 tablet (250 mg total) daily for 4 days.  Dispense: 6 tablet; Refill: 0    2. Ear pressure, left  Azithromycin sent to pharmacy and instructions provided.  Recommended using afrin on accent and descent of plane to help with pain and pressure.  - azithromycin (ZITHROMAX Z-SHA) 250 MG Oral Tab; Take 2 tablets (500 mg total) by mouth daily for 1 day, THEN 1 tablet (250 mg total) daily for 4 days.  Dispense: 6 tablet; Refill: 0    The patient indicates understanding of these issues and agrees to the plan.  The patient is asked to return in 2 weeks to reevaluate ear and sooner if needed.    Michelle Ch, CHAMP  6/26/2024

## 2024-07-10 ENCOUNTER — OFFICE VISIT (OUTPATIENT)
Dept: FAMILY MEDICINE CLINIC | Facility: CLINIC | Age: 64
End: 2024-07-10
Payer: MEDICARE

## 2024-07-10 VITALS
RESPIRATION RATE: 20 BRPM | DIASTOLIC BLOOD PRESSURE: 75 MMHG | OXYGEN SATURATION: 97 % | BODY MASS INDEX: 21.53 KG/M2 | HEIGHT: 62.75 IN | WEIGHT: 120 LBS | HEART RATE: 75 BPM | TEMPERATURE: 98 F | SYSTOLIC BLOOD PRESSURE: 137 MMHG

## 2024-07-10 DIAGNOSIS — H61.21 RIGHT EAR IMPACTED CERUMEN: Primary | ICD-10-CM

## 2024-07-10 PROCEDURE — 69209 REMOVE IMPACTED EAR WAX UNI: CPT

## 2024-07-10 PROCEDURE — 99214 OFFICE O/P EST MOD 30 MIN: CPT

## 2024-07-10 NOTE — PROGRESS NOTES
Criss Murphy is a 64 year old female.  HPI:     Patient in office to follow up on her ear.  She is a  and felt her ear may have ruptured 2 weeks ago and was seen in office at that time.  Her right ear was full of bloody dry discharge and was unable to visualized typanic membrane.  Patient started on antibiotic at that time.  She reports both ears feel tender today and she feels off balance.  Denies pain to ears but said she feels a \"twinge\" every now and then.      Current Outpatient Medications   Medication Sig Dispense Refill    BUPROPION  MG Oral Tablet 12 Hr TAKE 1 TABLET DAILY 90 tablet 0    diclofenac 75 MG Oral Tab EC Take 1 tablet (75 mg total) by mouth daily. 90 tablet 0    nystatin [id number] units Oral Tab Take 1 tablet (500,000 Units total) by mouth daily. 90 tablet 3    albuterol (PROAIR HFA) 108 (90 Base) MCG/ACT Inhalation Aero Soln Inhale 2 puffs into the lungs every 6 (six) hours as needed for Wheezing. 1 each 5    albuterol (2.5 MG/3ML) 0.083% Inhalation Nebu Soln Take 3 mL (2.5 mg total) by nebulization 4 (four) times daily. 75 mL 2    Estradiol-Norethindrone Acet (COMBIPATCH) 0.05-0.14 MG/DAY Transdermal Patch Biweekly Place 1 patch onto the skin twice a week. (Patient taking differently: Place 1 patch onto the skin once a week.) 24 patch 3    Azelastine-Fluticasone 137-50 MCG/ACT Nasal Suspension 1 spray by Nasal route 2 (two) times daily.      budesonide 0.5 MG/2ML Inhalation Suspension Take 2 mL (0.5 mg total) by nebulization 2 (two) times daily. 30 ampule 0      Past Medical History:    Asthma (HCC)    COVID    No hospitalization. Lost taste /smell, HA    Osteoarthritis      Social History:  Social History     Socioeconomic History    Marital status:    Occupational History    Occupation:    Tobacco Use    Smoking status: Never    Smokeless tobacco: Never   Vaping Use    Vaping status: Never Used   Substance and Sexual Activity    Alcohol  use: Never    Drug use: Never     Social Determinants of Health      Received from [facility], [facility]    Social Connections    Received from [facility], [facility]    Housing Stability          REVIEW OF SYSTEMS:     Review of Systems   Constitutional:  Negative for activity change, appetite change and fatigue.   HENT:  Positive for ear discharge and ear pain (see hpi). Negative for congestion, hearing loss and sore throat.    Eyes:  Negative for discharge and redness.   Respiratory:  Negative for shortness of breath and wheezing.    Cardiovascular:  Negative for chest pain.   Gastrointestinal:  Negative for abdominal distention and abdominal pain.   Endocrine: Negative for cold intolerance and heat intolerance.   Genitourinary:  Negative for difficulty urinating and urgency.   Musculoskeletal:  Negative for arthralgias and back pain.   Skin:  Negative for color change.   Allergic/Immunologic: Negative for environmental allergies.   Neurological:  Negative for dizziness, syncope and headaches.   Hematological:  Negative for adenopathy. Does not bruise/bleed easily.   Psychiatric/Behavioral:  Negative for agitation, behavioral problems and confusion.        EXAM:   /75   Pulse 75   Temp 97.6 °F (36.4 °C) (Temporal)   Resp 20   Ht 5' 2.75\" (1.594 m)   Wt 120 lb (54.4 kg)   LMP 05/11/2014 (Exact Date)   SpO2 97%   BMI 21.43 kg/m²     Physical Exam  Constitutional:       Appearance: Normal appearance. She is normal weight.   HENT:      Head: Normocephalic and atraumatic.      Right Ear: Tympanic membrane normal. There is impacted cerumen (dried blood and cerumen).      Left Ear: Tympanic membrane normal.      Nose: Nose normal.   Eyes:      Extraocular Movements: Extraocular movements intact.      Conjunctiva/sclera: Conjunctivae normal.      Pupils: Pupils are equal, round, and reactive to light.   Cardiovascular:       Rate and Rhythm: Normal rate and regular rhythm.   Pulmonary:      Effort: Pulmonary effort is normal.      Breath sounds: Normal breath sounds.   Abdominal:      Palpations: Abdomen is soft.   Musculoskeletal:      Cervical back: Normal range of motion.   Skin:     General: Skin is warm and dry.      Capillary Refill: Capillary refill takes less than 2 seconds.   Neurological:      Mental Status: She is alert and oriented to person, place, and time.   Psychiatric:         Mood and Affect: Mood normal.         Behavior: Behavior normal.          ASSESSMENT AND PLAN:     1. Right ear impacted cerumen  Irrigatd ear and   - REMOVAL IMPACTED CERUMEN USING IRRIGATION/LAVAGE, UNILATERAL    The patient indicates understanding of these issues and agrees to the plan.      Michelle Ch, APRN  7/10/2024

## 2024-07-15 RX ORDER — DICLOFENAC SODIUM 75 MG/1
75 TABLET, DELAYED RELEASE ORAL DAILY
Qty: 90 TABLET | Refills: 1 | Status: SHIPPED | OUTPATIENT
Start: 2024-07-15

## 2024-07-22 ENCOUNTER — TELEPHONE (OUTPATIENT)
Dept: FAMILY MEDICINE CLINIC | Facility: CLINIC | Age: 64
End: 2024-07-22

## 2024-08-28 ENCOUNTER — ORDER TRANSCRIPTION (OUTPATIENT)
Dept: PHYSICAL THERAPY | Facility: HOSPITAL | Age: 64
End: 2024-08-28

## 2024-08-28 ENCOUNTER — TELEPHONE (OUTPATIENT)
Dept: PHYSICAL THERAPY | Facility: HOSPITAL | Age: 64
End: 2024-08-28

## 2024-08-28 DIAGNOSIS — M17.12 PRIMARY OSTEOARTHRITIS OF LEFT KNEE: Primary | ICD-10-CM

## 2024-08-29 ENCOUNTER — TELEPHONE (OUTPATIENT)
Dept: PHYSICAL THERAPY | Facility: HOSPITAL | Age: 64
End: 2024-08-29

## 2024-09-03 ENCOUNTER — PATIENT MESSAGE (OUTPATIENT)
Dept: FAMILY MEDICINE CLINIC | Facility: CLINIC | Age: 64
End: 2024-09-03

## 2024-09-03 NOTE — TELEPHONE ENCOUNTER
Call placed to Dr Greene's office and spoke with Fay who will forward message to clinical staff for pre op paper work to be faxed to our office.  
NEED PRE OPERATIVE APPOINTMENT FOR SURGERY 10-16-24 WITH DR IBAN BAL, CALL PATIENT BACK TO MAKE APPOINTMENT   
Pre op paperwork received and appointment scheduled:  Future Appointments   Date Time Provider Department Center   9/25/2024 11:20 AM Neftaly Rangel MD [facility] [place]   10/21/2024 10:15 AM Miles Madera, PT [facility] [place]   10/24/2024 10:15 AM Miles Madera, PT [facility] [place]   10/29/2024 10:15 AM Miles Madera, PT [facility] [place]   10/31/2024 10:15 AM Miles Madera, PT [facility] [place]   11/4/2024  8:00 AM Miles Madera, PT [facility] [place]   11/7/2024  8:00 AM Miles Madera, PT [facility] [place]   11/11/2024  8:00 AM Miles Madera, PT [facility] [place]   11/14/2024  8:00 AM Miles Madera, PT [facility] [place]   11/18/2024  8:00 AM Miles Madera, PT [facility] [place]   11/21/2024  8:00 AM Miles Madera, PT [facility] [place]   11/25/2024  8:00 AM Miles Madera, PT [facility] [place]   12/2/2024  8:00 AM Miles Madera, PT [facility] [place]     See My chart messages also  
Spoke with patient and advised will call her to schedule once pre op paper work is received.  
I will START or STAY ON the medications listed below when I get home from the hospital:    aspirin 325 mg oral tablet  -- 1 tab(s) by mouth once a day  -- Indication: For cad prevention    oxyCODONE 7.5 mg oral tablet  -- 1 tab(s) by mouth every 6 hours, As Needed  -- Indication: For pain    lisinopril  -- Indication: For Hypertension, unspecified type    tamsulosin 0.4 mg oral capsule  -- 1 cap(s) by mouth once a day  -- Indication: For bph    DULoxetine 60 mg oral delayed release capsule  -- 1 cap(s) by mouth once a day  -- Indication: For depression    Metoprolol Succinate ER 25 mg oral tablet, extended release  -- 1 tab(s) by mouth once a day  -- Indication: For Hypertension, unspecified type    cyclobenzaprine  -- Indication: For muscle relaxants    omeprazole 20 mg oral delayed release capsule  -- 1 cap(s) by mouth once a day  -- Indication: For acid reflux

## 2024-09-03 NOTE — TELEPHONE ENCOUNTER
From: Criss Murphy  To: Neftaly Claire  Sent: 9/3/2024 7:03 AM CDT  Subject: Pre op and EKG     Hello   My knee surgery is scheduled for Oct 16th. Wondering if you have a time slot on either Sept 25 or 26 for pre op and EKG appointment? Flying right now if I done get back to you right away. Early appointment preferred. Thank you

## 2024-09-03 NOTE — TELEPHONE ENCOUNTER
Documentation received and appointment scheduled:  Future Appointments   Date Time Provider Department Center   9/25/2024 11:20 AM Neftaly Rangel MD [facility] [place]   10/21/2024 10:15 AM Miles Madera, PT [facility] [place]   10/24/2024 10:15 AM Miles Madera, PT [facility] [place]   10/29/2024 10:15 AM Miles Madera, PT [facility] [place]   10/31/2024 10:15 AM Miles Madera, PT [facility] [place]   11/4/2024  8:00 AM Miles Madera, PT [facility] [place]   11/7/2024  8:00 AM Miles Madera, PT [facility] [place]   11/11/2024  8:00 AM Miles Madera, PT [facility] [place]   11/14/2024  8:00 AM Miles Madera, PT [facility] [place]   11/18/2024  8:00 AM Miles Madera, PT [facility] [place]   11/21/2024  8:00 AM Miles Madera, PT [facility] [place]   11/25/2024  8:00 AM Miles Madera, PT [facility] [place]   12/2/2024  8:00 AM Miles Madera, PT [facility] [place]         My Chart message sent to patient with above information.

## 2024-09-05 RX ORDER — MULTIVIT-MIN/IRON FUM/FOLIC AC 7.5 MG-4
1 TABLET ORAL DAILY
COMMUNITY

## 2024-09-05 RX ORDER — FERROUS SULFATE 325(65) MG
325 TABLET, DELAYED RELEASE (ENTERIC COATED) ORAL DAILY PRN
COMMUNITY

## 2024-09-05 RX ORDER — WHEAT DEXTRIN 3 G/4 G
1 POWDER IN PACKET (EA) ORAL DAILY
COMMUNITY

## 2024-09-20 ENCOUNTER — PATIENT MESSAGE (OUTPATIENT)
Dept: FAMILY MEDICINE CLINIC | Facility: CLINIC | Age: 64
End: 2024-09-20

## 2024-09-20 NOTE — TELEPHONE ENCOUNTER
From: Criss Murphy  To: Neftaly Rangel  Sent: 9/20/2024 1:28 PM CDT  Subject: Pre op appointment     Hello, I’ve asked Dr Greene if I can receive any vaccines at my pre op appointment on Sept 25th. He said it would be fine. If Dr Rangel is ok with it I’m probably due for COVID and flu. Thank you

## 2024-09-21 DIAGNOSIS — F41.9 ANXIETY: ICD-10-CM

## 2024-09-21 RX ORDER — BUPROPION HYDROCHLORIDE 150 MG/1
150 TABLET, EXTENDED RELEASE ORAL DAILY
Qty: 90 TABLET | Refills: 0 | Status: SHIPPED | OUTPATIENT
Start: 2024-09-21

## 2024-09-21 NOTE — TELEPHONE ENCOUNTER
LOV: 7/10/24    LAST LAB:5/13/24    LAST RX:  BUPROPION  MG Oral Tablet 12 Hr 90 tablet 0 6/24/2024 --   Sig:   TAKE 1 TABLET DAILY         Next OV:   Future Appointments   Date Time Provider Department Center   9/25/2024 10:30 AM [facility] [place]   9/25/2024 11:20 AM Neftaly Rangel MD [facility] [place]   10/21/2024 10:15 AM iMles Madera, PT [facility] [place]   10/24/2024 10:15 AM Miles Madera, PT [facility] [place]   10/29/2024 10:15 AM Miles Madera, PT [facility] [place]   10/31/2024 10:15 AM Miles Madera, PT [facility] [place]   11/4/2024  8:00 AM Miles Madera, PT [facility] [place]   11/7/2024  8:00 AM Miles Madera, PT [facility] [place]   11/11/2024  8:00 AM Miles Madera, PT [facility] [place]   11/14/2024  8:00 AM Miles Madera, PT [facility] [place]   11/18/2024  8:00 AM Miles Madera, PT [facility] [place]   11/21/2024  8:00 AM Miles Madera, PT [facility] [place]   11/25/2024  8:00 AM Miles Madera, PT [facility] [place]   12/2/2024  8:00 AM Miles Madera, PT [facility] [place]          PROTOCOL  Psychiatric Non-Scheduled (Anti-Anxiety) [id number]/21/2024 07:09 AM   Protocol Details In person appointment or virtual visit in the past 6 mos or appointment in next 3 mos    Depression Screening completed within the past 12 months

## 2024-09-25 ENCOUNTER — OFFICE VISIT (OUTPATIENT)
Dept: FAMILY MEDICINE CLINIC | Facility: CLINIC | Age: 64
End: 2024-09-25
Payer: MEDICARE

## 2024-09-25 ENCOUNTER — LABORATORY ENCOUNTER (OUTPATIENT)
Dept: LAB | Age: 64
End: 2024-09-25
Attending: ORTHOPAEDIC SURGERY
Payer: COMMERCIAL

## 2024-09-25 VITALS
RESPIRATION RATE: 12 BRPM | SYSTOLIC BLOOD PRESSURE: 127 MMHG | BODY MASS INDEX: 21.85 KG/M2 | HEIGHT: 62.25 IN | OXYGEN SATURATION: 99 % | WEIGHT: 120.25 LBS | TEMPERATURE: 98 F | DIASTOLIC BLOOD PRESSURE: 74 MMHG | HEART RATE: 66 BPM

## 2024-09-25 DIAGNOSIS — M17.12 PRIMARY OSTEOARTHRITIS OF LEFT KNEE: ICD-10-CM

## 2024-09-25 DIAGNOSIS — Z01.818 PRE-OP TESTING: ICD-10-CM

## 2024-09-25 DIAGNOSIS — Z01.818 PRE-PROCEDURAL EXAMINATION: Primary | ICD-10-CM

## 2024-09-25 DIAGNOSIS — Z01.810 PREOP CARDIOVASCULAR EXAM: ICD-10-CM

## 2024-09-25 DIAGNOSIS — Z23 NEED FOR VACCINATION: ICD-10-CM

## 2024-09-25 LAB
ALBUMIN SERPL-MCNC: 4.4 G/DL (ref 3.2–4.8)
ALBUMIN/GLOB SERPL: 1.6 {RATIO} (ref 1–2)
ALP LIVER SERPL-CCNC: 70 U/L
ALT SERPL-CCNC: 20 U/L
ANION GAP SERPL CALC-SCNC: 4 MMOL/L (ref 0–18)
ANTIBODY SCREEN: NEGATIVE
APTT PPP: 29.9 SECONDS (ref 23–36)
AST SERPL-CCNC: 28 U/L (ref ?–34)
ATRIAL RATE: 63 BPM
BASOPHILS # BLD AUTO: 0.06 X10(3) UL (ref 0–0.2)
BASOPHILS NFR BLD AUTO: 1.3 %
BILIRUB SERPL-MCNC: 0.3 MG/DL (ref 0.2–1.1)
BUN BLD-MCNC: 15 MG/DL (ref 9–23)
CALCIUM BLD-MCNC: 10.3 MG/DL (ref 8.7–10.4)
CHLORIDE SERPL-SCNC: 107 MMOL/L (ref 98–112)
CO2 SERPL-SCNC: 29 MMOL/L (ref 21–32)
CREAT BLD-MCNC: 0.93 MG/DL
EGFRCR SERPLBLD CKD-EPI 2021: 68 ML/MIN/1.73M2 (ref 60–?)
EOSINOPHIL # BLD AUTO: 0.33 X10(3) UL (ref 0–0.7)
EOSINOPHIL NFR BLD AUTO: 7.4 %
ERYTHROCYTE [DISTWIDTH] IN BLOOD BY AUTOMATED COUNT: 13.8 %
FASTING STATUS PATIENT QL REPORTED: NO
GLOBULIN PLAS-MCNC: 2.7 G/DL (ref 2–3.5)
GLUCOSE BLD-MCNC: 88 MG/DL (ref 70–99)
HCT VFR BLD AUTO: 35.8 %
HGB BLD-MCNC: 11.6 G/DL
IMM GRANULOCYTES # BLD AUTO: 0.01 X10(3) UL (ref 0–1)
IMM GRANULOCYTES NFR BLD: 0.2 %
INR BLD: 0.94 (ref 0.8–1.2)
LYMPHOCYTES # BLD AUTO: 1.74 X10(3) UL (ref 1–4)
LYMPHOCYTES NFR BLD AUTO: 39 %
MCH RBC QN AUTO: 31.1 PG (ref 26–34)
MCHC RBC AUTO-ENTMCNC: 32.4 G/DL (ref 31–37)
MCV RBC AUTO: 96 FL
MONOCYTES # BLD AUTO: 0.54 X10(3) UL (ref 0.1–1)
MONOCYTES NFR BLD AUTO: 12.1 %
NEUTROPHILS # BLD AUTO: 1.78 X10 (3) UL (ref 1.5–7.7)
NEUTROPHILS # BLD AUTO: 1.78 X10(3) UL (ref 1.5–7.7)
NEUTROPHILS NFR BLD AUTO: 40 %
OSMOLALITY SERPL CALC.SUM OF ELEC: 290 MOSM/KG (ref 275–295)
P AXIS: 62 DEGREES
P-R INTERVAL: 176 MS
PLATELET # BLD AUTO: 244 10(3)UL (ref 150–450)
POTASSIUM SERPL-SCNC: 4.6 MMOL/L (ref 3.5–5.1)
PROT SERPL-MCNC: 7.1 G/DL (ref 5.7–8.2)
PROTHROMBIN TIME: 12.6 SECONDS (ref 11.6–14.8)
Q-T INTERVAL: 404 MS
QRS DURATION: 66 MS
QTC CALCULATION (BEZET): 413 MS
R AXIS: 24 DEGREES
RBC # BLD AUTO: 3.73 X10(6)UL
RH BLOOD TYPE: NEGATIVE
SODIUM SERPL-SCNC: 140 MMOL/L (ref 136–145)
T AXIS: 54 DEGREES
VENTRICULAR RATE: 63 BPM
WBC # BLD AUTO: 4.5 X10(3) UL (ref 4–11)

## 2024-09-25 PROCEDURE — 36415 COLL VENOUS BLD VENIPUNCTURE: CPT

## 2024-09-25 PROCEDURE — 87081 CULTURE SCREEN ONLY: CPT

## 2024-09-25 PROCEDURE — 93000 ELECTROCARDIOGRAM COMPLETE: CPT | Performed by: FAMILY MEDICINE

## 2024-09-25 PROCEDURE — 86900 BLOOD TYPING SEROLOGIC ABO: CPT

## 2024-09-25 PROCEDURE — 80053 COMPREHEN METABOLIC PANEL: CPT

## 2024-09-25 PROCEDURE — 86850 RBC ANTIBODY SCREEN: CPT

## 2024-09-25 PROCEDURE — 85730 THROMBOPLASTIN TIME PARTIAL: CPT

## 2024-09-25 PROCEDURE — 90662 IIV NO PRSV INCREASED AG IM: CPT | Performed by: FAMILY MEDICINE

## 2024-09-25 PROCEDURE — 99215 OFFICE O/P EST HI 40 MIN: CPT | Performed by: FAMILY MEDICINE

## 2024-09-25 PROCEDURE — G0008 ADMIN INFLUENZA VIRUS VAC: HCPCS | Performed by: FAMILY MEDICINE

## 2024-09-25 PROCEDURE — 86901 BLOOD TYPING SEROLOGIC RH(D): CPT

## 2024-09-25 PROCEDURE — 85025 COMPLETE CBC W/AUTO DIFF WBC: CPT

## 2024-09-25 PROCEDURE — 85610 PROTHROMBIN TIME: CPT

## 2024-09-25 NOTE — PROGRESS NOTES
PRE-OP Physical   What testing is needed for this surgery/patient?  H&P,EKG, cbc,cmp,pt,ptt,mrsa,mssa nasal culture screening ,     What is the full name of procedure/ surgery? Left total knee arthoplasty     Date being surgery or procedure is being done?10/16/24    What is the doctor’s full name  that is doing the surgery?     What hospital or facility will it  be done at?[facility]     Have you  ever had anesthesia before? Yes     If yes, any previous complications with anesthesia?no

## 2024-09-25 NOTE — H&P
Criss Murphy is a 64 year old female.  Chief Complaint   Patient presents with    Pre-Op       HPI:   PREOP EXAM    PRE-OP Physical   What testing is needed for this surgery/patient?  H&P,EKG, cbc,cmp,pt,ptt,mrsa,mssa nasal culture screening ,     What is the full name of procedure/ surgery? Left total knee arthoplasty     Date being surgery or procedure is being done?10/16/24    What is the doctor’s full name  that is doing the surgery?     What hospital or facility will it  be done at?[facility]     Have you  ever had anesthesia before? Yes     If yes, any previous complications with anesthesia?no       MEDICALLY CLEAR FOR SURGERY      ALLERGIES:  Allergies   Allergen Reactions    Peanuts ANAPHYLAXIS    Tree Nuts HIVES     Breathing problems    Cat Hair Extract ITCHING    Dander ITCHING     Cat dander    Pcn [Penicillins] RASH    Seasonal OTHER (SEE COMMENTS)     Runny nose, sneezing          has a past surgical history that includes adenoidectomy; colonoscopy; other surgical history (Left); other surgical history (Right); other surgical history; other surgical history; and other surgical history.   has a past medical history of Asthma ([facility]), COVID (12/19/2020), Osteoarthritis, and Visual impairment.    She has no past medical history of Anesthesia complication, Difficult intubation, Family history of malignant hyperthermia, Family history of pseudocholinesterase deficiency, History of adverse reaction to anesthesia, motion sickness, Malignant hyperthermia, PONV (postoperative nausea and vomiting), or Pseudocholinesterase deficiency.  family history includes Cancer in her sister; Colon Cancer in her mother; Diabetes in her father; Heart Disorder in her father.     has a current medication list which includes the following prescription(s): bupropion sr, multi-vitamin/minerals, ferrous sulfate, benefiber on the go, diclofenac, nystatin, albuterol, combipatch, azelastine-fluticasone,  budesonide, and albuterol.        Social History:  Social History     Socioeconomic History    Marital status:    Occupational History    Occupation:    Tobacco Use    Smoking status: Never    Smokeless tobacco: Never   Vaping Use    Vaping status: Never Used   Substance and Sexual Activity    Alcohol use: Never    Drug use: Never     Social Determinants of Health      Received from [facility], [facility]    Social Connections    Received from [facility], [facility]    Housing Stability          REVIEW OF SYSTEMS:   GENERAL HEALTH: feels well no complaints  SKIN: denies any unusual skin lesions or rashes  RESPIRATORY: denies shortness of breath with exertion  CARDIOVASCULAR: denies chest pain on exertion  GI: denies abdominal pain and denies heartburn  NEURO: denies headaches    EXAM:   /74 (BP Location: Left arm, Patient Position: Sitting, Cuff Size: large)   Pulse 66   Temp 98.2 °F (36.8 °C) (Temporal)   Resp 12   Ht 5' 2.25\" (1.581 m)   Wt 120 lb 4 oz (54.5 kg)   LMP 05/11/2014 (Exact Date)   SpO2 99%   BMI 21.82 kg/m²  Body mass index is 21.82 kg/m².      GENERAL: well developed, well nourished,in no apparent distress  SKIN: no rashes,no suspicious lesions  HEENT: atraumatic, normocephalic,ears and throat are clear  NECK: supple,no adenopathy,no bruits  LUNGS: clear to auscultation  CARDIO: RRR without murmur  GI: good BS's,no masses, HSM or tenderness  EXTREMITIES: no cyanosis, clubbing or edema    ASSESSMENT AND PLAN:     1. Pre-procedural examination (Primary)  Comments:  medically clear for surgery  2. Preop cardiovascular exam  -     EKG with interpretation and Report -IN OFFICE [43010]  3. Primary osteoarthritis of left knee  Comments:  for Left knee total knee arthroplasty 10/2024  4. Need for vaccination  Comments:  2024=-25 flu vaccine today  Orders:  -     INFLUENZA VAC HIGH DOSE PRSV  FREE          EKG  INTERPRETED BY DR RANGEL     Component  Ref Range & Units 10:47 AM 1 yr ago   Ventricular rate  BPM 63 61   Atrial rate  BPM 63 61   P-R Interval  ms 176 170   QRS Duration  ms 66 66   Q-T Interval  ms 404 418   QTC Calculation (Bezet)  ms 413 420   P Axis  degrees 62 7   R Axis  degrees 24 37   T Axis  degrees 54 10     MUSE          Normal sinus rhythm  Low voltage QRS, consider pulmonary disease, pericardial effusion, or normal variant  Borderline ECG  When compared with ECG of 24-FEB-2023 11:11,  Criteria for Septal infarct are no longer Present  Confirmed by Neftaly Rangel ([id number]) on 9/25/2024 12:06:23 PM      Specimen Collected: 09/25/24 10:47 AM

## 2024-10-03 ENCOUNTER — PATIENT MESSAGE (OUTPATIENT)
Dept: FAMILY MEDICINE CLINIC | Facility: CLINIC | Age: 64
End: 2024-10-03

## 2024-10-03 NOTE — TELEPHONE ENCOUNTER
From: Criss Murphy  To: Neftaly Claire  Sent: 10/3/2024 11:17 AM CDT  Subject: Vaccines     I’m scheduling a Covid vaccine and one question is what other vaccines have i recently had. I’m having trouble finding info on my chart. Would you have a quick reference? I believe I’ve had pneumonia and shingles and just had flu shot last visit. They want to know month and year. Thank you

## 2024-10-09 RX ORDER — ESTRADIOL/NORETHINDRONE ACETATE TRANSDERMAL SYSTEM .05; .14 MG/D; MG/D
1 PATCH, EXTENDED RELEASE TRANSDERMAL WEEKLY
Qty: 12 PATCH | Refills: 1 | Status: SHIPPED | OUTPATIENT
Start: 2024-10-09 | End: 2025-04-07

## 2024-10-09 NOTE — TELEPHONE ENCOUNTER
LOV::9/25/24    LAST LAB::9/25/24    LAST RX:  Estradiol-Norethindrone Acet (COMBIPATCH) 0.05-0.14 MG/DAY Transdermal Patch Biweekly 24 patch 3 9/14/2022 --   Sig:   Place 1 patch onto the skin twice a         Next OV:   Future Appointments   Date Time Provider Department Center   10/21/2024 10:15 AM Miles Madera, PT [facility] [place]   10/24/2024 10:15 AM Miles Madera, PT [facility] [place]   10/29/2024 10:15 AM Miles Madera, PT [facility] [place]   10/31/2024 10:15 AM Miles Madera, PT [facility] [place]   11/4/2024  8:00 AM Miles Madera, PT [facility] [place]   11/7/2024  8:00 AM Miles Madera, PT [facility] [place]   11/11/2024  8:00 AM Miles Madera, PT [facility] [place]   11/14/2024  8:00 AM Miles Madera, PT [facility] [place]   11/18/2024  8:00 AM Miles Madera, PT [facility] [place]   11/21/2024  8:00 AM Miles Madera, PT [facility] [place]   11/25/2024  8:00 AM Miles Madera, PT [facility] [place]   12/2/2024  8:00 AM Miles Madera, PT [facility] [place]          PROTOCOL    Gynecology Medication Protocol Mnupnn25/08/2024 09:16 PM    PASS--PENDING LAST PAP WNL--VIA MANUAL LOOKUP    Mammogram in past 12 months    Physical or Pelvic/Breast in past 12 or next 3 mos--VIA MANUAL LOOKUP

## 2024-10-09 NOTE — H&P
[facility]  History & Physical    Criss Murphy Patient Status:  Hospital Outpatient Surgery    1960 MRN [id number]   Location [facility] SURGERY Attending Kiet Greene MD   Hosp Day # 0 PCP Neftaly Rangel MD     Date of Admission:  (Not on file)    History of Present Illness:  Criss Murphy is a(n) 64 year old female.  The patient has failed conservative treatment for left knee osteoarthritis and has significant limitations in ADL's including ambulation and exercise, and presents for total joint arthroplasty at this time.    History:  Past Medical History:    Asthma (HCC)    COVID    No hospitalization. Lost taste /smell, HA    Osteoarthritis    Visual impairment    glasses     Past Surgical History:   Procedure Laterality Date    Adenoidectomy      Colonoscopy      Other surgical history Left     knee sx for torn cartilage    Other surgical history Right     Hammer toe sx    Other surgical history      Nasal sx    Other surgical history      dental implants    Other surgical history      bladder sling     Family History   Problem Relation Age of Onset    Diabetes Father     Heart Disorder Father     Colon Cancer Mother     Cancer Sister       reports that she has never smoked. She has never used smokeless tobacco. She reports that she does not drink alcohol and does not use drugs.    Allergies:  Allergies[1]    Home Medications:  Prescriptions Prior to Admission[2]    Physical Exam:   General: Alert, orientated x3.  Cooperative.  No apparent distress.  Vital Signs:  Height 5' 2.5\" (1.588 m), weight 115 lb (52.2 kg), last menstrual period 2014.  HEENT: Exam is unremarkable.    Neck: No tenderness to palpitation. No JVD. Supple.   Lungs: Clear to auscultation bilaterally.  Cardiac: Regular rate and rhythm. No murmur.  Abdomen:  Soft, non-distended, non-tender, with no rebound or guarding.   Extremities:  No lower extremity edema noted.  Without clubbing or cyanosis.     The left knee is tender at the medial and lateral joint lines, with crepitus on range of motion    Laboratory Data:  xrays show severe osteoarthritis of the left knee    Impression and Plan:  Patient Active Problem List   Diagnosis    RAD (reactive airway disease) (HCC)    Allergic rhinitis       Plan is for cemented left total knee arthroplasty        Kiet Greene MD  10/9/2024  3:22 PM         [1]   Allergies  Allergen Reactions    Peanuts ANAPHYLAXIS    Tree Nuts HIVES     Breathing problems    Cat Hair Extract ITCHING    Dander ITCHING     Cat dander    Pcn [Penicillins] RASH    Seasonal OTHER (SEE COMMENTS)     Runny nose, sneezing   [2]   No medications prior to admission.

## 2024-10-11 ENCOUNTER — MED REC SCAN ONLY (OUTPATIENT)
Dept: FAMILY MEDICINE CLINIC | Facility: CLINIC | Age: 64
End: 2024-10-11

## 2024-10-11 ENCOUNTER — TELEPHONE (OUTPATIENT)
Dept: FAMILY MEDICINE CLINIC | Facility: CLINIC | Age: 64
End: 2024-10-11

## 2024-10-11 NOTE — TELEPHONE ENCOUNTER
Fax received from [facility] to clarify directions for use on combipatch,   Prescription sent for apply one patch onto the skin daily and pharmacy clarifying if it should be twice weekly    This nurse reached out to patient yesterday and who reported she uses once a week and doing well on this dose.      Reviewed with Dr Rangel who authorized patient may continue at once weekly.    Document signed and faxed back to [facility]    Copy to scan.

## 2024-10-11 NOTE — TELEPHONE ENCOUNTER
See other telephone encounter from today    Call returned to [facility] (Geremias) and clarified that patient using combipatch once a week. Verbalized understanding.    Breath sounds clear and equal bilaterally.

## 2024-10-11 NOTE — TELEPHONE ENCOUNTER
Ronny calling from [facility] calling to verify directions for Estradiol-Norethindrone Acet (COMBIPATCH) 0.05-0.14 MG/DAY Transdermal Patch Biweekly Ref #[id number]

## 2024-10-14 ENCOUNTER — TELEPHONE (OUTPATIENT)
Dept: FAMILY MEDICINE CLINIC | Facility: CLINIC | Age: 64
End: 2024-10-14

## 2024-10-14 NOTE — TELEPHONE ENCOUNTER
Ana from [facility] called to clarify directions on COMBIPATCH.  When calling hit option 2.  Ref#  [id number]

## 2024-10-14 NOTE — TELEPHONE ENCOUNTER
See telephone encounters 10-11-24 also. Reviewed prescription with pharmacist at [facility].

## 2024-10-16 ENCOUNTER — ANESTHESIA EVENT (OUTPATIENT)
Dept: SURGERY | Facility: HOSPITAL | Age: 64
End: 2024-10-16
Payer: MEDICARE

## 2024-10-16 ENCOUNTER — HOSPITAL ENCOUNTER (OUTPATIENT)
Facility: HOSPITAL | Age: 64
Setting detail: HOSPITAL OUTPATIENT SURGERY
Discharge: HOME HEALTH | End: 2024-10-16
Attending: ORTHOPAEDIC SURGERY | Admitting: ORTHOPAEDIC SURGERY
Payer: MEDICARE

## 2024-10-16 ENCOUNTER — ANESTHESIA (OUTPATIENT)
Dept: SURGERY | Facility: HOSPITAL | Age: 64
End: 2024-10-16
Payer: MEDICARE

## 2024-10-16 VITALS
TEMPERATURE: 98 F | DIASTOLIC BLOOD PRESSURE: 80 MMHG | OXYGEN SATURATION: 98 % | HEART RATE: 70 BPM | HEIGHT: 62.5 IN | SYSTOLIC BLOOD PRESSURE: 122 MMHG | WEIGHT: 117.63 LBS | RESPIRATION RATE: 14 BRPM | BODY MASS INDEX: 21.11 KG/M2

## 2024-10-16 DIAGNOSIS — M17.12 PRIMARY OSTEOARTHRITIS OF LEFT KNEE: Primary | ICD-10-CM

## 2024-10-16 DIAGNOSIS — Z01.818 PRE-OP TESTING: ICD-10-CM

## 2024-10-16 PROCEDURE — 97161 PT EVAL LOW COMPLEX 20 MIN: CPT

## 2024-10-16 PROCEDURE — 0SRD0J9 REPLACEMENT OF LEFT KNEE JOINT WITH SYNTHETIC SUBSTITUTE, CEMENTED, OPEN APPROACH: ICD-10-PCS | Performed by: ORTHOPAEDIC SURGERY

## 2024-10-16 PROCEDURE — 76942 ECHO GUIDE FOR BIOPSY: CPT | Performed by: ANESTHESIOLOGY

## 2024-10-16 PROCEDURE — 97116 GAIT TRAINING THERAPY: CPT

## 2024-10-16 PROCEDURE — 88311 DECALCIFY TISSUE: CPT | Performed by: ORTHOPAEDIC SURGERY

## 2024-10-16 PROCEDURE — 88305 TISSUE EXAM BY PATHOLOGIST: CPT | Performed by: ORTHOPAEDIC SURGERY

## 2024-10-16 DEVICE — SMARTSET HV HIGH VISCOSITY BONE CEMENT 40G
Type: IMPLANTABLE DEVICE | Site: KNEE | Status: FUNCTIONAL
Brand: SMARTSET

## 2024-10-16 DEVICE — ATTUNE KNEE SYSTEM TIBIAL BASE ROTATING PLATFORM SIZE 5 CEMENTED
Type: IMPLANTABLE DEVICE | Site: KNEE | Status: FUNCTIONAL
Brand: ATTUNE

## 2024-10-16 DEVICE — ATTUNE PATELLA MEDIALIZED DOME 38MM CEMENTED AOX
Type: IMPLANTABLE DEVICE | Site: KNEE | Status: FUNCTIONAL
Brand: ATTUNE

## 2024-10-16 DEVICE — ATTUNE KNEE SYSTEM FEMORAL POSTERIOR STABILIZED SIZE 5 LEFT CEMENTED
Type: IMPLANTABLE DEVICE | Site: KNEE | Status: FUNCTIONAL
Brand: ATTUNE

## 2024-10-16 DEVICE — ATTUNE KNEE SYSTEM TIBIAL INSERT ROTATING PLATFORM POSTERIOR STABILIZED 5 7MM AOX
Type: IMPLANTABLE DEVICE | Site: KNEE | Status: FUNCTIONAL
Brand: ATTUNE

## 2024-10-16 RX ORDER — ONDANSETRON 2 MG/ML
4 INJECTION INTRAMUSCULAR; INTRAVENOUS EVERY 6 HOURS PRN
Status: DISCONTINUED | OUTPATIENT
Start: 2024-10-16 | End: 2024-10-16

## 2024-10-16 RX ORDER — HYDROMORPHONE HYDROCHLORIDE 1 MG/ML
0.6 INJECTION, SOLUTION INTRAMUSCULAR; INTRAVENOUS; SUBCUTANEOUS EVERY 5 MIN PRN
Status: DISCONTINUED | OUTPATIENT
Start: 2024-10-16 | End: 2024-10-16

## 2024-10-16 RX ORDER — ACETAMINOPHEN 325 MG/1
650 TABLET ORAL
Status: DISCONTINUED | OUTPATIENT
Start: 2024-10-16 | End: 2024-10-16

## 2024-10-16 RX ORDER — HYDROCODONE BITARTRATE AND ACETAMINOPHEN 5; 325 MG/1; MG/1
2 TABLET ORAL ONCE AS NEEDED
Status: DISCONTINUED | OUTPATIENT
Start: 2024-10-16 | End: 2024-10-16

## 2024-10-16 RX ORDER — TIZANIDINE 2 MG/1
1 TABLET ORAL EVERY 6 HOURS PRN
Status: DISCONTINUED | OUTPATIENT
Start: 2024-10-16 | End: 2024-10-16

## 2024-10-16 RX ORDER — OXYCODONE HYDROCHLORIDE 5 MG/1
2.5 TABLET ORAL EVERY 4 HOURS PRN
Status: DISCONTINUED | OUTPATIENT
Start: 2024-10-16 | End: 2024-10-16

## 2024-10-16 RX ORDER — OXYCODONE HYDROCHLORIDE 5 MG/1
TABLET ORAL
Status: COMPLETED
Start: 2024-10-16 | End: 2024-10-16

## 2024-10-16 RX ORDER — ASPIRIN 81 MG/1
81 TABLET ORAL 2 TIMES DAILY
Qty: 28 TABLET | Refills: 0 | Status: SHIPPED | OUTPATIENT
Start: 2024-10-16

## 2024-10-16 RX ORDER — DEXAMETHASONE SODIUM PHOSPHATE 10 MG/ML
INJECTION, SOLUTION INTRAMUSCULAR; INTRAVENOUS AS NEEDED
Status: DISCONTINUED | OUTPATIENT
Start: 2024-10-16 | End: 2024-10-16 | Stop reason: SURG

## 2024-10-16 RX ORDER — TRANEXAMIC ACID 10 MG/ML
INJECTION, SOLUTION INTRAVENOUS
Status: COMPLETED
Start: 2024-10-16 | End: 2024-10-16

## 2024-10-16 RX ORDER — HYDROMORPHONE HYDROCHLORIDE 1 MG/ML
0.4 INJECTION, SOLUTION INTRAMUSCULAR; INTRAVENOUS; SUBCUTANEOUS EVERY 5 MIN PRN
Status: DISCONTINUED | OUTPATIENT
Start: 2024-10-16 | End: 2024-10-16

## 2024-10-16 RX ORDER — DIPHENHYDRAMINE HCL 25 MG
25 CAPSULE ORAL EVERY 4 HOURS PRN
Status: DISCONTINUED | OUTPATIENT
Start: 2024-10-16 | End: 2024-10-16

## 2024-10-16 RX ORDER — TRANEXAMIC ACID 10 MG/ML
1000 INJECTION, SOLUTION INTRAVENOUS ONCE
Status: COMPLETED | OUTPATIENT
Start: 2024-10-16 | End: 2024-10-16

## 2024-10-16 RX ORDER — ACETAMINOPHEN 500 MG
1000 TABLET ORAL ONCE AS NEEDED
Status: DISCONTINUED | OUTPATIENT
Start: 2024-10-16 | End: 2024-10-16

## 2024-10-16 RX ORDER — DIPHENHYDRAMINE HYDROCHLORIDE 50 MG/ML
12.5 INJECTION INTRAMUSCULAR; INTRAVENOUS EVERY 4 HOURS PRN
Status: DISCONTINUED | OUTPATIENT
Start: 2024-10-16 | End: 2024-10-16

## 2024-10-16 RX ORDER — TRAMADOL HYDROCHLORIDE 50 MG/1
50 TABLET ORAL EVERY 6 HOURS PRN
Qty: 50 TABLET | Refills: 0 | Status: SHIPPED | OUTPATIENT
Start: 2024-10-16

## 2024-10-16 RX ORDER — HYDROMORPHONE HYDROCHLORIDE 1 MG/ML
0.2 INJECTION, SOLUTION INTRAMUSCULAR; INTRAVENOUS; SUBCUTANEOUS EVERY 2 HOUR PRN
Status: DISCONTINUED | OUTPATIENT
Start: 2024-10-16 | End: 2024-10-16

## 2024-10-16 RX ORDER — METOCLOPRAMIDE HYDROCHLORIDE 5 MG/ML
10 INJECTION INTRAMUSCULAR; INTRAVENOUS EVERY 8 HOURS PRN
Status: DISCONTINUED | OUTPATIENT
Start: 2024-10-16 | End: 2024-10-16

## 2024-10-16 RX ORDER — SODIUM CHLORIDE, SODIUM LACTATE, POTASSIUM CHLORIDE, CALCIUM CHLORIDE 600; 310; 30; 20 MG/100ML; MG/100ML; MG/100ML; MG/100ML
INJECTION, SOLUTION INTRAVENOUS CONTINUOUS
Status: DISCONTINUED | OUTPATIENT
Start: 2024-10-16 | End: 2024-10-16

## 2024-10-16 RX ORDER — POLYETHYLENE GLYCOL 3350 17 G/17G
17 POWDER, FOR SOLUTION ORAL DAILY PRN
Status: DISCONTINUED | OUTPATIENT
Start: 2024-10-16 | End: 2024-10-16

## 2024-10-16 RX ORDER — ACETAMINOPHEN 500 MG
1000 TABLET ORAL ONCE
Status: DISCONTINUED | OUTPATIENT
Start: 2024-10-16 | End: 2024-10-16 | Stop reason: HOSPADM

## 2024-10-16 RX ORDER — DEXAMETHASONE SODIUM PHOSPHATE 10 MG/ML
8 INJECTION, SOLUTION INTRAMUSCULAR; INTRAVENOUS ONCE
Status: DISCONTINUED | OUTPATIENT
Start: 2024-10-17 | End: 2024-10-16

## 2024-10-16 RX ORDER — OXYCODONE HYDROCHLORIDE 5 MG/1
5 TABLET ORAL EVERY 4 HOURS PRN
Qty: 60 TABLET | Refills: 0 | Status: SHIPPED | OUTPATIENT
Start: 2024-10-16

## 2024-10-16 RX ORDER — CYCLOBENZAPRINE HCL 10 MG
10 TABLET ORAL EVERY 8 HOURS PRN
Status: DISCONTINUED | OUTPATIENT
Start: 2024-10-16 | End: 2024-10-16

## 2024-10-16 RX ORDER — SENNOSIDES 8.6 MG
17.2 TABLET ORAL NIGHTLY
Status: DISCONTINUED | OUTPATIENT
Start: 2024-10-16 | End: 2024-10-16

## 2024-10-16 RX ORDER — ONDANSETRON 4 MG/1
4 TABLET, FILM COATED ORAL EVERY 8 HOURS PRN
Qty: 30 TABLET | Refills: 1 | Status: SHIPPED | OUTPATIENT
Start: 2024-10-16

## 2024-10-16 RX ORDER — ASPIRIN 81 MG/1
81 TABLET ORAL 2 TIMES DAILY
Status: DISCONTINUED | OUTPATIENT
Start: 2024-10-16 | End: 2024-10-16

## 2024-10-16 RX ORDER — FERROUS SULFATE 325(65) MG
325 TABLET, DELAYED RELEASE (ENTERIC COATED) ORAL
Status: DISCONTINUED | OUTPATIENT
Start: 2024-10-16 | End: 2024-10-16

## 2024-10-16 RX ORDER — ACETAMINOPHEN 500 MG
1000 TABLET ORAL 3 TIMES DAILY
Qty: 90 TABLET | Refills: 0 | Status: SHIPPED | OUTPATIENT
Start: 2024-10-16

## 2024-10-16 RX ORDER — BISACODYL 10 MG
10 SUPPOSITORY, RECTAL RECTAL
Status: DISCONTINUED | OUTPATIENT
Start: 2024-10-16 | End: 2024-10-16

## 2024-10-16 RX ORDER — TRAMADOL HYDROCHLORIDE 50 MG/1
50 TABLET ORAL EVERY 6 HOURS SCHEDULED
Status: DISCONTINUED | OUTPATIENT
Start: 2024-10-16 | End: 2024-10-16

## 2024-10-16 RX ORDER — ACETAMINOPHEN 325 MG/1
TABLET ORAL
Status: COMPLETED
Start: 2024-10-16 | End: 2024-10-16

## 2024-10-16 RX ORDER — DOCUSATE SODIUM 100 MG/1
100 CAPSULE, LIQUID FILLED ORAL 2 TIMES DAILY
Status: DISCONTINUED | OUTPATIENT
Start: 2024-10-16 | End: 2024-10-16

## 2024-10-16 RX ORDER — MIDAZOLAM HYDROCHLORIDE 1 MG/ML
1 INJECTION INTRAMUSCULAR; INTRAVENOUS EVERY 5 MIN PRN
Status: DISCONTINUED | OUTPATIENT
Start: 2024-10-16 | End: 2024-10-16

## 2024-10-16 RX ORDER — MIDAZOLAM HYDROCHLORIDE 1 MG/ML
INJECTION INTRAMUSCULAR; INTRAVENOUS AS NEEDED
Status: DISCONTINUED | OUTPATIENT
Start: 2024-10-16 | End: 2024-10-16 | Stop reason: SURG

## 2024-10-16 RX ORDER — NALOXONE HYDROCHLORIDE 0.4 MG/ML
0.08 INJECTION, SOLUTION INTRAMUSCULAR; INTRAVENOUS; SUBCUTANEOUS AS NEEDED
Status: DISCONTINUED | OUTPATIENT
Start: 2024-10-16 | End: 2024-10-16

## 2024-10-16 RX ORDER — OXYCODONE HYDROCHLORIDE 5 MG/1
5 TABLET ORAL EVERY 4 HOURS PRN
Status: DISCONTINUED | OUTPATIENT
Start: 2024-10-16 | End: 2024-10-16

## 2024-10-16 RX ORDER — HYDROCODONE BITARTRATE AND ACETAMINOPHEN 5; 325 MG/1; MG/1
1 TABLET ORAL ONCE AS NEEDED
Status: DISCONTINUED | OUTPATIENT
Start: 2024-10-16 | End: 2024-10-16

## 2024-10-16 RX ORDER — DIPHENHYDRAMINE HYDROCHLORIDE 50 MG/ML
25 INJECTION INTRAMUSCULAR; INTRAVENOUS ONCE AS NEEDED
Status: DISCONTINUED | OUTPATIENT
Start: 2024-10-16 | End: 2024-10-16

## 2024-10-16 RX ORDER — SODIUM PHOSPHATE, DIBASIC AND SODIUM PHOSPHATE, MONOBASIC 7; 19 G/230ML; G/230ML
1 ENEMA RECTAL ONCE AS NEEDED
Status: DISCONTINUED | OUTPATIENT
Start: 2024-10-16 | End: 2024-10-16

## 2024-10-16 RX ORDER — TRANEXAMIC ACID 10 MG/ML
1000 INJECTION, SOLUTION INTRAVENOUS ONCE
Status: DISCONTINUED | OUTPATIENT
Start: 2024-10-16 | End: 2024-10-16 | Stop reason: HOSPADM

## 2024-10-16 RX ORDER — HYDROMORPHONE HYDROCHLORIDE 1 MG/ML
0.4 INJECTION, SOLUTION INTRAMUSCULAR; INTRAVENOUS; SUBCUTANEOUS EVERY 2 HOUR PRN
Status: DISCONTINUED | OUTPATIENT
Start: 2024-10-16 | End: 2024-10-16

## 2024-10-16 RX ORDER — ACETAMINOPHEN 325 MG/1
650 TABLET ORAL ONCE
Status: COMPLETED | OUTPATIENT
Start: 2024-10-16 | End: 2024-10-16

## 2024-10-16 RX ORDER — HYDROMORPHONE HYDROCHLORIDE 1 MG/ML
0.2 INJECTION, SOLUTION INTRAMUSCULAR; INTRAVENOUS; SUBCUTANEOUS EVERY 5 MIN PRN
Status: DISCONTINUED | OUTPATIENT
Start: 2024-10-16 | End: 2024-10-16

## 2024-10-16 RX ORDER — MEPERIDINE HYDROCHLORIDE 25 MG/ML
25 INJECTION INTRAMUSCULAR; INTRAVENOUS; SUBCUTANEOUS
Status: DISCONTINUED | OUTPATIENT
Start: 2024-10-16 | End: 2024-10-16

## 2024-10-16 RX ADMIN — DEXAMETHASONE SODIUM PHOSPHATE 2 MG: 10 INJECTION, SOLUTION INTRAMUSCULAR; INTRAVENOUS at 08:49:00

## 2024-10-16 RX ADMIN — SODIUM CHLORIDE, SODIUM LACTATE, POTASSIUM CHLORIDE, CALCIUM CHLORIDE: 600; 310; 30; 20 INJECTION, SOLUTION INTRAVENOUS at 09:40:00

## 2024-10-16 RX ADMIN — MIDAZOLAM HYDROCHLORIDE 2 MG: 1 INJECTION INTRAMUSCULAR; INTRAVENOUS at 08:39:00

## 2024-10-16 NOTE — ANESTHESIA PROCEDURE NOTES
Spinal Block    Date/Time: 10/16/2024 8:41 AM    Performed by: Syed Morgan MD  Authorized by: Syed Morgan MD      General Information and Staff    Start Time:  10/16/2024 8:41 AM  End Time:  10/16/2024 8:44 AM  Anesthesiologist:  Syed Morgan MD  Performed by:  Anesthesiologist  Patient Location:  OR  Site identification: surface landmarks    Reason for Block: at surgeon's request and surgical anesthesia    Preanesthetic Checklist: patient identified, IV checked, risks and benefits discussed, monitors and equipment checked, pre-op evaluation, timeout performed, anesthesia consent and sterile technique used      Procedure Details    Patient Position:  Sitting  Prep: ChloraPrep    Monitoring:  Cardiac monitor, heart rate and continuous pulse ox  Approach:  Midline  Location:  L3-4  Injection Technique:  Single-shot    Needle    Needle Type:  Sprotte  Needle Gauge:  24 G  Needle Length:  3.5 in    Assessment    Sensory Level:   Events: clear CSF, CSF aspirated, well tolerated and blood negative      Additional Comments

## 2024-10-16 NOTE — DISCHARGE INSTRUCTIONS
Sometimes managing your health at home requires assistance.  The [facility] team has recognized your preference to use [facility].  They can be reached by phone at [phone].  The fax number for your reference is [phone].  A representative from the home health agency will contact you or your family to schedule your first visit.        Knee Replacement Discharge Instructions    View knee discharge education video at [email or web address] .  Choose + after surgery      Spanda- knee replacement (single layer compression sleeve):  All patients should wear the compression sleeves (SPANDA-) until first follow up visit to surgeon’s office ( about 2-3 weeks) and then check with surgeon if need to continue use.  Take off to shower. Best to keep on as much as possible, even at night.  Wash with mild soap and water; DRIP dry overnight.    Directions to put on and off:  IT TAKES 2 PIECES OF SPANDA- (compression sleeves), (USUALLY DIFFERENT SIZES because a calf is usually smaller than a knee.)   Use the longer tube (spanda-/compression sleeve) pulled up over the calf.   This 1st piece (spanda-/compression sleeve) should be on the calf part of the leg, from just below the knee to the ball of the foot to expose the toes.   The 2nd piece (shorter compression sleeve) is pulled over and past the first sleeve onto the knee. The lower edge of the knee portion should overlap the top of the calf spanda- by a few inches. This is the only place where the 2 different pieces overlap.  The top edge of the second spanda- should be about a few inches above the knee but it should NOT be rolling down. The spanda- should be flat so that it does not roll and become too tight.  Makes sure it is NOT bunched up anywhere.   IF the spanda- feels TOO tight, then REMOVE it and call your surgeon to let them know.          Activity    Bathing  No tub  baths, pools, or saunas until cleared by surgeon (about 4-6 weeks because it takes that long for the incision on the skin to heal and be a barrier to prevent infection).  When allowed to shower:    IF AQUACEL - dressing is waterproof and does not require being covered to keep it dry.  Pat dressing and surrounding skin dry after shower              AQUACEL    Gauze dressings are NOT waterproof and REQUIRE being covered with a waterproof barrier to keep the dressing and the incision dry.  SARAN WRAP, GLAD WRAP, and PRESS N SEAL WORK  REALLY WELL BUT ANY PLASTIC WRAP WILL DO.   Do not wash incision.   Remove entire wrapping and old dressing (if Gauze) after showering. Pat dry if necessary WITH A CLEAN TOWEL and cover incision with dry sterile gauze and paper tape. For other types of dressings, follow surgeon’s orders.                           GAUZE          Driving  Do not drive until cleared by surgeon. This is usually in four to six weeks after surgery. Discuss at follow-up office visit.   Not allowed while taking narcotic pain medication or muscle relaxants.    Sex  Usually allowed after four to six weeks - check with surgeon at your office visit.    Return to work  Usually allowed after four to six weeks. Discuss specific work activities with your surgeon.    Restrictions  For knee replacement surgery, follow instructions provided by physical therapy.  Do NOT put a pillow under your knee as it may be more difficult to straighten afterwards.    No smoking  Avoid smoking. It is known to cause breathing problems and can decrease the rate of healing.    Incision care/Dressing changes  Wash hands before and after dressing changes.  FOR DRY GAUZE DRESSING:  Change dressing daily using dry sterile gauze and paper tape once Aquacel (waterproof) dressing changed (which is about 7 days after surgery). Continue this until you follow up in the office with your surgeon. Have knee bent when changing dressing for more ease of  bending afterwards.  There could be a small amount of redness around the staples or incision; this is normal.  Watch for increased redness, warmth, any odor, increased drainage or opening of the incision. A little clear yellow or blood tinged drainage is normal up to 2 weeks after surgery but it should be less every day until it stops.  Call physician if you notice any concerning changes.  Sutures/staples will be removed at first office visit (10 days- 3 weeks).                       GAUZE                                               Medication  Anticoagulants = blood thinners (Xarelto, Eliquis, Lovenox, Coumadin, or Aspirin)  Pill or shot form depending on what your physician orders.   IF placed on Coumadin, you may also need lab work done for monitoring.  You will bleed easier and bruise easier while on these medications.     Usually you will be on a blood thinner for about 2-5 weeks depending what physician orders.  Contact your physician if you have signs of bruising, nose bleeds or blood in your urine. You may consider using electric razors and soft bristle toothbrushes.  Do not take aspirin while taking blood thinners unless ordered by your physician.  Review anticoagulant education information sheet provided.    Discomfort  Surgical discomfort is normal for one to two months.  Have realistic goals and keep a positive outlook.  You may need pain medication regularly (every 4-6 hours) the first 2 weeks and then begin to decrease how often you are taking it.  Take pain medication as prescribed with food, especially before therapy, allowing 30-60 minutes to take effect.  Do not drink alcohol while on pain medication.  Keep pain manageable; pain should not disrupt your sleep or activities like getting out of bed or walking.  As you have less discomfort, decrease the amount of pain medication you take. Use plain Tylenol (acetaminophen) for less severe pain.  Some pain medications have Tylenol (acetaminophen) in  them such as Norco and Percocet. Do NOT take Tylenol (acetaminophen) within 4 hours of a dose of these medications.  Apply ice or cold therapy to surgical site for 20 minutes at least four times a day, especially after therapy. Be sure there is a thin cloth barrier between skin and ice or cold therapy.  Change position at least every 45 minutes while awake to avoid stiffness or increased discomfort.  For knee replacements- may elevate your leg by placing a pillow under entire leg. Do not place pillow only under the knee.  Have realistic goals and keep a positive outlook.  Deep breathing and relaxation techniques and distractions can help!  If you focus on something else, you do not experience the pain the same. Take advantage of everything available to your to help control you discomfort.  Contact physician if discomfort does not respond to pain medication.    Body changes  Constipation is common with the use of narcotics.  Eat fiber rich foods and drink plenty of fluids.  Use stool softeners such as Colace or Senakot while on narcotics, and laxatives such as Miralax or Milk of Magnesia if needed.   An enema or suppository may be needed if above measures do not work.    Prevention of infection and promotion of healing  Good hand washing is important. Everyone should wash their hands or use hand  as soon as they walk in your house-whether they live there or are visiting.  Keep bed linen/clothing freshly laundered.  Do not allow others or pets to touch your incision.  Avoid people that have colds or the flu.  Your surgeon may recommend that you take antibiotics before you undergo any dental or other invasive surgical procedures after your joint replacement. Speak with your physician about this at your post-op office visit.  Eat a balanced diet high in fiber and drink plenty of fluids.   Continue using incentive spirometry because narcotics make you sleepy so you may not take good deep breaths. We do not want  you to get pneumonia.     Post op Office visits  Schedule 10 days to 3 weeks after surgery WITH SURGEON’s office.  Additional visits may need to be scheduled. Your physician will discuss this at first post-op office visit.  Schedule outpatient physical therapy per your surgeon’s orders.  Schedule one week follow up after surgery WITH PRIMARY CARE PHYSICIAN; review your medications over last 6 months. Your body gets stressed by surgery and that stress can affect all your other health issues (such as high blood pressure, diabetes, CHF, afib, and asthma just to name a few).  We don’t want those other health issues to cause you to get readmitted to the hospital; much better for you to catch developing problems and prevent them from becoming larger ones.  DALJIT HOSE - IF ordered by your surgeon, wear these during the day and off at night.      Notify your surgeon if you notice any  of the following signs  Separation of incision line.  Increased redness, swelling, or warmth of skin around incision.  Increased or foul smelling drainage from incision  Red streaks on skin near incision.  Temperature >101 F.  Increased pain at incision not relieved by pain medication.      Signs of blood clot  Pain, excessive tenderness, redness, or swelling in leg or calf (other than incision site).  (CALL SURGEON)    Go directly to the ER or CALL 911 if  you:  become short of breath  have chest pain  cough up blood  have unexplained anxiety with breathing  Traveling and Handicapped parking  Check with you surgeon when allowed so you don’t set yourself up for greater chance of complications.  If traveling by car, get out to stretch every 2 hours.  This helps prevent stiffness.  You may need to do this any time you travel for the first year after surgery.  If traveling by plane, BEFORE you get into a security line, let them know that you had your hip replaced, as you will most likely set off the metal detector. The doctors no longer provide an  identification card for this as they are easily copied. ALSO request a wheelchair the first year to board and get off a plane…this aids in priority seating and you should sit on the aisle or at the bulkhead where you can easily stretch your legs and get up to walk up and down the aisles…this helps prevent blood clots and stiffness.  TEMPORARY HANDICAP PARKING APPLICATION  (good for 3-6 months)  - At Surgeon or PCP visit, request they fill out the form, then go to [facility] (only time you do not wait in a long line there). Some [facility] offices provide the same service. ([facility] and [place] have this service; if you live in another [facility], you may check with them as well). You need space to open car doors to position yourself properly with walker to get in and out of your car safely; some parking spaces are  practically on top of each other and do not give you enough room.    SPECIAL  INSTRUCTIONS: per social work, pre-op plan is [facility]     You received a drug called Toradol which is an Anti Inflammatory at: 9:30AM  If you are allowed to take Anti inflammatories (like Motrin, Aleve or Ibuprofen, Advil), do not take for six hours after Toradol dose.  Please report any suspected allergic reactions or bleeding issues to your doctor         You have been given a prescription for oxycodone  Norco was given to you at: 11AM  Next dose no sooner than four hours after first dose  Take this medication as directed    Oxycodone is a Narcotic and can be constipating or upset your stomach  Don't take oxycodone on an empty stomach  Drink plenty of water  Alcoholic beverages should be avoided while taking narcotics

## 2024-10-16 NOTE — PHYSICAL THERAPY NOTE
PHYSICAL THERAPY EVALUATION - INPATIENT     Room Number: [facility]/ [facility] Pool  Evaluation Date: 10/16/2024  Type of Evaluation: Initial  Physician Order: PT Eval and Treat    Presenting Problem: s/p L TKA 10/16     Reason for Therapy: Mobility Dysfunction and Discharge Planning    PHYSICAL THERAPY ASSESSMENT   Patient is a 64 year old female admitted 10/16/2024 for planned procedure s/p L TKA.  Prior to admission, patient's baseline is indep.  Patient is currently functioning below baseline with gait and stair negotiation.  Patient is requiring contact guard assist as a result of the following impairments: decreased functional strength and decreased muscular endurance consistent with post op status.       Patient continued to demonstrate extensor lag and impaired quad contraction during therapy assessment this afternoon. Educated on use of KI during gait and stair training. Discussed with RN who plans to reach out to ortho.     Patient will benefit from continued skilled PT Services at discharge to promote prior level of function.  Anticipate patient will return home with home health PT.    Patient has met all skilled IPPT goals at this time. Patient will be discharged from Physical Therapy services.  Please re-order if a new functional limitation presents during this admission.      PLAN DURING HOSPITALIZATION  Nursing Mobility Recommendation : 1 Assist  PT Device Recommendation: Rolling walker                  HOME SITUATION  Type of Home: House  Home Layout: Two level;Able to live on main level  Stairs to Enter : 3   Railing: Yes              Lives With: Spouse    Drives: Yes   Patient Regularly Uses: Glasses;Rolling walker      Prior Level of [place]: indep, reports hx of R TKA and is familiar with the process. Has a RW at home from last surgery.     SUBJECTIVE  \"Oh I'm glad to see you again\"     OBJECTIVE  Precautions: None  Fall Risk: Standard fall risk    WEIGHT BEARING RESTRICTION  L Lower  Extremity: Weight Bearing as Tolerated    PAIN ASSESSMENT  Ratin          COGNITION  Overall Cognitive Status:  WFL - within functional limits    RANGE OF MOTION AND STRENGTH ASSESSMENT  Upper extremity ROM and strength are within functional limits     Lower extremity ROM is within functional limits     Lower extremity strength is within functional limits except LLE - quad strength below functional limits unable to perform LAQ or SLR continued to demonstrate extensor lag during assessment during the afternoon     BALANCE  Static Sitting: Good  Dynamic Sitting: Good  Static Standing: Fair -  Dynamic Standing: Fair -    ADDITIONAL TESTS                                    ACTIVITY TOLERANCE                         O2 WALK       NEUROLOGICAL FINDINGS                        AM-PAC '6-Clicks' INPATIENT SHORT FORM - BASIC MOBILITY  How much difficulty does the patient currently have...  Patient Difficulty: Turning over in bed (including adjusting bedclothes, sheets and blankets)?: None   Patient Difficulty: Sitting down on and standing up from a chair with arms (e.g., wheelchair, bedside commode, etc.): A Little   Patient Difficulty: Moving from lying on back to sitting on the side of the bed?: None   How much help from another person does the patient currently need...   Help from Another: Moving to and from a bed to a chair (including a wheelchair)?: A Little   Help from Another: Need to walk in hospital room?: A Little   Help from Another: Climbing 3-5 steps with a railing?: A Little     AM-PAC Score:  Raw Score: 20   Approx Degree of Impairment: 35.83%   Standardized Score (AM-PAC Scale): 47.67   CMS Modifier (G-Code): CJ    FUNCTIONAL ABILITY STATUS  Gait Assessment   Functional Mobility/Gait Assessment  Gait Assistance: Contact guard assist  Distance (ft): 150  Assistive Device: Rolling walker  Pattern:  (step to leading L with KI)  Stairs: Stairs  How Many Stairs:  (6 half steps, 4 regular steps)  Device: 2  Rails  Assist: Contact guard assist  Pattern: Ascend and Descend  Ascend and Descend : Step to    Skilled Therapy Provided     Bed Mobility:  Rolling: mod I   Supine to sit: mod I    Sit to supine: mod I      Transfer Mobility:  Sit to stand: CGA   Stand to sit: CGA  Gait = CGA    Therapist's Comments: Discussed case with RN prior to session initiation. Pt agreeable to participation in therapy. Initial attempt pt able to perform ankle pumps and heel slides but unable to perform SAQ or SLR. Still unable to perform later this afternoon. Educated on use of KI for support with cuing on donning for weight bearing mobility and doffing at rest. Educated on transfer technique STS with majority of weight through RLE - able to participate in weight shifts with KI - did demonstrate buckling with full wb but demonstrated sufficient UE strength to offload. Progressed to gait training cuing for step to pattern leading with LLE and using UE to offload, x1 episode of buckling through brace pt able to self correct and apply cuing for shorter step length. Educated on stair training, initially required tactile cuing through brace for TKE, able to progress on second trial to perform without external assist.      Exercise/Education Provided:  Bed mobility  Body mechanics  Functional activity tolerated  Gait training  ROM  Strengthening  Transfer training    Patient End of Session: In bed;Needs met;Call light within reach;RN aware of session/findings;All patient questions and concerns addressed;Hospital anti-slip socks;Family present      Patient Evaluation Complexity Level:  History Low - no personal factors and/or co-morbidities   Examination of body systems Low -  addressing 1-2 elements   Clinical Presentation Low- Stable   Clinical Decision Making Low Complexity       PT Session Time: 25 minutes  Gait Training: 15 minutes  Therapeutic Activity:  minutes  Neuromuscular Re-education:  minutes  Therapeutic Exercise:  minutes

## 2024-10-16 NOTE — CM/SW NOTE
Sade met with pt to discuss plans for HHC.  [facility]  Pt's  insurance shows as [employer] primary- pt states her  is retired.  SW reached out to [facility] and they verified that pt's Medicare is primary and can accept.  Sade sent message to Insurance Verification team.    Ingrid Victor LCSW  /Discharge Planner    SADE met with pt and her - Provided contact info with quality data to them for [facility].  Await update on dc today.    Ingrid Victor LCSW  /Discharge Planner

## 2024-10-16 NOTE — ANESTHESIA PREPROCEDURE EVALUATION
PRE-OP EVALUATION    Patient Name: Criss Murphy    Admit Diagnosis: PRIMARY OSTEOARTHRITIS PF LEFT KNEE    Pre-op Diagnosis: PRIMARY OSTEOARTHRITIS PF LEFT KNEE    LEFT TOTAL KNEE ARTHROPLASTY    Anesthesia Procedure: LEFT TOTAL KNEE ARTHROPLASTY (Left)    Surgeons and Role:     * Kiet Greene MD - Primary    Pre-op vitals reviewed.  Temp: 98.7 °F (37.1 °C)  Pulse: 62  Resp: 16  BP: 133/59  SpO2: 100 %  Body mass index is 21.34 kg/m².    Current medications reviewed.  Hospital Medications:   [Transfer Hold] acetaminophen (Tylenol Extra Strength) tab 1,000 mg  1,000 mg Oral Once    lactated ringers infusion   Intravenous Continuous    [Transfer Hold] tranexamic acid in sodium chloride 0.7% (Cyklokapron) 1000 mg/100mL infusion premix 1,000 mg  1,000 mg Intravenous Once    [COMPLETED] ceFAZolin (Ancef) 2g in 10mL IV syringe premix  2 g Intravenous Once    clonidine/epinephrine/ropivacaine/ketorolac in 0.9% NaCl 60 mL pain cocktail syringe for knee arthroplasty   Infiltration Once (Intra-Op)       Outpatient Medications:   Prescriptions Prior to Admission[1]    Allergies: Peanuts, Tree nuts, Cat hair extract, Dander, Pcn [penicillins], and Seasonal      Anesthesia Evaluation    Patient summary reviewed.    Anesthetic Complications           GI/Hepatic/Renal    Negative GI/hepatic/renal ROS.                             Cardiovascular    Negative cardiovascular ROS.  ECG reviewed.  Exercise tolerance: good     MET: >4                                           Endo/Other                           (+) arthritis       Pulmonary      (+) asthma                     Neuro/Psych                              Chart reviewed 3/14/23        Past Surgical History:   Procedure Laterality Date    Adenoidectomy      Colonoscopy      Other surgical history Left     knee sx for torn cartilage    Other surgical history Right     Hammer toe sx    Other surgical history      Nasal sx    Other surgical history      dental  implants    Other surgical history      bladder sling     Social History     Socioeconomic History    Marital status:    Occupational History    Occupation:    Tobacco Use    Smoking status: Never    Smokeless tobacco: Never   Vaping Use    Vaping status: Never Used   Substance and Sexual Activity    Alcohol use: Never    Drug use: Never     History   Drug Use Unknown     Available pre-op labs reviewed.  Lab Results   Component Value Date    WBC 4.5 09/25/2024    RBC 3.73 (L) 09/25/2024    HGB 11.6 (L) 09/25/2024    HCT 35.8 09/25/2024    MCV 96.0 09/25/2024    MCH 31.1 09/25/2024    MCHC 32.4 09/25/2024    RDW 13.8 09/25/2024    .0 09/25/2024     Lab Results   Component Value Date     09/25/2024    K 4.6 09/25/2024     09/25/2024    CO2 29.0 09/25/2024    BUN 15 09/25/2024    CREATSERUM 0.93 09/25/2024    GLU 88 09/25/2024    CA 10.3 09/25/2024     Lab Results   Component Value Date    INR 0.94 09/25/2024         Airway      Mallampati: I       Cardiovascular    Cardiovascular exam normal.         Dental      Dental appliance(s): upper dentures and lower dentures       Pulmonary    Pulmonary exam normal.                 Other findings              ASA: 2   Plan: spinal  NPO status verified and patient meets guidelines.    Post-procedure pain management plan discussed with surgeon and patient.  Surgeon requests: regional block    Plan/risks discussed with: patient                Present on Admission:  **None**             [1]   Medications Prior to Admission   Medication Sig Dispense Refill Last Dose/Taking    Estradiol-Norethindrone Acet (COMBIPATCH) 0.05-0.14 MG/DAY Transdermal Patch Biweekly Place 1 patch onto the skin once a week. 12 patch 1 Past Week    BUPROPION  MG Oral Tablet 12 Hr TAKE 1 TABLET DAILY 90 tablet 0 10/15/2024    Multiple Vitamins-Minerals (MULTI-VITAMIN/MINERALS) Oral Tab Take 1 tablet by mouth daily.   Past Week    ferrous sulfate 325 (65 FE) MG  Oral Tab EC Take 1 tablet (325 mg total) by mouth daily as needed. Before donating blood   Past Week    Wheat Dextrin (BENEFIBER ON THE GO) Oral Powder Take 1 packet by mouth daily.   Past Week    DICLOFENAC 75 MG Oral Tab EC TAKE 1 TABLET DAILY 90 tablet 1 10/9/2024    nystatin [id number] units Oral Tab Take 1 tablet (500,000 Units total) by mouth daily. 90 tablet 3 Past Week    albuterol (PROAIR HFA) 108 (90 Base) MCG/ACT Inhalation Aero Soln Inhale 2 puffs into the lungs every 6 (six) hours as needed for Wheezing. 1 each 5 Taking As Needed    Azelastine-Fluticasone 137-50 MCG/ACT Nasal Suspension 1 spray by Nasal route 2 (two) times daily.   10/15/2024    albuterol (2.5 MG/3ML) 0.083% Inhalation Nebu Soln Take 3 mL (2.5 mg total) by nebulization 4 (four) times daily. (Patient taking differently: Take 3 mL (2.5 mg total) by nebulization every 6 (six) hours as needed for Wheezing or Shortness of Breath.) 75 mL 2 More than a month    budesonide 0.5 MG/2ML Inhalation Suspension Take 2 mL (0.5 mg total) by nebulization 2 (two) times daily. 30 ampule 0 More than a month

## 2024-10-16 NOTE — PHYSICAL THERAPY NOTE
PT eval att'd, cont to demonstrate significant extensor lag with minimal quad recruitment. Will hold and re-attempt as appropriate.

## 2024-10-16 NOTE — ANESTHESIA POSTPROCEDURE EVALUATION
[facility]    Criss Murphy Patient Status:  Hospital Outpatient Surgery   Age/Gender 64 year old female MRN [id number]   Location [facility] POST ANESTHESIA CARE UNIT Attending Kiet Greene MD   Hosp Day # 0 PCP Neftaly Rangel MD       Anesthesia Post-op Note    LEFT TOTAL KNEE ARTHROPLASTY    Procedure Summary       Date: 10/16/24 Room / Location:  MAIN OR 05 /  MAIN OR    Anesthesia Start: 0839 Anesthesia Stop: 0951    Procedure: LEFT TOTAL KNEE ARTHROPLASTY (Left) Diagnosis: (PRIMARY OSTEOARTHRITIS PF LEFT KNEE)    Surgeons: Kiet Greene MD Anesthesiologist: Syed Morgan MD    Anesthesia Type: spinal ASA Status: 2            Anesthesia Type: spinal    Vitals Value Taken Time   /55 10/16/24 1005   Temp 98 °F (36.7 °C) 10/16/24 0950   Pulse 67 10/16/24 1008   Resp 14 10/16/24 1008   SpO2 99 % 10/16/24 1008   Vitals shown include unfiled device data.    Patient Location: PACU    Anesthesia Type: spinal    Airway Patency: patent    Postop Pain Control: adequate    Mental Status: mildly sedated but able to meaningfully participate in the post-anesthesia evaluation    Nausea/Vomiting: none    Cardiopulmonary/Hydration status: stable euvolemic    Complications: no apparent anesthesia related complications    Postop vital signs: stable    Dental Exam: Unchanged from Preop    Patient to be discharged home when criteria met.

## 2024-10-16 NOTE — INTERVAL H&P NOTE
Pre-op Diagnosis: PRIMARY OSTEOARTHRITIS PF LEFT KNEE    The above referenced H&P was reviewed by Kiet Greene MD on 10/16/2024, the patient was examined and no significant changes have occurred in the patient's condition since the H&P was performed.  I discussed with the patient and/or legal representative the potential benefits, risks and side effects of this procedure; the likelihood of the patient achieving goals; and potential problems that might occur during recuperation.  I discussed reasonable alternatives to the procedure, including risks, benefits and side effects related to the alternatives and risks related to not receiving this procedure.  We will proceed with procedure as planned.

## 2024-10-16 NOTE — CM/SW NOTE
10/16/24 1000   CM/SW Referral Data   Referral Source Physician   Reason for Referral Discharge planning   Informant EMR;Clinical Staff Member     Pt s/p L TKA.  Pre-op plan [facility].  Await therapy recs.    Ingrid Victor LCSW  /Discharge Planner

## 2024-10-16 NOTE — OPERATIVE REPORT
DATE OF PROCEDURE:  10/16/2024  PREOPERATIVE DIAGNOSIS: Left knee osteoarthritis.   POSTOPERATIVE DIAGNOSIS: Left knee osteoarthritis.   PROCEDURE PERFORMED: Cemented left total knee arthroplasty.   SURGEON:  Kiet Greene M.D.  FIRST ASSISTANT:  Anibal Morillo PA-C.   SECOND ASSISTANT:  Rock Mann  ANESTHESIA: Spinal plus femoral nerve block.   INDICATIONS: The patient has severe knee osteoarthritis that has not responded to conservative treatment including antiinflammatories and injections.  The patient's pain has limited activities of daily living including ambulation and exercise.    DESCRIPTION OF PROCEDURE: The patient was brought to the operating room and under spinal anesthetic, the left lower extremity was sterilely prepped and draped.  Preoperative antibiotics had been administered.   A high thigh tourniquet was inflated to 300.  It was medically necessary for the presence of the assistants listed for safe patient care.  This included positioning of the leg, holding of retractors to protect the underlying neurovascular structures and soft tissues.  It was required for the assistants to hold retractors to protect soft tissues while the primary surgeon made the bone cuts on the femur, the tibia, and the patella.  The assistants also held the leg stable and positioned while the primary surgeon made the approach, and the bone cuts, and affixed the guides and later the components to the bones.  An anterior incision was made, medial and lateral flaps were created. A medial parapatellar arthrotomy was created. The patella was everted, the knee was flexed.  Severe arthritic changes with areas of eburnated bone were noted.  A drill was placed in the distal femur and a 6-degree 10 mm cut was made.   The [employer] rotating platform system was used.  Sizing was performed and a size 5 cutting block was selected to make anterior, posterior, chamfer and box cuts for a cruciate-sacrificing knee. Attention was turned to  the tibia. An external alignment guide was utilized to take 10 mm off the less involved lateral side. Sizing was performed and a size 5 fit well. There were equal medial and lateral gaps when checked with a spacer.  Equal flexion and extension gaps were obtained. The stem cut was made for the tibia and 10 mm was taken off the posterior patella. Sizing was performed and a size 38 patella was selected. Three drill holes were placed.   Trial was performed with a 5 femur, 5 tibia, 7 mm insert and 38 mm patella. This gave good varus and valgus stability, good flexion and extension, good tracking of the patella. Drill holes were made in the distal femoral condyles in the trial template. Trial components were removed. Bony surfaces were irrigated and dried. Final components were precoated with cement which had been mixed under vacuum conditions. The bony surfaces were precoated as well. Components were impacted into place and excess cement removed. The knee was held in extension while the cement hardened.   The tourniquet was let down, bleeders were cauterized.  Lavage was performed. The arthrotomy was closed with a barbed running suture. Subcutaneous tissue was closed after further irrigation. This was closed with inverted 2-0 Vicryl for the subcutaneous tissue and staples for the skin. An aquacell dressing plus gauze covering was applied.  A lightly compressive dressing from toe to groin was applied. Estimated blood loss was 150 mL. There were no complications. There were no specimens.   The patient was brought to the PACU in stable condition.

## 2024-10-16 NOTE — ANESTHESIA PROCEDURE NOTES
Regional Block    Date/Time: 10/16/2024 8:36 AM    Performed by: Syed Morgan MD  Authorized by: Syed Morgan MD      General Information and Staff    Start Time:  10/16/2024 8:36 AM  End Time:  10/16/2024 8:39 AM  Anesthesiologist:  Syed Morgan MD  Performed by:  Anesthesiologist  Patient Location:  OR    Block Placement: Post Induction  Site Identification: real time ultrasound guided and image stored and retrievable    Block site/laterality marked before start: site marked  Reason for Block: at surgeon's request and post-op pain management    Preanesthetic Checklist: 2 patient identifers, IV checked, site marked, risks and benefits discussed, monitors and equipment checked, pre-op evaluation, timeout performed, anesthesia consent, sterile technique used, no prohibitive neurological deficits and no local skin infection at insertion site      Procedure Details    Patient Position:  Supine  Prep: ChloraPrep    Monitoring:  Cardiac monitor, continuous pulse ox and blood pressure cuff  Block Type:  Adductor canal  Laterality:  Left  Injection Technique:  Single-shot    Needle    Needle Type:  Short-bevel and echogenic  Needle Gauge:  20 G  Needle Length:  110 mm  Needle Localization:  Ultrasound guidance  Reason for Ultrasound Use: appropriate spread of the medication was noted in real time and no ultrasound evidence of intravascular and/or intraneural injection            Assessment    Injection Assessment:  Good spread noted, negative resistance, negative aspiration for heme, incremental injection and low pressure  Heart Rate Change: No    - Patient tolerated block procedure well without evidence of immediate block related complications.     Medications  10/16/2024 8:36 AM      Additional Comments    Medication:  Ropivacaine 0.375% 20mL with PF dexamethasone 2mg

## 2024-10-18 ENCOUNTER — TELEPHONE (OUTPATIENT)
Dept: PHYSICAL THERAPY | Facility: HOSPITAL | Age: 64
End: 2024-10-18

## 2024-10-21 ENCOUNTER — APPOINTMENT (OUTPATIENT)
Dept: PHYSICAL THERAPY | Age: 64
End: 2024-10-21
Attending: ORTHOPAEDIC SURGERY
Payer: MEDICARE

## 2024-10-24 ENCOUNTER — APPOINTMENT (OUTPATIENT)
Dept: PHYSICAL THERAPY | Age: 64
End: 2024-10-24
Attending: ORTHOPAEDIC SURGERY
Payer: MEDICARE

## 2024-10-29 ENCOUNTER — APPOINTMENT (OUTPATIENT)
Dept: PHYSICAL THERAPY | Age: 64
End: 2024-10-29
Attending: ORTHOPAEDIC SURGERY
Payer: MEDICARE

## 2024-10-31 ENCOUNTER — APPOINTMENT (OUTPATIENT)
Dept: PHYSICAL THERAPY | Age: 64
End: 2024-10-31
Attending: ORTHOPAEDIC SURGERY
Payer: MEDICARE

## 2024-11-04 ENCOUNTER — APPOINTMENT (OUTPATIENT)
Dept: PHYSICAL THERAPY | Age: 64
End: 2024-11-04
Attending: ORTHOPAEDIC SURGERY
Payer: COMMERCIAL

## 2024-11-04 ENCOUNTER — TELEPHONE (OUTPATIENT)
Dept: PHYSICAL THERAPY | Facility: HOSPITAL | Age: 64
End: 2024-11-04

## 2024-11-07 ENCOUNTER — APPOINTMENT (OUTPATIENT)
Dept: PHYSICAL THERAPY | Age: 64
End: 2024-11-07
Attending: ORTHOPAEDIC SURGERY
Payer: COMMERCIAL

## 2024-11-11 ENCOUNTER — OFFICE VISIT (OUTPATIENT)
Dept: PHYSICAL THERAPY | Age: 64
End: 2024-11-11
Attending: ORTHOPAEDIC SURGERY
Payer: COMMERCIAL

## 2024-11-11 DIAGNOSIS — M17.12 PRIMARY OSTEOARTHRITIS OF LEFT KNEE: Primary | ICD-10-CM

## 2024-11-11 PROCEDURE — 97161 PT EVAL LOW COMPLEX 20 MIN: CPT

## 2024-11-11 PROCEDURE — 97110 THERAPEUTIC EXERCISES: CPT

## 2024-11-11 NOTE — PROGRESS NOTES
POST-OP KNEE EVALUATION:     Diagnosis:   L TKR      Referring Provider: Kiet Greene  Date of Evaluation:    11/11/2024    Precautions:  None Next MD visit:   12/2/2024  Date of Surgery: 10/16/2024     PATIENT SUMMARY   Criss Murphy is a 64 year old female who presents to therapy today s/p L THR on 10/16/2024 with complaints of post-op soreness and stiffness. Stiches out last week, feel like it is healing well. Less anxious than last TKR.  Pt describes pain level current 5/10, at best 1/10, at worst 6/10.   Current functional limitations include limited community ambulation, difficulty with stairs and squatting.     Criss describes prior level of function as unrestricted with all ADLs. Pt goals include return to full ADLs.  Past medical history was reviewed with Criss. Significant findings include  has a past medical history of Asthma ([facility]), COVID (12/19/2020), Osteoarthritis, and Visual impairment.  has a past surgical history that includes adenoidectomy; colonoscopy; other surgical history (Left); other surgical history (Right); other surgical history; other surgical history; and other surgical history.     ASSESSMENT  Criss presents to physical therapy evaluation with primary c/o L knee stiffness and weakness. The results of the objective tests and measures show decreased L knee ROM and strength.  Functional deficits include but are not limited to difficulty with community ambulation, difficulty with stairs and squatting.  Signs and symptoms are consistent with diagnosis of L TKR. Pt and PT discussed evaluation findings, pathology, POC and HEP.  Pt voiced understanding and performs HEP correctly without reported pain. Skilled Physical Therapy is medically necessary to address the above impairments and reach functional goals.     OBJECTIVE:   Observation/Skin: Incision healing well   Palpation: non tender throughout the L LE  Edema: at knee jt line R: 34.5 cm, L 37 cm  Sensation: Light  touch sensation in tact throughout the B LE dermatomes    AROM: (* denotes performed with pain)   Knee    Flexion: R 120; L 100   Extension: R 0; L -5     PROM: (* denotes performed with pain)   Knee    Flexion: R 126; L 112   Extension: R 0; L -3     Patellar Mobility/Accessory motion: Good patella mobility    Flexibility:   Hamstrings: R Mild tightness; L Moderate tightness  Gastroc-soleus: R Mild tightness; L Moderate tightness  Piriformis: R NT; L NT  Quads: R Mild tightness; L Moderate tightness    Strength/MMT: (* denotes performed with pain)  Hip Knee   Flexion: R 5/5; L 5/5  Extension: R 4+/5; L 4+/5  Abduction: R 4+/5; L 4+/5  ER: R 4+/5; L 4+/5 Flexion: R 5/5; L 5/5  Extension: R 5/5; L 4+/5        Balance: SLS: R 20 sec, L 3 sec     Gait: pt ambulates on level ground with normal mechanics.     Today’s Treatment and Response:   Pt education was provided on exam findings, treatment diagnosis, treatment plan, expectations, and prognosis. Pt was also provided recommendations for activity modifications, possible soreness after evaluation, and modalities as needed [ice/heat]. Patient was instructed in and issued a HEP for: QS,SAQ,SLR,heel slides,side stepping, ext stretch    Charges: PT Eval Low Complexity, therapeutic ex       Total Timed Treatment: 13 min     Total Treatment Time: 40 min     Based on clinical rationale and outcome measures, this evaluation involved Low Complexity decision making due to 1-2 personal factors/comorbidities, 3 body structures involved/activity limitations, and evolving symptoms including changing pain levels.  PLAN OF CARE:    Goals: (To be met in 20 visits)   Pt will improve knee extension ROM to 0 deg to allow proper heel strike during gait and terminal knee extension in stance  Pt will improve knee AROM flexion to >120 degrees to improve ability to perform squats to retrieve objects off the floor.  Pt will improve quad strength to 5/5 to ascend 1 flight of stairs reciprocally  without UE assist  Pt will increase hip and knee strength to grossly 4+/5 to be able to get up and down from the floor safely  Pt will demonstrate increased hip ER/ABD strength to 5/5 to perform stepping and squatting activities without excessive femoral IR/ADD  Pt will improve SLS to >20s to improve safety and independence with gait on uneven surfaces such as grass  Pt will be independent and compliant with comprehensive HEP to maintain progress achieved in PT    Frequency / Duration: Patient will be seen for 2 x/week or a total of 20 visits over a 90 day period.  Treatment will include: Manual Therapy, Neuromuscular Re-education, Therapeutic Exercise, and Home Exercise Program instruction    Education or treatment limitation: None  Rehab Potential:good    LEFS Score  LEFS Score: (Patient-Rptd) 43.75 % (11/11/2024  7:21 AM)      Patient/Family/Caregiver was advised of these findings, precautions, and treatment options and has agreed to actively participate in planning and for this course of care.    Thank you for your referral. Please co-sign or sign and return this letter via fax as soon as possible to [phone]. If you have any questions, please contact me at Dept: [phone]    Sincerely,  Electronically signed by therapist: Miles Madera, PT  Physician's certification required: Yes  I certify the need for these services furnished under this plan of treatment and while under my care.    X___________________________________________________ Date____________________    Certification From: 11/11/2024  To:2/9/2025

## 2024-11-14 ENCOUNTER — OFFICE VISIT (OUTPATIENT)
Dept: PHYSICAL THERAPY | Age: 64
End: 2024-11-14
Attending: ORTHOPAEDIC SURGERY
Payer: COMMERCIAL

## 2024-11-14 PROCEDURE — 97110 THERAPEUTIC EXERCISES: CPT

## 2024-11-14 NOTE — PROGRESS NOTES
Diagnosis:   L TKR      Referring Provider: Kiet Greene  Date of Evaluation:    11/11/2024    Precautions:  None Next MD visit:   none scheduled  Date of Surgery: 10/16/2024   Insurance Primary/Secondary: MEDICARE / [employer]     # Auth Visits: Med necessity            Subjective: Sore, but doing better overall.     Pain: 4/10      Objective:   AROM: (* denotes performed with pain)   Knee    Flexion: R 120; L 100   Extension: R 0; L -5      PROM: (* denotes performed with pain)   Knee    Flexion: R 126; L 112   Extension: R 0; L -3          Assessment: Good mobility overall. Tolerates all strength activity well.       Goals:   (To be met in 20 visits)   Pt will improve knee extension ROM to 0 deg to allow proper heel strike during gait and terminal knee extension in stance  Pt will improve knee AROM flexion to >120 degrees to improve ability to perform squats to retrieve objects off the floor.  Pt will improve quad strength to 5/5 to ascend 1 flight of stairs reciprocally without UE assist  Pt will increase hip and knee strength to grossly 4+/5 to be able to get up and down from the floor safely  Pt will demonstrate increased hip ER/ABD strength to 5/5 to perform stepping and squatting activities without excessive femoral IR/ADD  Pt will improve SLS to >20s to improve safety and independence with gait on uneven surfaces such as grass  Pt will be independent and compliant with comprehensive HEP to maintain progress achieved in PT    Plan: Progress strength and proprioception as tolerated  Date: 11/14/2024  TX#: 2/10 Date:                 TX#: 3/ Date:                 TX#: 4/ Date:                 TX#: 5/ Date:   Tx#: 6/   THERAPEUTIC EX 38'  Nu-step Lv5 10'  Slant board L1 3x30\"  Gastroc stretch 3x30\"  Hamstring stretch 3x30\"  Ext stretch 3'  Knee PROM 6'  SAQ 3x10  SLR 3x10  SL hip flexor stretch 3x30\"  Clamshells 3x10  Sit to stand on table 22\" 3x10  Step ups 6\" 3x10  Step downs 4\" 3x10       MANUAL THERAPY  6'  Patella mobs 2' ea                     HEP: QS,SAQ,SLR,heel slides,side stepping, ext stretch     Charges: therapeutic ex 3       Total Timed Treatment: 44 min  Total Treatment Time: 45 min

## 2024-11-18 ENCOUNTER — OFFICE VISIT (OUTPATIENT)
Dept: PHYSICAL THERAPY | Age: 64
End: 2024-11-18
Attending: ORTHOPAEDIC SURGERY
Payer: COMMERCIAL

## 2024-11-18 PROCEDURE — 97110 THERAPEUTIC EXERCISES: CPT

## 2024-11-18 NOTE — PROGRESS NOTES
Diagnosis:   L TKR      Referring Provider: Kiet Greene  Date of Evaluation:    11/11/2024    Precautions:  None Next MD visit:   none scheduled  Date of Surgery: 10/16/2024   Insurance Primary/Secondary: MEDICARE / [employer]     # Auth Visits: Med necessity            Subjective: Feels more swollen and tight today. Increased pain over the weekend too.     Pain: 4/10      Objective:   AROM: (* denotes performed with pain)   Knee    Flexion: R 120; L 100   Extension: R 0; L -10      PROM: (* denotes performed with pain)   Knee    Flexion: R 126; L 112   Extension: R 0; L -5          Assessment: Increased stiffness into extension today. Suggested increasing extension stretch at home.       Goals:   (To be met in 20 visits)   Pt will improve knee extension ROM to 0 deg to allow proper heel strike during gait and terminal knee extension in stance  Pt will improve knee AROM flexion to >120 degrees to improve ability to perform squats to retrieve objects off the floor.  Pt will improve quad strength to 5/5 to ascend 1 flight of stairs reciprocally without UE assist  Pt will increase hip and knee strength to grossly 4+/5 to be able to get up and down from the floor safely  Pt will demonstrate increased hip ER/ABD strength to 5/5 to perform stepping and squatting activities without excessive femoral IR/ADD  Pt will improve SLS to >20s to improve safety and independence with gait on uneven surfaces such as grass  Pt will be independent and compliant with comprehensive HEP to maintain progress achieved in PT    Plan: Progress strength and proprioception as tolerated  Date: 11/14/2024  TX#: 2/10 Date:  11/18/2024               TX#: 3/10 Date:                 TX#: 4/ Date:                 TX#: 5/ Date:   Tx#: 6/   THERAPEUTIC EX 38'  Nu-step Lv5 10'  Slant board L1 3x30\"  Gastroc stretch 3x30\"  Hamstring stretch 3x30\"  Ext stretch 3'  Knee PROM 6'  SAQ 3x10  SLR 3x10  SL hip flexor stretch 3x30\"  Clamshells 3x10  Sit to stand  on table 22\" 3x10  Step ups 6\" 3x10  Step downs 4\" 3x10 THERAPEUTIC EX 38'  Nu-step Lv5 10'  Slant board L1 3x30\"  Gastroc stretch 3x30\"  Hamstring stretch 3x30\"  Ext stretch 3'  Knee PROM 6'  SAQ 3x10  SLR 3x10  SL hip flexor stretch 3x30\"  Clamshells 3x10  Sit to stand on table 22\" 3x10  Step ups 6\" 3x10  Step downs 4\" 3x10  Lateral stepping red tband 15'x6  Shuttle squats 56 3x10      MANUAL THERAPY 6'  Patella mobs 2' ea MANUAL THERAPY 6'  Patella mobs 2' ea                    HEP: QS,SAQ,SLR,heel slides,side stepping, ext stretch     Charges: therapeutic ex 3       Total Timed Treatment: 44 min  Total Treatment Time: 45 min

## 2024-11-20 ENCOUNTER — TELEPHONE (OUTPATIENT)
Dept: FAMILY MEDICINE CLINIC | Facility: CLINIC | Age: 64
End: 2024-11-20

## 2024-11-20 NOTE — TELEPHONE ENCOUNTER
Per Epic Reminder:  Mammogram done 6-14-24 at [facility], needs 6 month follow up Right breast ultrasound.     Spoke with patient who advised she does have appointment scheduled for above imaging, was driving at time of call so could not give me the exact date. Agrees to contact the office if she has any issues getting testing done.

## 2024-11-21 ENCOUNTER — OFFICE VISIT (OUTPATIENT)
Dept: PHYSICAL THERAPY | Age: 64
End: 2024-11-21
Attending: ORTHOPAEDIC SURGERY
Payer: COMMERCIAL

## 2024-11-21 PROCEDURE — 97110 THERAPEUTIC EXERCISES: CPT

## 2024-11-21 NOTE — PROGRESS NOTES
Diagnosis:   L TKR      Referring Provider: Keit Greene  Date of Evaluation:    11/11/2024    Precautions:  None Next MD visit:   none scheduled  Date of Surgery: 10/16/2024   Insurance Primary/Secondary: MEDICARE / [employer]     # Auth Visits: Med necessity            Subjective: Trying to go without pain meds today.    Pain: 4/10      Objective:   AROM: (* denotes performed with pain)   Knee    Flexion: R 120; L 100   Extension: R 0; L -5      PROM: (* denotes performed with pain)   Knee    Flexion: R 126; L 112   Extension: R 0; L -3          Assessment: Good mobility and improving strength. Increased height of step ups to match improving strength.       Goals:   (To be met in 20 visits)   Pt will improve knee extension ROM to 0 deg to allow proper heel strike during gait and terminal knee extension in stance  Pt will improve knee AROM flexion to >120 degrees to improve ability to perform squats to retrieve objects off the floor.  Pt will improve quad strength to 5/5 to ascend 1 flight of stairs reciprocally without UE assist  Pt will increase hip and knee strength to grossly 4+/5 to be able to get up and down from the floor safely  Pt will demonstrate increased hip ER/ABD strength to 5/5 to perform stepping and squatting activities without excessive femoral IR/ADD  Pt will improve SLS to >20s to improve safety and independence with gait on uneven surfaces such as grass  Pt will be independent and compliant with comprehensive HEP to maintain progress achieved in PT    Plan: Progress strength and proprioception as tolerated  Date: 11/14/2024  TX#: 2/10 Date:  11/18/2024               TX#: 3/10 Date: 11/21/2024                TX#: 4/10 Date:                 TX#: 5/ Date:   Tx#: 6/   THERAPEUTIC EX 38'  Nu-step Lv5 10'  Slant board L1 3x30\"  Gastroc stretch 3x30\"  Hamstring stretch 3x30\"  Ext stretch 3'  Knee PROM 6'  SAQ 3x10  SLR 3x10  SL hip flexor stretch 3x30\"  Clamshells 3x10  Sit to stand on table 22\"  3x10  Step ups 6\" 3x10  Step downs 4\" 3x10 THERAPEUTIC EX 38'  Nu-step Lv5 10'  Slant board L1 3x30\"  Gastroc stretch 3x30\"  Hamstring stretch 3x30\"  Ext stretch 3'  Knee PROM 6'  SAQ 3x10  SLR 3x10  SL hip flexor stretch 3x30\"  Clamshells 3x10  Sit to stand on table 22\" 3x10  Step ups 6\" 3x10  Step downs 4\" 3x10  Lateral stepping red tband 15'x6  Shuttle squats 56 3x10 THERAPEUTIC EX 38'  Bike 10'  Slant board L1 3x30\"  Gastroc stretch 3x30\"  Hamstring stretch 3x30\"  Ext stretch 3'  Knee PROM 6'  SAQ 3x10  SLR 3x10  SL hip flexor stretch 3x30\"  Clamshells 3x10  Sit to stand on table 22\" 3x10  Step ups 8\" 3x10  Step downs 6\" 3x10  Lateral stepping red tband 15'x6  Shuttle squats 56 3x10     MANUAL THERAPY 6'  Patella mobs 2' ea MANUAL THERAPY 6'  Patella mobs 2' ea MANUAL THERAPY 6'  Patella mobs 2' ea                   HEP: QS,SAQ,SLR,heel slides,side stepping, ext stretch     Charges: therapeutic ex 3       Total Timed Treatment: 44 min  Total Treatment Time: 45 min

## 2024-11-25 ENCOUNTER — OFFICE VISIT (OUTPATIENT)
Dept: PHYSICAL THERAPY | Age: 64
End: 2024-11-25
Attending: ORTHOPAEDIC SURGERY
Payer: COMMERCIAL

## 2024-11-25 PROCEDURE — 97110 THERAPEUTIC EXERCISES: CPT

## 2024-11-25 PROCEDURE — 97112 NEUROMUSCULAR REEDUCATION: CPT

## 2024-11-25 NOTE — PROGRESS NOTES
Diagnosis:   L TKR      Referring Provider: Kiet Greene  Date of Evaluation:    11/11/2024    Precautions:  None Next MD visit:   none scheduled  Date of Surgery: 10/16/2024   Insurance Primary/Secondary: MEDICARE / [employer]     # Auth Visits: Med necessity            Subjective: Had a busy weekend, a bit fatigued today.     Pain: 4/10      Objective:   AROM: (* denotes performed with pain)   Knee    Flexion: R 120; L 100   Extension: R 0; L -5      PROM: (* denotes performed with pain)   Knee    Flexion: R 126; L 112   Extension: R 0; L -3          Assessment: Good mobility, good strength gains. Progressed balance activity to improve proprioception.       Goals:   (To be met in 20 visits)   Pt will improve knee extension ROM to 0 deg to allow proper heel strike during gait and terminal knee extension in stance  Pt will improve knee AROM flexion to >120 degrees to improve ability to perform squats to retrieve objects off the floor.  Pt will improve quad strength to 5/5 to ascend 1 flight of stairs reciprocally without UE assist  Pt will increase hip and knee strength to grossly 4+/5 to be able to get up and down from the floor safely  Pt will demonstrate increased hip ER/ABD strength to 5/5 to perform stepping and squatting activities without excessive femoral IR/ADD  Pt will improve SLS to >20s to improve safety and independence with gait on uneven surfaces such as grass  Pt will be independent and compliant with comprehensive HEP to maintain progress achieved in PT    Plan: Progress strength and proprioception as tolerated  Date: 11/14/2024  TX#: 2/10 Date:  11/18/2024               TX#: 3/10 Date: 11/21/2024                TX#: 4/10 Date: 11/25/2024                TX#: 5/10 Date:   Tx#: 6/   THERAPEUTIC EX 38'  Nu-step Lv5 10'  Slant board L1 3x30\"  Gastroc stretch 3x30\"  Hamstring stretch 3x30\"  Ext stretch 3'  Knee PROM 6'  SAQ 3x10  SLR 3x10  SL hip flexor stretch 3x30\"  Clamshells 3x10  Sit to stand on table  22\" 3x10  Step ups 6\" 3x10  Step downs 4\" 3x10 THERAPEUTIC EX 38'  Nu-step Lv5 10'  Slant board L1 3x30\"  Gastroc stretch 3x30\"  Hamstring stretch 3x30\"  Ext stretch 3'  Knee PROM 6'  SAQ 3x10  SLR 3x10  SL hip flexor stretch 3x30\"  Clamshells 3x10  Sit to stand on table 22\" 3x10  Step ups 6\" 3x10  Step downs 4\" 3x10  Lateral stepping red tband 15'x6  Shuttle squats 56 3x10 THERAPEUTIC EX 38'  Bike 10'  Slant board L1 3x30\"  Gastroc stretch 3x30\"  Hamstring stretch 3x30\"  Ext stretch 3'  Knee PROM 6'  SAQ 3x10  SLR 3x10  SL hip flexor stretch 3x30\"  Clamshells 3x10  Sit to stand on table 22\" 3x10  Step ups 8\" 3x10  Step downs 6\" 3x10  Lateral stepping red tband 15'x6  Shuttle squats 56 3x10 THERAPEUTIC EX 38'  Bike 10'  Slant board L1 3x30\"  Gastroc stretch 3x30\"  Hamstring stretch 3x30\"  Ext stretch 3'  Knee PROM 6'  SAQ 3x10  SLR 3x10  SL hip flexor stretch 3x30\"  Clamshells 3x10  Sit to stand on table 22\" 3x10  Step ups 8\" 3x10  Step downs 8\" 3x10  Lateral stepping grn tband 15'x6  Shuttle squats 75 3x10    MANUAL THERAPY 6'  Patella mobs 2' ea MANUAL THERAPY 6'  Patella mobs 2' ea MANUAL THERAPY 6'  Patella mobs 2' ea MANUAL THERAPY 4'  Patella mobs 4'       NEURO RE-ED 8'  Lunge to step 360 3x10  Step 360 step ups 3x10  Balance board balance with lateral challenge 3'           HEP: QS,SAQ,SLR,heel slides,side stepping, ext stretch     Charges: therapeutic ex 2, neuro re-ed       Total Timed Treatment: 44 min  Total Treatment Time: 50 min

## 2024-12-02 ENCOUNTER — OFFICE VISIT (OUTPATIENT)
Dept: PHYSICAL THERAPY | Age: 64
End: 2024-12-02
Attending: ORTHOPAEDIC SURGERY
Payer: COMMERCIAL

## 2024-12-02 ENCOUNTER — TELEPHONE (OUTPATIENT)
Dept: PHYSICAL THERAPY | Facility: HOSPITAL | Age: 64
End: 2024-12-02

## 2024-12-02 ENCOUNTER — OFFICE VISIT (OUTPATIENT)
Dept: FAMILY MEDICINE CLINIC | Facility: CLINIC | Age: 64
End: 2024-12-02
Payer: MEDICARE

## 2024-12-02 VITALS
TEMPERATURE: 99 F | OXYGEN SATURATION: 97 % | HEART RATE: 72 BPM | WEIGHT: 116.19 LBS | HEIGHT: 62.5 IN | DIASTOLIC BLOOD PRESSURE: 68 MMHG | SYSTOLIC BLOOD PRESSURE: 106 MMHG | BODY MASS INDEX: 20.85 KG/M2 | RESPIRATION RATE: 19 BRPM

## 2024-12-02 DIAGNOSIS — J02.9 SORE THROAT: Primary | ICD-10-CM

## 2024-12-02 DIAGNOSIS — J02.0 STREP THROAT: ICD-10-CM

## 2024-12-02 LAB
CONTROL LINE PRESENT WITH A CLEAR BACKGROUND (YES/NO): YES YES/NO
KIT LOT #: ABNORMAL NUMERIC
STREP GRP A CUL-SCR: POSITIVE

## 2024-12-02 PROCEDURE — 97110 THERAPEUTIC EXERCISES: CPT

## 2024-12-02 PROCEDURE — 97112 NEUROMUSCULAR REEDUCATION: CPT

## 2024-12-02 RX ORDER — AZITHROMYCIN 250 MG/1
TABLET, FILM COATED ORAL
Qty: 6 TABLET | Refills: 0 | Status: SHIPPED | OUTPATIENT
Start: 2024-12-02 | End: 2024-12-07

## 2024-12-02 RX ORDER — EPINEPHRINE 0.3 MG/.3ML
INJECTION SUBCUTANEOUS
COMMUNITY
Start: 2024-08-30

## 2024-12-02 NOTE — PROGRESS NOTES
HPI:   Criss is a 64 yr. Old female here today for cough, congestion, and sore throat x 6 days.          Current Outpatient Medications   Medication Sig Dispense Refill    EPINEPHrine 0.3 MG/0.3ML Injection Solution Auto-injector       azithromycin 250 MG Oral Tab Take 2 tablets (500 mg total) by mouth daily for 1 day, THEN 1 tablet (250 mg total) daily for 4 days. 6 tablet 0    acetaminophen 500 MG Oral Tab Take 2 tablets (1,000 mg total) by mouth 3 (three) times daily. 90 tablet 0    ondansetron (ZOFRAN) 4 mg tablet Take 1 tablet (4 mg total) by mouth every 8 (eight) hours as needed for Nausea. 30 tablet 1    Estradiol-Norethindrone Acet (COMBIPATCH) 0.05-0.14 MG/DAY Transdermal Patch Biweekly Place 1 patch onto the skin once a week. 12 patch 1    BUPROPION  MG Oral Tablet 12 Hr TAKE 1 TABLET DAILY 90 tablet 0    Wheat Dextrin (BENEFIBER ON THE GO) Oral Powder Take 1 packet by mouth daily.      nystatin 815006 units Oral Tab Take 1 tablet (500,000 Units total) by mouth daily. 90 tablet 3    Azelastine-Fluticasone 137-50 MCG/ACT Nasal Suspension 1 spray by Nasal route 2 (two) times daily.      aspirin 81 MG Oral Tab EC Take 1 tablet (81 mg total) by mouth in the morning and 1 tablet (81 mg total) before bedtime. (Patient not taking: Reported on 12/2/2024) 28 tablet 0    traMADol 50 MG Oral Tab Take 1 tablet (50 mg total) by mouth every 6 (six) hours as needed. (Patient not taking: Reported on 12/2/2024) 50 tablet 0    oxyCODONE 5 MG Oral Tab Take 1 tablet (5 mg total) by mouth every 4 (four) hours as needed. (Patient not taking: Reported on 12/2/2024) 60 tablet 0    Multiple Vitamins-Minerals (MULTI-VITAMIN/MINERALS) Oral Tab Take 1 tablet by mouth daily. (Patient not taking: Reported on 12/2/2024)      ferrous sulfate 325 (65 FE) MG Oral Tab EC Take 1 tablet (325 mg total) by mouth daily as needed. Before donating blood (Patient not taking: Reported on 12/2/2024)      albuterol (PROAIR HFA) 108 (90 Base)  Is This A New Presentation, Or A Follow-Up?: Skin Lesions How Severe Is Your Skin Lesion?: moderate MCG/ACT Inhalation Aero Soln Inhale 2 puffs into the lungs every 6 (six) hours as needed for Wheezing. 1 each 5    albuterol (2.5 MG/3ML) 0.083% Inhalation Nebu Soln Take 3 mL (2.5 mg total) by nebulization 4 (four) times daily. (Patient not taking: Reported on 12/2/2024) 75 mL 2    budesonide 0.5 MG/2ML Inhalation Suspension Take 2 mL (0.5 mg total) by nebulization 2 (two) times daily. (Patient not taking: Reported on 12/2/2024) 30 ampule 0      Past Medical History:    Asthma (HCC)    COVID    No hospitalization. Lost taste /smell, HA    Osteoarthritis    Visual impairment    glasses      Past Surgical History:   Procedure Laterality Date    Adenoidectomy      Colonoscopy      Other surgical history Left     knee sx for torn cartilage    Other surgical history Right     Hammer toe sx    Other surgical history      Nasal sx    Other surgical history      dental implants    Other surgical history      bladder sling      Family History   Problem Relation Age of Onset    Diabetes Father     Heart Disorder Father     Colon Cancer Mother     Cancer Sister       Social History     Socioeconomic History    Marital status:    Occupational History    Occupation:    Tobacco Use    Smoking status: Never    Smokeless tobacco: Never   Vaping Use    Vaping status: Never Used   Substance and Sexual Activity    Alcohol use: Never    Drug use: Never     Social Drivers of Health      [facility]    Social Connections    Received from [facility], [facility]    Housing Stability         REVIEW OF SYSTEMS:   Review of Systems   Constitutional:  Positive for chills and fatigue.   HENT:  Positive for congestion, postnasal drip and sore throat. Negative for ear discharge, ear pain, sinus pressure, sinus pain, trouble swallowing and voice change.    Eyes: Negative.    Respiratory:  Positive for cough. Negative for  chest tightness and shortness of breath.    Gastrointestinal:  Negative for abdominal pain, diarrhea, nausea and vomiting.   Neurological:  Negative for headaches.       EXAM:   /68 (BP Location: Left arm, Patient Position: Sitting, Cuff Size: adult)   Pulse 72   Temp 99.1 °F (37.3 °C) (Temporal)   Resp 19   Ht 5' 2.5\" (1.588 m)   Wt 116 lb 3.2 oz (52.7 kg)   LMP 05/11/2014 (Exact Date)   SpO2 97%   BMI 20.91 kg/m²   Physical Exam  Vitals and nursing note reviewed.   Constitutional:       General: She is not in acute distress.  HENT:      Head: Atraumatic.      Right Ear: Tympanic membrane, ear canal and external ear normal.      Left Ear: Tympanic membrane, ear canal and external ear normal.      Nose: Nose normal.      Mouth/Throat:      Pharynx: Posterior oropharyngeal erythema present.   Eyes:      General:         Right eye: No discharge.         Left eye: No discharge.      Conjunctiva/sclera: Conjunctivae normal.   Cardiovascular:      Rate and Rhythm: Normal rate and regular rhythm.      Pulses: Normal pulses.      Heart sounds: Normal heart sounds.   Pulmonary:      Effort: Pulmonary effort is normal.      Breath sounds: Normal breath sounds.   Musculoskeletal:      Cervical back: Neck supple.   Skin:     General: Skin is warm and dry.   Neurological:      Mental Status: She is alert and oriented to person, place, and time.         ASSESSMENT AND PLAN:   Diagnoses and all orders for this visit:    Sore throat  -     Rapid Strep    Strep throat  -     azithromycin 250 MG Oral Tab; Take 2 tablets (500 mg total) by mouth daily for 1 day, THEN 1 tablet (250 mg total) daily for 4 days.     -Positive rapid strep discussed, antibiotic sent to pharmacy, over the counter treatment of symptoms and infection control discussed.  -Return for persistent or worsening symptoms, call with questions.  -Patient verbalized understanding and in agreement with plan.    Tatum Reid, CHAMP

## 2024-12-02 NOTE — PROGRESS NOTES
Diagnosis:   L TKR      Referring Provider: Kiet Greene  Date of Evaluation:    11/11/2024    Precautions:  None Next MD visit:   none scheduled  Date of Surgery: 10/16/2024   Insurance Primary/Secondary: MEDICARE / [employer]     # Auth Visits: Med necessity            Subjective: Getting over an illness, a little more winded than usual.     Pain: 2/10      Objective:   AROM: (* denotes performed with pain)   Knee    Flexion: R 120; L 115   Extension: R 0; L 0      PROM: (* denotes performed with pain)   Knee    Flexion: R 126; L 120   Extension: R 0; L 0          Assessment: Continued ROM and strength gains. Good improvement with ADL activity.      Goals:   (To be met in 20 visits)   Pt will improve knee extension ROM to 0 deg to allow proper heel strike during gait and terminal knee extension in stance  Pt will improve knee AROM flexion to >120 degrees to improve ability to perform squats to retrieve objects off the floor.  Pt will improve quad strength to 5/5 to ascend 1 flight of stairs reciprocally without UE assist  Pt will increase hip and knee strength to grossly 4+/5 to be able to get up and down from the floor safely  Pt will demonstrate increased hip ER/ABD strength to 5/5 to perform stepping and squatting activities without excessive femoral IR/ADD  Pt will improve SLS to >20s to improve safety and independence with gait on uneven surfaces such as grass  Pt will be independent and compliant with comprehensive HEP to maintain progress achieved in PT    Plan: Progress strength and proprioception as tolerated  Date: 11/14/2024  TX#: 2/10 Date:  11/18/2024               TX#: 3/10 Date: 11/21/2024                TX#: 4/10 Date: 11/25/2024                TX#: 5/10 Date: 12/2/2024  Tx#: 6/10   THERAPEUTIC EX 38'  Nu-step Lv5 10'  Slant board L1 3x30\"  Gastroc stretch 3x30\"  Hamstring stretch 3x30\"  Ext stretch 3'  Knee PROM 6'  SAQ 3x10  SLR 3x10  SL hip flexor stretch 3x30\"  Clamshells 3x10  Sit to stand on  table 22\" 3x10  Step ups 6\" 3x10  Step downs 4\" 3x10 THERAPEUTIC EX 38'  Nu-step Lv5 10'  Slant board L1 3x30\"  Gastroc stretch 3x30\"  Hamstring stretch 3x30\"  Ext stretch 3'  Knee PROM 6'  SAQ 3x10  SLR 3x10  SL hip flexor stretch 3x30\"  Clamshells 3x10  Sit to stand on table 22\" 3x10  Step ups 6\" 3x10  Step downs 4\" 3x10  Lateral stepping red tband 15'x6  Shuttle squats 56 3x10 THERAPEUTIC EX 38'  Bike 10'  Slant board L1 3x30\"  Gastroc stretch 3x30\"  Hamstring stretch 3x30\"  Ext stretch 3'  Knee PROM 6'  SAQ 3x10  SLR 3x10  SL hip flexor stretch 3x30\"  Clamshells 3x10  Sit to stand on table 22\" 3x10  Step ups 8\" 3x10  Step downs 6\" 3x10  Lateral stepping red tband 15'x6  Shuttle squats 56 3x10 THERAPEUTIC EX 38'  Bike 10'  Slant board L1 3x30\"  Gastroc stretch 3x30\"  Hamstring stretch 3x30\"  Ext stretch 3'  Knee PROM 6'  SAQ 3x10  SLR 3x10  SL hip flexor stretch 3x30\"  Clamshells 3x10  Sit to stand on table 22\" 3x10  Step ups 8\" 3x10  Step downs 8\" 3x10  Lateral stepping grn tband 15'x6  Shuttle squats 75 3x10 THERAPEUTIC EX 38'  Bike 10'  Slant board L1 3x30\"  Gastroc stretch 3x30\"  Hamstring stretch 3x30\"  Ext stretch 3'  Knee PROM 6'  SAQ 3x10  SLR 3x10  SL hip flexor stretch 3x30\"  Clamshells 3x10  Sit to stand on table 20\" 3x10  Step ups 8\" 3x10  Step downs 8\" 3x10  Lateral stepping grn tband 15'x6  Monster walks grn 15'x6  Shuttle squats 75 3x10   MANUAL THERAPY 6'  Patella mobs 2' ea MANUAL THERAPY 6'  Patella mobs 2' ea MANUAL THERAPY 6'  Patella mobs 2' ea MANUAL THERAPY 4'  Patella mobs 4' MANUAL THERAPY 4'  Patella mobs 4'      NEURO RE-ED 8'  Lunge to step 360 3x10  Step 360 step ups 3x10  Balance board balance with lateral challenge 3' NEURO RE-ED 8'  Lunge to step 360 3x10  Step 360 step ups 3x10  Step 360 squats 3x10  Balance board balance with lateral challenge 3'          HEP: QS,SAQ,SLR,heel slides,side stepping, ext stretch     Charges: therapeutic ex 2, neuro re-ed       Total Timed Treatment:  44 min  Total Treatment Time: 50 min

## 2024-12-04 ENCOUNTER — APPOINTMENT (OUTPATIENT)
Dept: PHYSICAL THERAPY | Age: 64
End: 2024-12-04
Attending: ORTHOPAEDIC SURGERY
Payer: COMMERCIAL

## 2024-12-06 ENCOUNTER — PATIENT MESSAGE (OUTPATIENT)
Dept: FAMILY MEDICINE CLINIC | Facility: CLINIC | Age: 64
End: 2024-12-06

## 2024-12-06 NOTE — TELEPHONE ENCOUNTER
Please inform patient the z pack should treat the strep infection and sore throat should continue to improve as last dose of antibiotic today, patient should continue over the counter treatment of symptoms and follow up for persistent or worsening symptoms.

## 2024-12-09 ENCOUNTER — OFFICE VISIT (OUTPATIENT)
Dept: FAMILY MEDICINE CLINIC | Facility: CLINIC | Age: 64
End: 2024-12-09
Payer: MEDICARE

## 2024-12-09 ENCOUNTER — TELEPHONE (OUTPATIENT)
Dept: FAMILY MEDICINE CLINIC | Facility: CLINIC | Age: 64
End: 2024-12-09

## 2024-12-09 ENCOUNTER — OFFICE VISIT (OUTPATIENT)
Dept: PHYSICAL THERAPY | Age: 64
End: 2024-12-09
Attending: ORTHOPAEDIC SURGERY
Payer: COMMERCIAL

## 2024-12-09 VITALS
DIASTOLIC BLOOD PRESSURE: 70 MMHG | TEMPERATURE: 99 F | HEIGHT: 62.5 IN | HEART RATE: 77 BPM | WEIGHT: 113 LBS | BODY MASS INDEX: 20.28 KG/M2 | SYSTOLIC BLOOD PRESSURE: 128 MMHG | RESPIRATION RATE: 12 BRPM | OXYGEN SATURATION: 95 %

## 2024-12-09 DIAGNOSIS — J40 SINOBRONCHITIS: ICD-10-CM

## 2024-12-09 DIAGNOSIS — J32.9 SINOBRONCHITIS: ICD-10-CM

## 2024-12-09 DIAGNOSIS — J02.0 STREP THROAT: Primary | ICD-10-CM

## 2024-12-09 PROCEDURE — 99214 OFFICE O/P EST MOD 30 MIN: CPT | Performed by: FAMILY MEDICINE

## 2024-12-09 PROCEDURE — G2211 COMPLEX E/M VISIT ADD ON: HCPCS | Performed by: FAMILY MEDICINE

## 2024-12-09 PROCEDURE — 97110 THERAPEUTIC EXERCISES: CPT

## 2024-12-09 PROCEDURE — 97112 NEUROMUSCULAR REEDUCATION: CPT

## 2024-12-09 RX ORDER — CEPHALEXIN 500 MG/1
500 CAPSULE ORAL 2 TIMES DAILY
Qty: 20 CAPSULE | Refills: 0 | Status: SHIPPED | OUTPATIENT
Start: 2024-12-09 | End: 2024-12-19

## 2024-12-09 NOTE — PROGRESS NOTES
Diagnosis:   L TKR      Referring Provider: Kiet Greene  Date of Evaluation:    11/11/2024    Precautions:  None Next MD visit:   none scheduled  Date of Surgery: 10/16/2024   Insurance Primary/Secondary: MEDICARE / [employer]     # Auth Visits: Med necessity            Subjective: MD happy with progress.     Pain: 2/10      Objective:   AROM: (* denotes performed with pain)   Knee    Flexion: R 120; L 115   Extension: R 0; L 0      PROM: (* denotes performed with pain)   Knee    Flexion: R 126; L 120   Extension: R 0; L 0          Assessment: Full ROM, good strength and improving proprioception.       Goals:   (To be met in 20 visits)   Pt will improve knee extension ROM to 0 deg to allow proper heel strike during gait and terminal knee extension in stance  Pt will improve knee AROM flexion to >120 degrees to improve ability to perform squats to retrieve objects off the floor.  Pt will improve quad strength to 5/5 to ascend 1 flight of stairs reciprocally without UE assist  Pt will increase hip and knee strength to grossly 4+/5 to be able to get up and down from the floor safely  Pt will demonstrate increased hip ER/ABD strength to 5/5 to perform stepping and squatting activities without excessive femoral IR/ADD  Pt will improve SLS to >20s to improve safety and independence with gait on uneven surfaces such as grass  Pt will be independent and compliant with comprehensive HEP to maintain progress achieved in PT    Plan: Progress strength and proprioception as tolerated  Date:  11/18/2024               TX#: 3/10 Date: 11/21/2024                TX#: 4/10 Date: 11/25/2024                TX#: 5/10 Date: 12/2/2024  Tx#: 6/10 Date: 12/9/2024  Tx#: 7/10   THERAPEUTIC EX 38'  Nu-step Lv5 10'  Slant board L1 3x30\"  Gastroc stretch 3x30\"  Hamstring stretch 3x30\"  Ext stretch 3'  Knee PROM 6'  SAQ 3x10  SLR 3x10  SL hip flexor stretch 3x30\"  Clamshells 3x10  Sit to stand on table 22\" 3x10  Step ups 6\" 3x10  Step downs 4\"  3x10  Lateral stepping red tband 15'x6  Shuttle squats 56 3x10 THERAPEUTIC EX 38'  Bike 10'  Slant board L1 3x30\"  Gastroc stretch 3x30\"  Hamstring stretch 3x30\"  Ext stretch 3'  Knee PROM 6'  SAQ 3x10  SLR 3x10  SL hip flexor stretch 3x30\"  Clamshells 3x10  Sit to stand on table 22\" 3x10  Step ups 8\" 3x10  Step downs 6\" 3x10  Lateral stepping red tband 15'x6  Shuttle squats 56 3x10 THERAPEUTIC EX 38'  Bike 10'  Slant board L1 3x30\"  Gastroc stretch 3x30\"  Hamstring stretch 3x30\"  Ext stretch 3'  Knee PROM 6'  SAQ 3x10  SLR 3x10  SL hip flexor stretch 3x30\"  Clamshells 3x10  Sit to stand on table 22\" 3x10  Step ups 8\" 3x10  Step downs 8\" 3x10  Lateral stepping grn tband 15'x6  Shuttle squats 75 3x10 THERAPEUTIC EX 38'  Bike 10'  Slant board L1 3x30\"  Gastroc stretch 3x30\"  Hamstring stretch 3x30\"  Ext stretch 3'  Knee PROM 6'  SAQ 3x10  SLR 3x10  SL hip flexor stretch 3x30\"  Clamshells 3x10  Sit to stand on table 20\" 3x10  Step ups 8\" 3x10  Step downs 8\" 3x10  Lateral stepping grn tband 15'x6  Monster walks grn 15'x6  Shuttle squats 75 3x10 THERAPEUTIC EX 38'  Bike 10'  Slant board L1 3x30\"  Gastroc stretch 3x30\"  Hamstring stretch 3x30\"  Ext stretch 3'  Knee PROM 6'  SAQ 3x10  SL hip flexor stretch 3x30\"  Clamshells 3x10  Sit to stand on table 20\" 3x10  Step ups 8\" 3x10  Step downs 8\" 3x10  Lateral stepping grn tband 15'x6  Monster walks grn 15'x6  Shuttle squats 75 3x10   MANUAL THERAPY 6'  Patella mobs 2' ea MANUAL THERAPY 6'  Patella mobs 2' ea MANUAL THERAPY 4'  Patella mobs 4' MANUAL THERAPY 4'  Patella mobs 4' MANUAL THERAPY 4'  Patella mobs 4'     NEURO RE-ED 8'  Lunge to step 360 3x10  Step 360 step ups 3x10  Balance board balance with lateral challenge 3' NEURO RE-ED 8'  Lunge to step 360 3x10  Step 360 step ups 3x10  Step 360 squats 3x10  Balance board balance with lateral challenge 3' NEURO RE-ED 8'  Lunge to step 360 3x10  Step 360 step ups 3x10  Step 360 squats 3x10  Balance board balance with lateral  challenge 3'          HEP: QS,SAQ,SLR,heel slides,side stepping, ext stretch     Charges: therapeutic ex 2, neuro re-ed       Total Timed Treatment: 44 min  Total Treatment Time: 50 min

## 2024-12-09 NOTE — TELEPHONE ENCOUNTER
PATIENT WAS IN LAST WEEK FOR A SORE THROAT AND WANTS TO STOP IN AFTER PATIENT TO GET ANOTHER SWAB BECAUSE SHE IS STILL NOT FEELING 100%. SHE IS GOING TO STOP BACK IN AFTER PHYSICAL THERAPY.

## 2024-12-09 NOTE — PROGRESS NOTES
Subjective:   Criss Murphy is a 64 year old female who presents for Sore Throat     See chart  Positive strep  On zpack and finished  Still dealing with severe sore throat      Minimal cough\         Positive strep and does not seem to be better  No distinct dyspne on exertion or short of breath   Recovering from knee surgery       Tolerates cephalosporins well    History/Other:   has a current medication list which includes the following prescription(s): cephalexin, epinephrine, acetaminophen, ondansetron, combipatch, bupropion sr, multi-vitamin/minerals, benefiber on the go, nystatin, albuterol, azelastine-fluticasone, budesonide, and ferrous sulfate.     is allergic to peanuts, tree nuts, cat hair extract, dander, pcn [penicillins], and seasonal.     Review of Systems:  GENERAL HEALTH: feels well no complaints  SKIN: denies any unusual skin lesions or rashes  ENT see HPI   RESPIRATORY: denies shortness of breath with exertion  CARDIOVASCULAR: denies chest pain on exertion  NEURO: denies headaches    Objective:   /70 (BP Location: Left arm, Patient Position: Sitting, Cuff Size: adult)   Pulse 77   Temp 98.7 °F (37.1 °C) (Temporal)   Resp 12   Ht 5' 2.5\" (1.588 m)   Wt 113 lb (51.3 kg)   LMP 05/11/2014 (Exact Date)   SpO2 95%   BMI 20.34 kg/m²  Estimated body mass index is 20.34 kg/m² as calculated from the following:    Height as of this encounter: 5' 2.5\" (1.588 m).    Weight as of this encounter: 113 lb (51.3 kg).  GENERAL: well developed, well nourished,in no apparent distress  SKIN: no rashes,no suspicious lesions  HEENT: atraumatic, normocephalic,ears  are clear  and throat with mild post nasal drainage    NECK: supple,no adenopathy,no bruits  LUNGS: clear to auscultation  CARDIO: RRR without murmur    Assessment & Plan:   1. Strep throat (Primary)  -     Cephalexin; Take 1 capsule (500 mg total) by mouth 2 (two) times daily for 10 days.  Dispense: 20 capsule; Refill: 0      Results  for orders placed or performed in visit on 12/02/24   Rapid Strep    Collection Time: 12/02/24  9:20 AM   Result Value Ref Range    Strep Grp A Screen Positive Negative    Control Line Present with a clear background (yes/no) yes Yes/No    Kit Lot # [id number] Numeric    Kit Expiration Date 11/30/2025 Date         Neftaly Rangel MD, 12/9/2024, 9:14 AM

## 2024-12-09 NOTE — TELEPHONE ENCOUNTER
Patient presents to office stating that her sore throat continues as at office visit on 12-2-24 with APRN. Reports that she finished azithromycin on 12-6-24 and has tried over the counter treatments as advised, see my chart message.    Patient would like to be seen while she is here since she lives a distance away.    Please advise.

## 2024-12-11 ENCOUNTER — APPOINTMENT (OUTPATIENT)
Dept: PHYSICAL THERAPY | Age: 64
End: 2024-12-11
Attending: ORTHOPAEDIC SURGERY
Payer: COMMERCIAL

## 2024-12-13 ENCOUNTER — TELEPHONE (OUTPATIENT)
Dept: FAMILY MEDICINE CLINIC | Facility: CLINIC | Age: 64
End: 2024-12-13

## 2024-12-13 ENCOUNTER — MED REC SCAN ONLY (OUTPATIENT)
Dept: FAMILY MEDICINE CLINIC | Facility: CLINIC | Age: 64
End: 2024-12-13

## 2024-12-13 NOTE — TELEPHONE ENCOUNTER
Fax received from [facility] with Ultrasound Breast Diagnostic Right results with exam date of 12-12-24    Dr Rangel carefully reviewed and documented:  Benign check 6 months    Reminder set up in Epic  Copy to scan    Patient notified and verbalized understanding.

## 2024-12-16 ENCOUNTER — OFFICE VISIT (OUTPATIENT)
Dept: PHYSICAL THERAPY | Age: 64
End: 2024-12-16
Attending: ORTHOPAEDIC SURGERY
Payer: COMMERCIAL

## 2024-12-16 PROCEDURE — 97110 THERAPEUTIC EXERCISES: CPT

## 2024-12-16 PROCEDURE — 97112 NEUROMUSCULAR REEDUCATION: CPT

## 2024-12-16 NOTE — PROGRESS NOTES
Diagnosis:   L TKR      Referring Provider: Kiet Greene  Date of Evaluation:    11/11/2024    Precautions:  None Next MD visit:   none scheduled  Date of Surgery: 10/16/2024   Insurance Primary/Secondary: MEDICARE / [employer]     # Auth Visits: Med necessity            Subjective: Knee continues to do well overall, some increase in swelling with weather changes.     Pain: 2/10      Objective:   AROM: (* denotes performed with pain)   Knee    Flexion: R 120; L 115   Extension: R 0; L 0      PROM: (* denotes performed with pain)   Knee    Flexion: R 126; L 120   Extension: R 0; L 0          Assessment: Added 4 way walkouts with FM to increase multidirectional stability.       Goals:   (To be met in 20 visits)   Pt will improve knee extension ROM to 0 deg to allow proper heel strike during gait and terminal knee extension in stance  Pt will improve knee AROM flexion to >120 degrees to improve ability to perform squats to retrieve objects off the floor.  Pt will improve quad strength to 5/5 to ascend 1 flight of stairs reciprocally without UE assist  Pt will increase hip and knee strength to grossly 4+/5 to be able to get up and down from the floor safely  Pt will demonstrate increased hip ER/ABD strength to 5/5 to perform stepping and squatting activities without excessive femoral IR/ADD  Pt will improve SLS to >20s to improve safety and independence with gait on uneven surfaces such as grass  Pt will be independent and compliant with comprehensive HEP to maintain progress achieved in PT    Plan: Reassess  Date: 11/21/2024                TX#: 4/10 Date: 11/25/2024                TX#: 5/10 Date: 12/2/2024  Tx#: 6/10 Date: 12/9/2024  Tx#: 7/10 Date: 12/16/2024  Tx#: 8/10   THERAPEUTIC EX 38'  Bike 10'  Slant board L1 3x30\"  Gastroc stretch 3x30\"  Hamstring stretch 3x30\"  Ext stretch 3'  Knee PROM 6'  SAQ 3x10  SLR 3x10  SL hip flexor stretch 3x30\"  Clamshells 3x10  Sit to stand on table 22\" 3x10  Step ups 8\" 3x10  Step  downs 6\" 3x10  Lateral stepping red tband 15'x6  Shuttle squats 56 3x10 THERAPEUTIC EX 38'  Bike 10'  Slant board L1 3x30\"  Gastroc stretch 3x30\"  Hamstring stretch 3x30\"  Ext stretch 3'  Knee PROM 6'  SAQ 3x10  SLR 3x10  SL hip flexor stretch 3x30\"  Clamshells 3x10  Sit to stand on table 22\" 3x10  Step ups 8\" 3x10  Step downs 8\" 3x10  Lateral stepping grn tband 15'x6  Shuttle squats 75 3x10 THERAPEUTIC EX 38'  Bike 10'  Slant board L1 3x30\"  Gastroc stretch 3x30\"  Hamstring stretch 3x30\"  Ext stretch 3'  Knee PROM 6'  SAQ 3x10  SLR 3x10  SL hip flexor stretch 3x30\"  Clamshells 3x10  Sit to stand on table 20\" 3x10  Step ups 8\" 3x10  Step downs 8\" 3x10  Lateral stepping grn tband 15'x6  Monster walks grn 15'x6  Shuttle squats 75 3x10 THERAPEUTIC EX 38'  Bike 10'  Slant board L1 3x30\"  Gastroc stretch 3x30\"  Hamstring stretch 3x30\"  Ext stretch 3'  Knee PROM 6'  SAQ 3x10  SL hip flexor stretch 3x30\"  Clamshells 3x10  Sit to stand on table 20\" 3x10  Step ups 8\" 3x10  Step downs 8\" 3x10  Lateral stepping grn tband 15'x6  Monster walks grn 15'x6  Shuttle squats 75 3x10 THERAPEUTIC EX 38'  Bike 10'  Slant board L1 3x30\"  Gastroc stretch 3x30\"  Hamstring stretch 3x30\"  Ext stretch 3'  Knee PROM 6'  SAQ 3x10  SL hip flexor stretch 3x30\"  Clamshells 3x10  Sit to stand on table 20\" 3x10  Step ups 8\" 3x10  Step downs 8\" 3x10  4 way walkouts on FM x10 ea 13#  Monster walks grn 15'x6  Shuttle squats 75 3x10   MANUAL THERAPY 6'  Patella mobs 2' ea MANUAL THERAPY 4'  Patella mobs 4' MANUAL THERAPY 4'  Patella mobs 4' MANUAL THERAPY 4'  Patella mobs 4' MANUAL THERAPY 4'  Patella mobs 4'    NEURO RE-ED 8'  Lunge to step 360 3x10  Step 360 step ups 3x10  Balance board balance with lateral challenge 3' NEURO RE-ED 8'  Lunge to step 360 3x10  Step 360 step ups 3x10  Step 360 squats 3x10  Balance board balance with lateral challenge 3' NEURO RE-ED 8'  Lunge to step 360 3x10  Step 360 step ups 3x10  Step 360 squats 3x10  Balance board  balance with lateral challenge 3' NEURO RE-ED 8'  Lunge to step 360 3x10  Step 360 step ups 3x10  Step 360 squats 3x10  Balance board balance with lateral challenge 3'          HEP: QS,SAQ,SLR,heel slides,side stepping, ext stretch     Charges: therapeutic ex 2, neuro re-ed       Total Timed Treatment: 44 min  Total Treatment Time: 50 min

## 2024-12-20 DIAGNOSIS — F41.9 ANXIETY: ICD-10-CM

## 2024-12-20 RX ORDER — BUPROPION HYDROCHLORIDE 150 MG/1
150 TABLET, EXTENDED RELEASE ORAL DAILY
Qty: 90 TABLET | Refills: 1 | Status: SHIPPED | OUTPATIENT
Start: 2024-12-20

## 2024-12-20 NOTE — TELEPHONE ENCOUNTER
No protocol    OV 12/09/24  REFILL 09/21/24 #90    Future Appointments   Date Time Provider Department Center   12/31/2024  8:45 AM Miles Madera, ALONDRA GUNTER [place]

## 2024-12-31 ENCOUNTER — OFFICE VISIT (OUTPATIENT)
Dept: PHYSICAL THERAPY | Age: 64
End: 2024-12-31
Attending: ORTHOPAEDIC SURGERY
Payer: COMMERCIAL

## 2024-12-31 PROCEDURE — 97112 NEUROMUSCULAR REEDUCATION: CPT

## 2024-12-31 PROCEDURE — 97110 THERAPEUTIC EXERCISES: CPT

## 2024-12-31 NOTE — PROGRESS NOTES
Discharge Summary  Pt has attended 9 visits in Physical Therapy.    Diagnosis:   L TKR      Referring Provider: Kiet Greene  Date of Evaluation:    11/11/2024    Precautions:  None Next MD visit:   none scheduled  Date of Surgery: 10/16/2024   Insurance Primary/Secondary: MEDICARE / [employer]     # Auth Visits: Med necessity            Subjective: Knee feels good overall, still get some swelling when on feet for prolonged time periods.    Pain: 1/10      Objective:   AROM: (* denotes performed with pain)   Knee    Flexion: R 120; L 120   Extension: R 0; L 0      PROM: (* denotes performed with pain)   Knee    Flexion: R 126; L 5   Extension: R 0; L 0          Assessment: Full and functional ROM. Functional strength and balance. Patient has resumed all ADL activity.       Goals:   (To be met in 20 visits)   Pt will improve knee extension ROM to 0 deg to allow proper heel strike during gait and terminal knee extension in stance-Met  Pt will improve knee AROM flexion to >120 degrees to improve ability to perform squats to retrieve objects off the floor.-Met  Pt will improve quad strength to 5/5 to ascend 1 flight of stairs reciprocally without UE assist-Met  Pt will increase hip and knee strength to grossly 4+/5 to be able to get up and down from the floor safely-Met  Pt will demonstrate increased hip ER/ABD strength to 5/5 to perform stepping and squatting activities without excessive femoral IR/ADD-Met  Pt will improve SLS to >20s to improve safety and independence with gait on uneven surfaces such as grass-Met  Pt will be independent and compliant with comprehensive HEP to maintain progress achieved in PT-Met    Post LEFS Score  Post LEFS Score: 82.5 % (12/31/2024  9:08 AM)    38.75 % improvement    Plan: DC to HEP       Patient/Family/Caregiver was advised of these findings, precautions, and treatment options and has agreed to actively participate in planning and for this course of care.    Thank you for your  referral. If you have any questions, please contact me at Dept: [phone].    Sincerely,  Electronically signed by therapist: Miles Madera PT     Physician's certification required:  Yes  Please co-sign or sign and return this letter via fax as soon as possible to [phone].   I certify the need for these services furnished under this plan of treatment and while under my care.    X___________________________________________________ Date____________________    Certification From: 12/31/2024  To:3/31/2025    Date: 11/25/2024                TX#: 5/10 Date: 12/2/2024  Tx#: 6/10 Date: 12/9/2024  Tx#: 7/10 Date: 12/16/2024  Tx#: 8/10 Date: 12/31/2024  Tx#: 9/10   THERAPEUTIC EX 38'  Bike 10'  Slant board L1 3x30\"  Gastroc stretch 3x30\"  Hamstring stretch 3x30\"  Ext stretch 3'  Knee PROM 6'  SAQ 3x10  SLR 3x10  SL hip flexor stretch 3x30\"  Clamshells 3x10  Sit to stand on table 22\" 3x10  Step ups 8\" 3x10  Step downs 8\" 3x10  Lateral stepping grn tband 15'x6  Shuttle squats 75 3x10 THERAPEUTIC EX 38'  Bike 10'  Slant board L1 3x30\"  Gastroc stretch 3x30\"  Hamstring stretch 3x30\"  Ext stretch 3'  Knee PROM 6'  SAQ 3x10  SLR 3x10  SL hip flexor stretch 3x30\"  Clamshells 3x10  Sit to stand on table 20\" 3x10  Step ups 8\" 3x10  Step downs 8\" 3x10  Lateral stepping grn tband 15'x6  Monster walks grn 15'x6  Shuttle squats 75 3x10 THERAPEUTIC EX 38'  Bike 10'  Slant board L1 3x30\"  Gastroc stretch 3x30\"  Hamstring stretch 3x30\"  Ext stretch 3'  Knee PROM 6'  SAQ 3x10  SL hip flexor stretch 3x30\"  Clamshells 3x10  Sit to stand on table 20\" 3x10  Step ups 8\" 3x10  Step downs 8\" 3x10  Lateral stepping grn tband 15'x6  Monster walks grn 15'x6  Shuttle squats 75 3x10 THERAPEUTIC EX 38'  Bike 10'  Slant board L1 3x30\"  Gastroc stretch 3x30\"  Hamstring stretch 3x30\"  Ext stretch 3'  Knee PROM 6'  SAQ 3x10  SL hip flexor stretch 3x30\"  Crystalhells 3x10  Sit to stand on table 20\" 3x10  Step ups 8\" 3x10  Step downs 8\" 3x10  4 way  walkouts on FM x10 ea 13#  Paresh walks grn 15'x6  Shuttle squats 75 3x10 THERAPEUTIC EX 38'  Bike 10'  Slant board L1 3x30\"  Gastroc stretch 3x30\"  Hamstring stretch 3x30\"  Ext stretch 3'  Knee PROM 6'  SAQ 3x10  SL hip flexor stretch 3x30\"  Clamshells 3x10  Sit to stand on table 20\" 3x10  Step ups 8\" 3x10  Step downs 8\" 3x10  4 way walkouts on FM x10 ea 13#  Paresh walks grn 15'x6  Shuttle squats 75 3x10   MANUAL THERAPY 4'  Patella mobs 4' MANUAL THERAPY 4'  Patella mobs 4' MANUAL THERAPY 4'  Patella mobs 4' MANUAL THERAPY 4'  Patella mobs 4' MANUAL THERAPY 4'  Patella mobs 4'   NEURO RE-ED 8'  Lunge to step 360 3x10  Step 360 step ups 3x10  Balance board balance with lateral challenge 3' NEURO RE-ED 8'  Lunge to step 360 3x10  Step 360 step ups 3x10  Step 360 squats 3x10  Balance board balance with lateral challenge 3' NEURO RE-ED 8'  Lunge to step 360 3x10  Step 360 step ups 3x10  Step 360 squats 3x10  Balance board balance with lateral challenge 3' NEURO RE-ED 8'  Lunge to step 360 3x10  Step 360 step ups 3x10  Step 360 squats 3x10  Balance board balance with lateral challenge 3' NEURO RE-ED 8'  Lunge to step 360 3x10  Step 360 step ups 3x10  Step 360 squats 3x10  Balance board balance with lateral challenge 3'          HEP: QS,SAQ,SLR,heel slides,side stepping, ext stretch     Charges: therapeutic ex 2, neuro re-ed       Total Timed Treatment: 44 min  Total Treatment Time: 50 min

## 2025-02-05 ENCOUNTER — OFFICE VISIT (OUTPATIENT)
Dept: FAMILY MEDICINE CLINIC | Facility: CLINIC | Age: 65
End: 2025-02-05
Payer: MEDICARE

## 2025-02-05 VITALS
DIASTOLIC BLOOD PRESSURE: 80 MMHG | OXYGEN SATURATION: 99 % | TEMPERATURE: 99 F | HEIGHT: 62.5 IN | WEIGHT: 118 LBS | BODY MASS INDEX: 21.17 KG/M2 | SYSTOLIC BLOOD PRESSURE: 120 MMHG | HEART RATE: 64 BPM

## 2025-02-05 DIAGNOSIS — J02.9 SORE THROAT: Primary | ICD-10-CM

## 2025-02-05 DIAGNOSIS — R06.2 WHEEZING: ICD-10-CM

## 2025-02-05 DIAGNOSIS — J30.1 SEASONAL ALLERGIC RHINITIS DUE TO POLLEN: ICD-10-CM

## 2025-02-05 DIAGNOSIS — J45.21 MILD INTERMITTENT REACTIVE AIRWAY DISEASE WITH ACUTE EXACERBATION (HCC): ICD-10-CM

## 2025-02-05 LAB
CONTROL LINE PRESENT WITH A CLEAR BACKGROUND (YES/NO): YES YES/NO
KIT LOT #: NORMAL NUMERIC
STREP GRP A CUL-SCR: NEGATIVE

## 2025-02-05 PROCEDURE — 87081 CULTURE SCREEN ONLY: CPT

## 2025-02-05 PROCEDURE — 87637 SARSCOV2&INF A&B&RSV AMP PRB: CPT

## 2025-02-05 PROCEDURE — 99213 OFFICE O/P EST LOW 20 MIN: CPT

## 2025-02-05 PROCEDURE — 87880 STREP A ASSAY W/OPTIC: CPT

## 2025-02-05 RX ORDER — PREDNISONE 20 MG/1
40 TABLET ORAL DAILY
Qty: 10 TABLET | Refills: 0 | Status: SHIPPED | OUTPATIENT
Start: 2025-02-05 | End: 2025-02-10

## 2025-02-05 RX ORDER — PREDNISONE 20 MG/1
40 TABLET ORAL DAILY
Qty: 10 TABLET | Refills: 0 | Status: SHIPPED | OUTPATIENT
Start: 2025-02-05 | End: 2025-02-05

## 2025-02-05 RX ORDER — CYCLOBENZAPRINE HCL 10 MG
10 TABLET ORAL NIGHTLY
COMMUNITY
Start: 2025-01-06

## 2025-02-05 RX ORDER — DICLOFENAC SODIUM 75 MG/1
75 TABLET, DELAYED RELEASE ORAL 2 TIMES DAILY
COMMUNITY
Start: 2024-12-06

## 2025-02-05 NOTE — PROGRESS NOTES
HPI:   Criss is a 65 yr. Old female here today for a sore throat.  Patient reports symptoms started on Monday. Throat felt like this when she had strep.  Going out of town in around 10 days.     Allergy symptoms have been flaring as she has not been receiving allergy shots for the past couple of years.  Does have an upcoming appointment with new allergist. Changed from azelastine-flonase to azelastine because she has heard some things about intranasal steroid and eyes.          Current Outpatient Medications   Medication Sig Dispense Refill    cyclobenzaprine 10 MG Oral Tab Take 1 tablet (10 mg total) by mouth nightly.      diclofenac 75 MG Oral Tab EC Take 1 tablet (75 mg total) by mouth 2 (two) times daily.      predniSONE 20 MG Oral Tab Take 2 tablets (40 mg total) by mouth daily for 5 days. 10 tablet 0    BUPROPION  MG Oral Tablet 12 Hr TAKE 1 TABLET DAILY 90 tablet 1    EPINEPHrine 0.3 MG/0.3ML Injection Solution Auto-injector       acetaminophen 500 MG Oral Tab Take 2 tablets (1,000 mg total) by mouth 3 (three) times daily. 90 tablet 0    ondansetron (ZOFRAN) 4 mg tablet Take 1 tablet (4 mg total) by mouth every 8 (eight) hours as needed for Nausea. 30 tablet 1    Estradiol-Norethindrone Acet (COMBIPATCH) 0.05-0.14 MG/DAY Transdermal Patch Biweekly Place 1 patch onto the skin once a week. 12 patch 1    Multiple Vitamins-Minerals (MULTI-VITAMIN/MINERALS) Oral Tab Take 1 tablet by mouth daily.      ferrous sulfate 325 (65 FE) MG Oral Tab EC Take 1 tablet (325 mg total) by mouth daily as needed. Before donating blood      Wheat Dextrin (BENEFIBER ON THE GO) Oral Powder Take 1 packet by mouth daily.      nystatin 172682 units Oral Tab Take 1 tablet (500,000 Units total) by mouth daily. 90 tablet 3    albuterol (2.5 MG/3ML) 0.083% Inhalation Nebu Soln Take 3 mL (2.5 mg total) by nebulization 4 (four) times daily. 75 mL 2    Azelastine-Fluticasone 137-50 MCG/ACT Nasal Suspension 1 spray by Nasal route 2  (two) times daily.      budesonide 0.5 MG/2ML Inhalation Suspension Take 2 mL (0.5 mg total) by nebulization 2 (two) times daily. 30 ampule 0      Past Medical History:    Asthma (HCC)    COVID    No hospitalization. Lost taste /smell, HA    Osteoarthritis    Visual impairment    glasses      Past Surgical History:   Procedure Laterality Date    Adenoidectomy      Colonoscopy      Other surgical history Left     knee sx for torn cartilage    Other surgical history Right     Hammer toe sx    Other surgical history      Nasal sx    Other surgical history      dental implants    Other surgical history      bladder sling      Family History   Problem Relation Age of Onset    Diabetes Father     Heart Disorder Father     Colon Cancer Mother     Cancer Sister       Social History     Socioeconomic History    Marital status:    Occupational History    Occupation:    Tobacco Use    Smoking status: Never    Smokeless tobacco: Never   Vaping Use    Vaping status: Never Used   Substance and Sexual Activity    Alcohol use: Never    Drug use: Never     Social Drivers of Health      [facility]    Housing Stability         REVIEW OF SYSTEMS:   Review of Systems   Constitutional:  Positive for chills and fatigue. Negative for fever.   HENT:  Positive for postnasal drip and sore throat. Negative for congestion, ear discharge, ear pain, sinus pressure and trouble swallowing.    Eyes: Negative.    Respiratory:  Positive for cough. Negative for chest tightness and shortness of breath.    Cardiovascular: Negative.    Gastrointestinal: Negative.    Neurological:  Negative for dizziness, light-headedness and headaches.       EXAM:   /80   Pulse 64   Temp 99 °F (37.2 °C) (Temporal)   Wt 118 lb (53.5 kg)   LMP 05/11/2014 (Exact Date)   SpO2 99%   BMI 21.24 kg/m²   Physical Exam  Vitals and nursing note reviewed.   Constitutional:        General: She is not in acute distress.     Appearance: Normal appearance. She is not toxic-appearing.   HENT:      Head: Normocephalic.      Right Ear: Tympanic membrane, ear canal and external ear normal.      Left Ear: Tympanic membrane, ear canal and external ear normal.      Nose: Rhinorrhea present.      Right Turbinates: Swollen.      Left Turbinates: Swollen.      Mouth/Throat:      Mouth: Mucous membranes are moist.      Pharynx: Posterior oropharyngeal erythema present. No oropharyngeal exudate.   Eyes:      General:         Right eye: No discharge.         Left eye: No discharge.      Conjunctiva/sclera: Conjunctivae normal.   Cardiovascular:      Rate and Rhythm: Normal rate and regular rhythm.      Pulses: Normal pulses.      Heart sounds: Normal heart sounds.   Pulmonary:      Effort: Pulmonary effort is normal.      Breath sounds: Wheezing (expiratory) present.   Musculoskeletal:      Cervical back: Neck supple.   Skin:     General: Skin is warm and dry.   Neurological:      Mental Status: She is alert and oriented to person, place, and time.   Psychiatric:         Behavior: Behavior normal.         ASSESSMENT AND PLAN:   Diagnoses and all orders for this visit:    Sore throat  -     Rapid Strep  -     Grp A Strep Cult, Throat  -     SARS-CoV-2/Flu A and B/RSV by PCR ([facility]) [E]; Future    Wheezing  -     Discontinue: predniSONE 20 MG Oral Tab; Take 2 tablets (40 mg total) by mouth daily for 5 days.  -     predniSONE 20 MG Oral Tab; Take 2 tablets (40 mg total) by mouth daily for 5 days.    Mild intermittent reactive airway disease with acute exacerbation (HCC)    Seasonal allergic rhinitis due to pollen     -Patient informed of negative rapid strep, culture and viral respiratory panel sent.  Medication as above for wheezing.  Recommend continue with current treatment of environmental/seasonal allergies, keep follow up with new allergist.   -Return for persistent or worsening symptoms, call with  questions.  -Patient verbalized understanding and in agreement with plan.    Tatum Reid, APRN

## 2025-02-06 LAB
FLUAV + FLUBV RNA SPEC NAA+PROBE: NOT DETECTED
FLUAV + FLUBV RNA SPEC NAA+PROBE: NOT DETECTED
RSV RNA SPEC NAA+PROBE: NOT DETECTED
SARS-COV-2 RNA RESP QL NAA+PROBE: NOT DETECTED

## 2025-02-07 ENCOUNTER — PATIENT MESSAGE (OUTPATIENT)
Dept: FAMILY MEDICINE CLINIC | Facility: CLINIC | Age: 65
End: 2025-02-07

## 2025-02-07 DIAGNOSIS — J32.9 SINOBRONCHITIS: Primary | ICD-10-CM

## 2025-02-07 DIAGNOSIS — J40 SINOBRONCHITIS: Primary | ICD-10-CM

## 2025-02-07 RX ORDER — AZITHROMYCIN 250 MG/1
TABLET, FILM COATED ORAL
Qty: 6 TABLET | Refills: 0 | Status: SHIPPED | OUTPATIENT
Start: 2025-02-07 | End: 2025-02-12

## 2025-02-10 ENCOUNTER — TELEPHONE (OUTPATIENT)
Dept: FAMILY MEDICINE CLINIC | Facility: CLINIC | Age: 65
End: 2025-02-10

## 2025-02-10 ENCOUNTER — MED REC SCAN ONLY (OUTPATIENT)
Dept: FAMILY MEDICINE CLINIC | Facility: CLINIC | Age: 65
End: 2025-02-10

## 2025-02-10 ENCOUNTER — OFFICE VISIT (OUTPATIENT)
Dept: FAMILY MEDICINE CLINIC | Facility: CLINIC | Age: 65
End: 2025-02-10
Payer: MEDICARE

## 2025-02-10 VITALS
TEMPERATURE: 100 F | DIASTOLIC BLOOD PRESSURE: 70 MMHG | SYSTOLIC BLOOD PRESSURE: 122 MMHG | BODY MASS INDEX: 21.42 KG/M2 | HEIGHT: 62.5 IN | OXYGEN SATURATION: 98 % | WEIGHT: 119.38 LBS | RESPIRATION RATE: 20 BRPM | HEART RATE: 74 BPM

## 2025-02-10 DIAGNOSIS — R05.1 ACUTE COUGH: Primary | ICD-10-CM

## 2025-02-10 DIAGNOSIS — J02.9 SORE THROAT: ICD-10-CM

## 2025-02-10 PROCEDURE — 99213 OFFICE O/P EST LOW 20 MIN: CPT

## 2025-02-10 RX ORDER — PREDNISONE 20 MG/1
40 TABLET ORAL DAILY
Qty: 6 TABLET | Refills: 0 | Status: SHIPPED | OUTPATIENT
Start: 2025-02-10 | End: 2025-02-13

## 2025-02-10 RX ORDER — ALBUTEROL SULFATE 0.83 MG/ML
2.5 SOLUTION RESPIRATORY (INHALATION) EVERY 6 HOURS PRN
Qty: 360 EACH | Refills: 0 | Status: SHIPPED | OUTPATIENT
Start: 2025-02-10

## 2025-02-10 NOTE — TELEPHONE ENCOUNTER
Scheduled a Mychart appointment today at 2:00 with Tatum for     Persistence couch, can’t clear throat, still sore, short of breath.    Please triage

## 2025-02-10 NOTE — TELEPHONE ENCOUNTER
Received fax from [employer] stating albuterol sulfate nebulizer solution is covered under Medicare part B so Medicare part D plan cannot cover a drug already covered by medicare part B.    Spoke with Ana at pharmacy who states medication did go through under part B and patient has picked up.    Copy of above document sent to scan.

## 2025-02-10 NOTE — TELEPHONE ENCOUNTER
Spoke with patient who reports that she scheduled appointment today due to persisting symptoms as at office visit on 2-5-25. Main symptom is coughing which at times causes her to be slightly short of breath.  Feels she is unable to bring anything up and it is stuck in her throat. No respiratory distress at time of call. Patient is concerned due to upcoming travel plans.    Future Appointments   Date Time Provider Department Center   2/10/2025  2:00 PM Tatum Reid APRN [id number] [place]

## 2025-02-10 NOTE — PROGRESS NOTES
HPI:   Criss is a 65 yr. Old female here today for a cough and sore throat.  Patient seen in office 5 days ago for sore throat x 2 days.  Prescribed prednisone.  Patient reports she called office on 2/7/25 for symptoms and was prescribed azithromycin by another provider.  Per patient she presents today with a persistent cough that is worse at night, also feels as though her sinuses are draining more.  Patient reports she finished the prednisone this morning and continues to take the azithromycin.  She is leaving for out of town next week an worried that symptoms will not be resolved.           Current Outpatient Medications   Medication Sig Dispense Refill    predniSONE 20 MG Oral Tab Take 2 tablets (40 mg total) by mouth daily for 3 days. 6 tablet 0    albuterol (2.5 MG/3ML) 0.083% Inhalation Nebu Soln Take 3 mL (2.5 mg total) by nebulization every 6 (six) hours as needed for Wheezing or Shortness of Breath (cough). 360 each 0    azithromycin 250 MG Oral Tab Take 2 tablets (500 mg total) by mouth daily for 1 day, THEN 1 tablet (250 mg total) daily for 4 days. 6 tablet 0    cyclobenzaprine 10 MG Oral Tab Take 1 tablet (10 mg total) by mouth nightly.      diclofenac 75 MG Oral Tab EC Take 1 tablet (75 mg total) by mouth 2 (two) times daily.      predniSONE 20 MG Oral Tab Take 2 tablets (40 mg total) by mouth daily for 5 days. 10 tablet 0    BUPROPION  MG Oral Tablet 12 Hr TAKE 1 TABLET DAILY 90 tablet 1    EPINEPHrine 0.3 MG/0.3ML Injection Solution Auto-injector       acetaminophen 500 MG Oral Tab Take 2 tablets (1,000 mg total) by mouth 3 (three) times daily. 90 tablet 0    ondansetron (ZOFRAN) 4 mg tablet Take 1 tablet (4 mg total) by mouth every 8 (eight) hours as needed for Nausea. 30 tablet 1    Estradiol-Norethindrone Acet (COMBIPATCH) 0.05-0.14 MG/DAY Transdermal Patch Biweekly Place 1 patch onto the skin once a week. 12 patch 1    Multiple Vitamins-Minerals (MULTI-VITAMIN/MINERALS) Oral Tab Take 1  tablet by mouth daily.      ferrous sulfate 325 (65 FE) MG Oral Tab EC Take 1 tablet (325 mg total) by mouth daily as needed. Before donating blood      Wheat Dextrin (BENEFIBER ON THE GO) Oral Powder Take 1 packet by mouth daily.      nystatin [id number] units Oral Tab Take 1 tablet (500,000 Units total) by mouth daily. 90 tablet 3    albuterol (2.5 MG/3ML) 0.083% Inhalation Nebu Soln Take 3 mL (2.5 mg total) by nebulization 4 (four) times daily. 75 mL 2    Azelastine-Fluticasone 137-50 MCG/ACT Nasal Suspension 1 spray by Nasal route 2 (two) times daily.      budesonide 0.5 MG/2ML Inhalation Suspension Take 2 mL (0.5 mg total) by nebulization 2 (two) times daily. 30 ampule 0      Past Medical History:    Asthma (HCC)    COVID    No hospitalization. Lost taste /smell, HA    Osteoarthritis    Visual impairment    glasses      Past Surgical History:   Procedure Laterality Date    Adenoidectomy      Colonoscopy      Other surgical history Left     knee sx for torn cartilage    Other surgical history Right     Hammer toe sx    Other surgical history      Nasal sx    Other surgical history      dental implants    Other surgical history      bladder sling      Family History   Problem Relation Age of Onset    Diabetes Father     Heart Disorder Father     Colon Cancer Mother     Cancer Sister       Social History     Socioeconomic History    Marital status:    Occupational History    Occupation:    Tobacco Use    Smoking status: Never    Smokeless tobacco: Never   Vaping Use    Vaping status: Never Used   Substance and Sexual Activity    Alcohol use: Never    Drug use: Never     Social Drivers of Health      [facility]    Housing Stability         REVIEW OF SYSTEMS:   Review of Systems   Constitutional:  Negative for chills and fever.   HENT:  Positive for postnasal drip, rhinorrhea and sore throat. Negative for congestion, ear pain, sinus  pressure and trouble swallowing.    Eyes: Negative.    Respiratory:  Positive for cough. Negative for chest tightness and shortness of breath.    Cardiovascular: Negative.    Gastrointestinal:  Negative for abdominal pain, diarrhea, nausea and vomiting.   Neurological:  Positive for light-headedness (at times). Negative for dizziness and headaches.       EXAM:   /70 (BP Location: Left arm, Patient Position: Sitting, Cuff Size: adult)   Pulse 74   Temp 99.9 °F (37.7 °C) (Temporal)   Resp 20   Ht 5' 2.5\" (1.588 m)   Wt 119 lb 6.4 oz (54.2 kg)   LMP 05/11/2014 (Exact Date)   SpO2 98%   BMI 21.49 kg/m²   Physical Exam  Vitals and nursing note reviewed.   Constitutional:       General: She is not in acute distress.     Appearance: Normal appearance. She is not toxic-appearing.   HENT:      Head: Normocephalic.      Right Ear: Tympanic membrane, ear canal and external ear normal.      Left Ear: Tympanic membrane, ear canal and external ear normal.      Nose: Congestion and rhinorrhea present.      Right Turbinates: Swollen.      Left Turbinates: Swollen.      Mouth/Throat:      Mouth: Mucous membranes are moist.      Pharynx: Posterior oropharyngeal erythema present. No oropharyngeal exudate.   Eyes:      General:         Right eye: No discharge.         Left eye: No discharge.      Conjunctiva/sclera: Conjunctivae normal.   Cardiovascular:      Rate and Rhythm: Normal rate and regular rhythm.      Pulses: Normal pulses.      Heart sounds: Normal heart sounds.   Pulmonary:      Effort: Pulmonary effort is normal.      Breath sounds: Normal breath sounds. No wheezing, rhonchi or rales.   Musculoskeletal:      Cervical back: Neck supple.   Lymphadenopathy:      Cervical: Cervical adenopathy present.   Skin:     General: Skin is warm and dry.   Neurological:      Mental Status: She is alert and oriented to person, place, and time.   Psychiatric:         Behavior: Behavior normal.         ASSESSMENT AND PLAN:    Diagnoses and all orders for this visit:    Acute cough  -     predniSONE 20 MG Oral Tab; Take 2 tablets (40 mg total) by mouth daily for 3 days.  -     albuterol (2.5 MG/3ML) 0.083% Inhalation Nebu Soln; Take 3 mL (2.5 mg total) by nebulization every 6 (six) hours as needed for Wheezing or Shortness of Breath (cough).    Sore throat     -Medications as above sent to pharmacy.  Education provided regarding expected duration and course of a viral illness. Recommend complete antibiotic course and over the counter treatment of symptoms.  Add antihistamine and intranasal flonase.  Also discussed 1 tsp. Honey, cepacol lozenges for symptomatic relief.  -Return in 3-5 days for persistent or worsening symptoms, call with questions.  -Patient verbalized understanding and in agreement with plan.    CHAMP Odonnell

## 2025-02-10 NOTE — TELEPHONE ENCOUNTER
Faxed from pharmacy requesting prior authorization for albuterol sulfate 0.083% nebulizer solution    Prior authorization initiated through Epic awaiting response.

## 2025-02-11 ENCOUNTER — TELEPHONE (OUTPATIENT)
Dept: FAMILY MEDICINE CLINIC | Facility: CLINIC | Age: 65
End: 2025-02-11

## 2025-02-11 DIAGNOSIS — R05.3 CHRONIC COUGH: ICD-10-CM

## 2025-02-11 DIAGNOSIS — J45.21 MILD INTERMITTENT REACTIVE AIRWAY DISEASE WITH ACUTE EXACERBATION (HCC): Primary | ICD-10-CM

## 2025-02-11 RX ORDER — ALBUTEROL SULFATE 0.83 MG/ML
2.5 SOLUTION RESPIRATORY (INHALATION) EVERY 6 HOURS PRN
Qty: 360 EACH | Refills: 0 | Status: SHIPPED | OUTPATIENT
Start: 2025-02-11

## 2025-02-11 NOTE — TELEPHONE ENCOUNTER
Albuterol for Nebs-insurance issues filling it.  Insurance wont pay for it because of DX code of Acute Cough.  Would like a script sent with possibly Chronic cough to try and cover the insurance.  This was Janey at [facility] in [place].

## 2025-02-13 ENCOUNTER — PATIENT MESSAGE (OUTPATIENT)
Dept: FAMILY MEDICINE CLINIC | Facility: CLINIC | Age: 65
End: 2025-02-13

## 2025-02-24 RX ORDER — NYSTATIN 500000 [USP'U]/1
1 TABLET, COATED ORAL DAILY
Qty: 90 TABLET | Refills: 3 | Status: SHIPPED | OUTPATIENT
Start: 2025-02-24

## 2025-02-24 NOTE — TELEPHONE ENCOUNTER
LOV: 2/5/2025  LAST LAB: 9/25/24  LAST RX:  Medication Quantity Refills Start End   nystatin 500901 units Oral Tab 90 tablet 3 1/10/2024 --   Sig:   Take 1 tablet (500,000 Units total) by     Next OV: No future appointments.   PROTOCOL:

## 2025-02-25 ENCOUNTER — PATIENT MESSAGE (OUTPATIENT)
Dept: FAMILY MEDICINE CLINIC | Facility: CLINIC | Age: 65
End: 2025-02-25

## 2025-02-25 NOTE — TELEPHONE ENCOUNTER
Call placed to patient concerned about lingering cough requesting appointment for follow up    Future Appointments   Date Time Provider Department Center   2/26/2025  8:30 AM Tatum Reid APRN [facility] [place]

## 2025-02-26 ENCOUNTER — OFFICE VISIT (OUTPATIENT)
Dept: FAMILY MEDICINE CLINIC | Facility: CLINIC | Age: 65
End: 2025-02-26
Payer: MEDICARE

## 2025-02-26 VITALS
DIASTOLIC BLOOD PRESSURE: 72 MMHG | WEIGHT: 119 LBS | BODY MASS INDEX: 21 KG/M2 | HEART RATE: 73 BPM | OXYGEN SATURATION: 94 % | TEMPERATURE: 98 F | RESPIRATION RATE: 12 BRPM | SYSTOLIC BLOOD PRESSURE: 110 MMHG

## 2025-02-26 DIAGNOSIS — J02.0 STREP PHARYNGITIS: Primary | ICD-10-CM

## 2025-02-26 DIAGNOSIS — J02.9 SORE THROAT: ICD-10-CM

## 2025-02-26 LAB
CONTROL LINE PRESENT WITH A CLEAR BACKGROUND (YES/NO): YES YES/NO
KIT LOT #: NORMAL NUMERIC
STREP GRP A CUL-SCR: POSITIVE

## 2025-02-26 PROCEDURE — 87637 SARSCOV2&INF A&B&RSV AMP PRB: CPT

## 2025-02-26 PROCEDURE — 99214 OFFICE O/P EST MOD 30 MIN: CPT

## 2025-02-26 PROCEDURE — 87880 STREP A ASSAY W/OPTIC: CPT

## 2025-02-26 RX ORDER — BENZONATATE 200 MG/1
200 CAPSULE ORAL 3 TIMES DAILY PRN
Qty: 30 CAPSULE | Refills: 0 | Status: SHIPPED | OUTPATIENT
Start: 2025-02-26

## 2025-02-26 RX ORDER — AZITHROMYCIN 250 MG/1
TABLET, FILM COATED ORAL
Qty: 10 TABLET | Refills: 0 | Status: SHIPPED | OUTPATIENT
Start: 2025-02-26

## 2025-02-26 RX ORDER — AZITHROMYCIN 250 MG/1
TABLET, FILM COATED ORAL
Qty: 6 TABLET | Refills: 0 | Status: SHIPPED | OUTPATIENT
Start: 2025-02-26 | End: 2025-02-26

## 2025-02-26 NOTE — PROGRESS NOTES
HPI:   Criss is a 65 yr. Old female here today for a sore throat and a cough. Patient reports was feeling better after prednisone was prescribed on 2/10/25.  Continuing to use her albuterol nebs, using a nasal spray prescribed by her allergy doctor whom she saw yesterday.  Patient presents today reports sore throat, runny nose, and cough.  Patient reports she and family members awoke on Sunday with sniffles.  Flew home on Sunday.           Current Outpatient Medications   Medication Sig Dispense Refill    benzonatate 200 MG Oral Cap Take 1 capsule (200 mg total) by mouth 3 (three) times daily as needed for cough. 30 capsule 0    azithromycin 250 MG Oral Tab Take 500mg by mouth daily x 5 days. 10 tablet 0    NYSTATIN [id number] units Oral Tab TAKE 1 TABLET DAILY 90 tablet 3    albuterol (2.5 MG/3ML) 0.083% Inhalation Nebu Soln Take 3 mL (2.5 mg total) by nebulization every 6 (six) hours as needed for Wheezing or Shortness of Breath (cough). 360 each 0    cyclobenzaprine 10 MG Oral Tab Take 1 tablet (10 mg total) by mouth nightly.      diclofenac 75 MG Oral Tab EC Take 1 tablet (75 mg total) by mouth 2 (two) times daily.      BUPROPION  MG Oral Tablet 12 Hr TAKE 1 TABLET DAILY 90 tablet 1    EPINEPHrine 0.3 MG/0.3ML Injection Solution Auto-injector       acetaminophen 500 MG Oral Tab Take 2 tablets (1,000 mg total) by mouth 3 (three) times daily. 90 tablet 0    ondansetron (ZOFRAN) 4 mg tablet Take 1 tablet (4 mg total) by mouth every 8 (eight) hours as needed for Nausea. 30 tablet 1    Estradiol-Norethindrone Acet (COMBIPATCH) 0.05-0.14 MG/DAY Transdermal Patch Biweekly Place 1 patch onto the skin once a week. 12 patch 1    Multiple Vitamins-Minerals (MULTI-VITAMIN/MINERALS) Oral Tab Take 1 tablet by mouth daily.      ferrous sulfate 325 (65 FE) MG Oral Tab EC Take 1 tablet (325 mg total) by mouth daily as needed. Before donating blood      Wheat Dextrin (BENEFIBER ON THE GO) Oral Powder Take 1 packet by mouth  daily.      albuterol (2.5 MG/3ML) 0.083% Inhalation Nebu Soln Take 3 mL (2.5 mg total) by nebulization 4 (four) times daily. 75 mL 2    Azelastine-Fluticasone 137-50 MCG/ACT Nasal Suspension 1 spray by Nasal route 2 (two) times daily.      budesonide 0.5 MG/2ML Inhalation Suspension Take 2 mL (0.5 mg total) by nebulization 2 (two) times daily. 30 ampule 0      Past Medical History:    Asthma (HCC)    COVID    No hospitalization. Lost taste /smell, HA    Osteoarthritis    Visual impairment    glasses      Past Surgical History:   Procedure Laterality Date    Adenoidectomy      Colonoscopy      Other surgical history Left     knee sx for torn cartilage    Other surgical history Right     Hammer toe sx    Other surgical history      Nasal sx    Other surgical history      dental implants    Other surgical history      bladder sling      Family History   Problem Relation Age of Onset    Diabetes Father     Heart Disorder Father     Colon Cancer Mother     Cancer Sister       Social History     Socioeconomic History    Marital status:    Occupational History    Occupation:    Tobacco Use    Smoking status: Never    Smokeless tobacco: Never   Vaping Use    Vaping status: Never Used   Substance and Sexual Activity    Alcohol use: Never    Drug use: Never     Social Drivers of Health      [facility]    Housing Stability         REVIEW OF SYSTEMS:   Review of Systems   Constitutional:  Positive for chills and fatigue. Negative for fever.   HENT:  Positive for postnasal drip and rhinorrhea. Negative for congestion, ear discharge and ear pain.    Eyes: Negative.    Respiratory:  Positive for cough. Negative for chest tightness and shortness of breath.    Cardiovascular: Negative.    Gastrointestinal: Negative.    Skin: Negative.    Neurological:  Negative for dizziness and light-headedness.       EXAM:   /72 (BP Location: Right arm,  Patient Position: Sitting, Cuff Size: adult)   Pulse 73   Temp 97.6 °F (36.4 °C) (Temporal)   Resp 12   Wt 119 lb (54 kg)   LMP 05/11/2014 (Exact Date)   SpO2 94%   BMI 21.42 kg/m²   Physical Exam  Vitals reviewed.   Constitutional:       General: She is not in acute distress.     Appearance: She is not ill-appearing.   HENT:      Head: Atraumatic.      Right Ear: Tympanic membrane, ear canal and external ear normal.      Left Ear: Tympanic membrane, ear canal and external ear normal.      Nose: Rhinorrhea present.      Mouth/Throat:      Mouth: Mucous membranes are moist.      Pharynx: Oropharyngeal exudate and posterior oropharyngeal erythema present.   Eyes:      General:         Right eye: No discharge.         Left eye: No discharge.      Conjunctiva/sclera: Conjunctivae normal.   Cardiovascular:      Rate and Rhythm: Normal rate and regular rhythm.      Heart sounds: Normal heart sounds.   Pulmonary:      Effort: Pulmonary effort is normal.      Breath sounds: Normal breath sounds. No wheezing, rhonchi or rales.   Musculoskeletal:      Cervical back: Neck supple.   Lymphadenopathy:      Cervical: No cervical adenopathy.   Skin:     General: Skin is warm and dry.   Neurological:      Mental Status: She is alert and oriented to person, place, and time.         ASSESSMENT AND PLAN:   Diagnoses and all orders for this visit:    Strep pharyngitis  -     Discontinue: azithromycin 250 MG Oral Tab; Take 2 tablets (500 mg total) by mouth daily for 1 day, THEN 1 tablet (250 mg total) daily for 4 days.  -     azithromycin 250 MG Oral Tab; Take 500mg by mouth daily x 5 days.    Sore throat  -     Rapid Strep  -     SARS-CoV-2/Flu A and B/RSV by PCR ([facility]) [E]; Future  -     benzonatate 200 MG Oral Cap; Take 1 capsule (200 mg total) by mouth 3 (three) times daily as needed for cough.     -Patient informed of positive rapid strep.  Medication sent to pharmacy.  Viral respiratory panel also sent.  Recommend over  the counter treatment of symptoms:    -Drink extra water and fluids  -Use a cool mist vaporizer or saline nasal spray to relieve congestion  -use ice chips, sore throat sprays and lozenges  -1 tsp. Honey for cough   -Analgesics/ antipyretics such as Tylenol or Ibuprofen should be used for relief of headaches, ear pain, muscle/joint pain, and  general malaise  -Intranasal products can improve the nasal symptoms associated with a common cold.    -Return for strep culture after completion of antibiotics, sooner as needed, call with questions.  -Patient verbalized understanding and in agreement with plan.    30 minutes were spent on assessment, education, and discussion of plan    CHAMP Odonnell

## 2025-02-27 LAB
FLUAV + FLUBV RNA SPEC NAA+PROBE: DETECTED
FLUAV + FLUBV RNA SPEC NAA+PROBE: NOT DETECTED
RSV RNA SPEC NAA+PROBE: NOT DETECTED
SARS-COV-2 RNA RESP QL NAA+PROBE: NOT DETECTED

## 2025-03-07 ENCOUNTER — OFFICE VISIT (OUTPATIENT)
Dept: FAMILY MEDICINE CLINIC | Facility: CLINIC | Age: 65
End: 2025-03-07
Payer: MEDICARE

## 2025-03-07 VITALS
SYSTOLIC BLOOD PRESSURE: 110 MMHG | BODY MASS INDEX: 22 KG/M2 | DIASTOLIC BLOOD PRESSURE: 68 MMHG | HEART RATE: 60 BPM | TEMPERATURE: 99 F | OXYGEN SATURATION: 98 % | WEIGHT: 120 LBS

## 2025-03-07 DIAGNOSIS — J10.1 INFLUENZA A: ICD-10-CM

## 2025-03-07 DIAGNOSIS — J02.0 STREP PHARYNGITIS: Primary | ICD-10-CM

## 2025-03-07 PROCEDURE — 99213 OFFICE O/P EST LOW 20 MIN: CPT

## 2025-03-07 NOTE — PROGRESS NOTES
HPI:   Criss is a 65 yr. Old female here today for follow up.  Patient seen on 2/27/25 had positive rapid strep and viral respiratory panel was positive for Influenza A. Completed course of azithromycin. Patient presents today reports she is feeling much better, recently resumed her job as  this week.  Denies complaints.         Current Outpatient Medications   Medication Sig Dispense Refill    benzonatate 200 MG Oral Cap Take 1 capsule (200 mg total) by mouth 3 (three) times daily as needed for cough. 30 capsule 0    NYSTATIN [id number] units Oral Tab TAKE 1 TABLET DAILY 90 tablet 3    albuterol (2.5 MG/3ML) 0.083% Inhalation Nebu Soln Take 3 mL (2.5 mg total) by nebulization every 6 (six) hours as needed for Wheezing or Shortness of Breath (cough). 360 each 0    cyclobenzaprine 10 MG Oral Tab Take 1 tablet (10 mg total) by mouth nightly.      diclofenac 75 MG Oral Tab EC Take 1 tablet (75 mg total) by mouth 2 (two) times daily.      BUPROPION  MG Oral Tablet 12 Hr TAKE 1 TABLET DAILY 90 tablet 1    EPINEPHrine 0.3 MG/0.3ML Injection Solution Auto-injector       acetaminophen 500 MG Oral Tab Take 2 tablets (1,000 mg total) by mouth 3 (three) times daily. 90 tablet 0    ondansetron (ZOFRAN) 4 mg tablet Take 1 tablet (4 mg total) by mouth every 8 (eight) hours as needed for Nausea. 30 tablet 1    Estradiol-Norethindrone Acet (COMBIPATCH) 0.05-0.14 MG/DAY Transdermal Patch Biweekly Place 1 patch onto the skin once a week. 12 patch 1    Multiple Vitamins-Minerals (MULTI-VITAMIN/MINERALS) Oral Tab Take 1 tablet by mouth daily.      ferrous sulfate 325 (65 FE) MG Oral Tab EC Take 1 tablet (325 mg total) by mouth daily as needed. Before donating blood      Wheat Dextrin (BENEFIBER ON THE GO) Oral Powder Take 1 packet by mouth daily.      albuterol (2.5 MG/3ML) 0.083% Inhalation Nebu Soln Take 3 mL (2.5 mg total) by nebulization 4 (four) times daily. 75 mL 2    Azelastine-Fluticasone 137-50 MCG/ACT  Nasal Suspension 1 spray by Nasal route 2 (two) times daily.      budesonide 0.5 MG/2ML Inhalation Suspension Take 2 mL (0.5 mg total) by nebulization 2 (two) times daily. 30 ampule 0      Past Medical History:    Asthma (HCC)    COVID    No hospitalization. Lost taste /smell, HA    Osteoarthritis    Visual impairment    glasses      Past Surgical History:   Procedure Laterality Date    Adenoidectomy      Colonoscopy      Other surgical history Left     knee sx for torn cartilage    Other surgical history Right     Hammer toe sx    Other surgical history      Nasal sx    Other surgical history      dental implants    Other surgical history      bladder sling      Family History   Problem Relation Age of Onset    Diabetes Father     Heart Disorder Father     Colon Cancer Mother     Cancer Sister       Social History     Socioeconomic History    Marital status:    Occupational History    Occupation:    Tobacco Use    Smoking status: Never    Smokeless tobacco: Never   Vaping Use    Vaping status: Never Used   Substance and Sexual Activity    Alcohol use: Never    Drug use: Never     Social Drivers of Health      [facility]    Housing Stability         REVIEW OF SYSTEMS:   Review of Systems   Constitutional:  Negative for chills, fatigue and fever.   HENT:  Negative for congestion, ear pain, postnasal drip, rhinorrhea, sinus pressure, sore throat and trouble swallowing.    Respiratory:  Negative for cough, chest tightness and shortness of breath.    Cardiovascular: Negative.    Gastrointestinal: Negative.    Neurological: Negative.        EXAM:   /68   Pulse 60   Temp 98.6 °F (37 °C) (Temporal)   Wt 120 lb (54.4 kg)   LMP 05/11/2014 (Exact Date)   SpO2 98%   BMI 21.60 kg/m²   Physical Exam  Vitals reviewed.   Constitutional:       General: She is not in acute distress.     Appearance: Normal appearance. She is not  ill-appearing.   HENT:      Head: Atraumatic.      Right Ear: Tympanic membrane, ear canal and external ear normal.      Left Ear: Tympanic membrane, ear canal and external ear normal.      Nose: Nose normal.      Mouth/Throat:      Mouth: Mucous membranes are moist.      Pharynx: Oropharynx is clear.   Eyes:      General:         Right eye: No discharge.         Left eye: No discharge.      Conjunctiva/sclera: Conjunctivae normal.   Cardiovascular:      Rate and Rhythm: Normal rate and regular rhythm.      Heart sounds: Normal heart sounds.   Pulmonary:      Effort: Pulmonary effort is normal.      Breath sounds: Normal breath sounds. No wheezing, rhonchi or rales.   Musculoskeletal:      Cervical back: Neck supple.   Skin:     General: Skin is warm and dry.   Neurological:      Mental Status: She is alert and oriented to person, place, and time.         ASSESSMENT AND PLAN:   Diagnoses and all orders for this visit:    Strep pharyngitis    Influenza A     -Resolved.  Recommend practice good hand hygiene and mask as needed.  -Return as needed, call with questions.  -Patient verbalized understanding and in agreement with plan.    20 minutes were spent on assessment, education, and discussion of plan.    CHAMP Odonnell

## 2025-04-09 ENCOUNTER — PATIENT MESSAGE (OUTPATIENT)
Dept: FAMILY MEDICINE CLINIC | Facility: CLINIC | Age: 65
End: 2025-04-09

## 2025-04-09 DIAGNOSIS — D64.9 ANEMIA, UNSPECIFIED TYPE: ICD-10-CM

## 2025-04-09 DIAGNOSIS — Z13.220 SCREENING, LIPID: ICD-10-CM

## 2025-04-09 DIAGNOSIS — R73.9 HYPERGLYCEMIA: ICD-10-CM

## 2025-04-09 DIAGNOSIS — Z13.1 SCREENING FOR DIABETES MELLITUS: Primary | ICD-10-CM

## 2025-04-09 DIAGNOSIS — Z13.29 SCREENING FOR THYROID DISORDER: ICD-10-CM

## 2025-04-09 DIAGNOSIS — Z12.31 ENCOUNTER FOR SCREENING MAMMOGRAM FOR BREAST CANCER: ICD-10-CM

## 2025-04-09 DIAGNOSIS — Z00.00 LABORATORY EXAMINATION ORDERED AS PART OF A ROUTINE GENERAL MEDICAL EXAMINATION: ICD-10-CM

## 2025-04-09 DIAGNOSIS — N60.01 CYST OF RIGHT BREAST: ICD-10-CM

## 2025-04-10 RX ORDER — FERROUS SULFATE 325(65) MG
325 TABLET, DELAYED RELEASE (ENTERIC COATED) ORAL DAILY PRN
Qty: 30 TABLET | Refills: 3 | Status: SHIPPED | OUTPATIENT
Start: 2025-04-10

## 2025-04-10 NOTE — TELEPHONE ENCOUNTER
Pt asking for ultrasound also.     She the 12/12/24 report---it looks like ultrasound was to repeated 6 months also?  (Please confirm)

## 2025-04-10 NOTE — TELEPHONE ENCOUNTER
ferrous sulfate 325 (65 FE) MG Oral Tab EC   Last office visit:  5/7/24    Future Appointments   Date Time Provider Department Center   5/5/2025  9:20 AM Neftaly Rangel MD [facility] [place]   Last filled:  do not show we filled   Last labs: 5/13/24 lipid, A1C, CMP, TSH, FT4  Last BP: 110/68

## 2025-04-16 LAB
ABSOLUTE BASOPHILS: 80 CELLS/UL (ref 0–200)
ABSOLUTE EOSINOPHILS: 425 CELLS/UL (ref 15–500)
ABSOLUTE LYMPHOCYTES: 1705 CELLS/UL (ref 850–3900)
ABSOLUTE MONOCYTES: 515 CELLS/UL (ref 200–950)
ABSOLUTE NEUTROPHILS: 2275 CELLS/UL (ref 1500–7800)
ALBUMIN/GLOBULIN RATIO: 1.6 (CALC) (ref 1–2.5)
ALBUMIN: 4.3 G/DL (ref 3.6–5.1)
ALKALINE PHOSPHATASE: 79 U/L (ref 37–153)
ALT: 19 U/L (ref 6–29)
AST: 25 U/L (ref 10–35)
BASOPHILS: 1.6 %
BILIRUBIN, TOTAL: 0.3 MG/DL (ref 0.2–1.2)
BUN: 12 MG/DL (ref 7–25)
CALCIUM: 9.7 MG/DL (ref 8.6–10.4)
CARBON DIOXIDE: 32 MMOL/L (ref 20–32)
CHLORIDE: 103 MMOL/L (ref 98–110)
CHOL/HDLC RATIO: 2.4 (CALC)
CHOLESTEROL, TOTAL: 210 MG/DL
CREATININE: 0.78 MG/DL (ref 0.5–1.05)
EGFR: 83 ML/MIN/1.73M2
EOSINOPHILS: 8.5 %
GLOBULIN: 2.7 G/DL (CALC) (ref 1.9–3.7)
GLUCOSE: 88 MG/DL (ref 65–99)
HDL CHOLESTEROL: 86 MG/DL
HEMATOCRIT: 41.3 % (ref 35–45)
HEMOGLOBIN A1C: 5.1 %
HEMOGLOBIN: 14.3 G/DL (ref 11.7–15.5)
LDL-CHOLESTEROL: 107 MG/DL (CALC)
LYMPHOCYTES: 34.1 %
MCH: 33.7 PG (ref 27–33)
MCHC: 34.6 G/DL (ref 32–36)
MCV: 97.4 FL (ref 80–100)
MONOCYTES: 10.3 %
MPV: 11.1 FL (ref 7.5–12.5)
NEUTROPHILS: 45.5 %
NON-HDL CHOLESTEROL: 124 MG/DL (CALC)
PLATELET COUNT: 245 THOUSAND/UL (ref 140–400)
POTASSIUM: 5.2 MMOL/L (ref 3.5–5.3)
PROTEIN, TOTAL: 7 G/DL (ref 6.1–8.1)
RDW: 12.8 % (ref 11–15)
RED BLOOD CELL COUNT: 4.24 MILLION/UL (ref 3.8–5.1)
SODIUM: 139 MMOL/L (ref 135–146)
T4, FREE: 1.1 NG/DL (ref 0.8–1.8)
TRIGLYCERIDES: 79 MG/DL
TSH: 1.94 MIU/L (ref 0.4–4.5)
WHITE BLOOD CELL COUNT: 5 THOUSAND/UL (ref 3.8–10.8)

## 2025-05-05 ENCOUNTER — OFFICE VISIT (OUTPATIENT)
Dept: FAMILY MEDICINE CLINIC | Facility: CLINIC | Age: 65
End: 2025-05-05
Payer: MEDICARE

## 2025-05-05 VITALS
HEIGHT: 62.5 IN | WEIGHT: 118.25 LBS | DIASTOLIC BLOOD PRESSURE: 81 MMHG | SYSTOLIC BLOOD PRESSURE: 136 MMHG | HEART RATE: 68 BPM | TEMPERATURE: 98 F | RESPIRATION RATE: 12 BRPM | BODY MASS INDEX: 21.22 KG/M2 | OXYGEN SATURATION: 99 %

## 2025-05-05 DIAGNOSIS — Z00.00 MEDICARE ANNUAL WELLNESS VISIT, SUBSEQUENT: Primary | ICD-10-CM

## 2025-05-05 DIAGNOSIS — J30.1 SEASONAL ALLERGIC RHINITIS DUE TO POLLEN: ICD-10-CM

## 2025-05-05 DIAGNOSIS — J45.21 MILD INTERMITTENT REACTIVE AIRWAY DISEASE WITH ACUTE EXACERBATION (HCC): ICD-10-CM

## 2025-05-05 PROCEDURE — G0439 PPPS, SUBSEQ VISIT: HCPCS | Performed by: FAMILY MEDICINE

## 2025-05-05 RX ORDER — ESTRADIOL/NORETHINDRONE ACETATE TRANSDERMAL SYSTEM .05; .14 MG/D; MG/D
1 PATCH, EXTENDED RELEASE TRANSDERMAL
COMMUNITY
Start: 2025-05-05

## 2025-05-05 NOTE — PROGRESS NOTES
Subjective:   Criss Murphy is a 65 year old female who presents for a Subsequent Annual Wellness visit (Pt already had Initial Annual Wellness) and scheduled follow up of multiple significant but stable problems.   History of Present Illness  Criss Murphy is a 65 year old female who presents for routine follow-up and monitoring of breast health.    She has a history of breast biopsies and MRIs on both breasts over the past three to four years. She is concerned about the current ultrasound scheduling, which only includes one breast at a time, despite both having had previous issues. She questions the rationale behind not examining both breasts simultaneously.    She experienced a two-month period of persistent cough following a strep throat infection, which required antibiotics for resolution. 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 She has since recovered and feels healthy again.    She is on arthritis medication and recently had blood work done to monitor its effects. Her kidney function and cholesterol levels are reported as good, with an HDL of 86 and LDL of 107.    She has resumed allergy shots after a hiatus due to her previous allergist retiring during the pandemic. She believes the shots help reduce the severity of colds and improve her baseline health, especially given her exposure to allergens at work and at home with cats.      History/Other:   Fall Risk Assessment:   She has been screened for Falls and is low risk.      Cognitive Assessment:   She had a completely normal cognitive assessment - see flowsheet entries     Functional Ability/Status:   Criss Murphy has a completely normal functional assessment. See flowsheet for details.      Depression Screening (PHQ):  PHQ-2 SCORE: 0  , done 4/28/2025             Advanced Directives:   She does NOT have a Living  Will. [Do you have a living will?: (Patient-Rptd) No]  She does NOT have a Power of  for Health Care. [Do you have a healthcare power of ?: (Patient-Rptd) No]  Discussed Advance Care Planning with patient (and family/surrogate if present). Standard forms made available to patient in After Visit Summary.      Problem List[1]  Allergies:  She is allergic to peanuts, tree nuts, cat hair extract, dander, pcn [penicillins], and seasonal.    Current Medications:  Active Meds, Sig Only[2]    Medical History:  She  has a past medical history of Asthma ([facility]), COVID (12/19/2020), Osteoarthritis, and Visual impairment.  Surgical History:  She  has a past surgical history that includes adenoidectomy; colonoscopy; other surgical history (Left); other surgical history (Right); other surgical history; other surgical history; and other surgical history.   Family History:  Her family history includes Cancer in her sister; Colon Cancer in her mother; Diabetes in her father; Heart Disorder in her father.  Social History:  She  reports that she has never smoked. She has never used smokeless tobacco. She reports that she does not drink alcohol and does not use drugs.    Tobacco:  She has never smoked tobacco.    CAGE Alcohol Screen:   CAGE screening score of 0 on 4/28/2025, showing low risk of alcohol abuse.      Patient Care Team:  Neftaly Rangel MD as PCP - General (Family Medicine)  Miles Madera, PT as Physical Therapist  Shanell Castaneda PT as Physical Therapist (Physical Therapy)  Tatum Reid APRN (Nurse Practitioner Family)    Review of Systems       Objective:   Physical Exam       /81 (BP Location: Left arm, Patient Position: Sitting, Cuff Size: adult)   Pulse 68   Temp 98.1 °F (36.7 °C) (Temporal)   Resp 12   Ht 5' 2.5\" (1.588 m)   Wt 118 lb 4 oz (53.6 kg)   LMP 05/11/2014 (Exact Date)   SpO2 99%   BMI 21.28 kg/m²  Estimated body mass index is 21.28 kg/m² as calculated from the following:     Height as of this encounter: 5' 2.5\" (1.588 m).    Weight as of this encounter: 118 lb 4 oz (53.6 kg).    Medicare Hearing Assessment:   Hearing Screening    Time taken: 5/5/2025  9:11 AM  Entry User: Godfrey Pantoja MA  Screening Method: Whisper Test  Whisper Test Result: Pass         Visual Acuity:   Right Eye Visual Acuity: Corrected Right Eye Chart Acuity: 20/40   Left Eye Visual Acuity: Corrected Left Eye Chart Acuity: 20/40   Both Eyes Visual Acuity: Corrected Both Eyes Chart Acuity: 20/40   Able To Tolerate Visual Acuity: Yes        Assessment & Plan:   Criss Murphy is a 65 year old female who presents for a Medicare Assessment.     1. Medicare annual wellness visit, subsequent (Primary)  2. Mild intermittent reactive airway disease with acute exacerbation (HCC)  Overview:  Medications       Steroid Inhalants Instructions     budesonide 0.5 MG/2ML Inhalation Suspension Take 2 mL (0.5 mg total) by nebulization 2 (two) times daily.       Sympathomimetics Instructions     albuterol (PROAIR HFA) 108 (90 Base) MCG/ACT Inhalation Aero Soln Inhale 2 puffs into the lungs every 6 (six) hours as needed for Wheezing.     albuterol (2.5 MG/3ML) 0.083% Inhalation Nebu Soln Take 3 mL (2.5 mg total) by nebulization 4 (four) times daily.            Assessment & Plan:   Stable    [x] chronic stable condition, noted historically, continues present status    3. Seasonal allergic rhinitis due to pollen  Assessment & Plan:   Stable    [x] chronic stable condition, noted historically, continues present status      Assessment & Plan  Wellness Visit  Routine wellness visit. Normal kidney function and cholesterol levels. Discussed breast ultrasound scheduling and imaging practices. Good health with improved baseline condition from allergy shots.  - Continue current medications and treatments as prescribed.  - Continue allergy shots as per allergist's recommendations.  - Monitor breast health with scheduled imaging  and follow-up as needed.    Allergic rhinitis  Improved symptoms and reduced severity of colds with allergy shots.  - Continue allergy shots as per allergist's recommendations.  The patient indicates understanding of these issues and agrees to the plan.  Continue with current treatment plan.  Reinforced healthy diet, lifestyle, and exercise.      No follow-ups on file.     Neftaly Rangel MD, 5/5/2025     Supplementary Documentation:   General Health:  In the past six months, have you lost more than 10 pounds without trying?: (Patient-Rptd) 2 - No  Has your appetite been poor?: (Patient-Rptd) No  Type of Diet: (Patient-Rptd) Balanced  How does the patient maintain a good energy level?: (Patient-Rptd) Appropriate Exercise  How would you describe your daily physical activity?: (Patient-Rptd) Moderate  How would you describe your current health state?: (Patient-Rptd) Good  How do you maintain positive mental well-being?: (Patient-Rptd) Social Interaction, Visiting Friends, Visiting Family  On a scale of 0 to 10, with 0 being no pain and 10 being severe pain, what is your pain level?: (Patient-Rptd) 2 - (Mild)  In the past six months, have you experienced urine leakage?: (Patient-Rptd) 0-No  At any time do you feel concerned for the safety/well-being of yourself and/or your children, in your home or elsewhere?: (Patient-Rptd) No  Have you had any immunizations at another office such as Influenza, Hepatitis B, Tetanus, or Pneumococcal?: (Patient-Rptd) No    Health Maintenance   Topic Date Due    COVID-19 Vaccine (3 - 2024-25 season) 09/01/2024    Pap Smear  05/06/2025    Annual Physical  05/07/2025    Mammogram  06/14/2025    Colorectal Cancer Screening  01/19/2026    Influenza Vaccine  Completed    DEXA Scan  Completed    Annual Depression Screening  Completed    Fall Risk Screening (Annual)  Completed    Pneumococcal Vaccine: 50+ Years  Completed    Zoster Vaccines  Completed    Meningococcal B Vaccine  Aged Out             [1]   Patient Active Problem List  Diagnosis    RAD (reactive airway disease) (HCC)    Allergic rhinitis   [2]   Outpatient Medications Marked as Taking for the 5/5/25 encounter (Office Visit) with Neftaly Rangel MD   Medication Sig    Estradiol-Norethindrone Acet (COMBIPATCH) 0.05-0.14 MG/DAY Transdermal Patch Biweekly Place 1 patch onto the skin twice a week.    benzonatate 200 MG Oral Cap Take 1 capsule (200 mg total) by mouth 3 (three) times daily as needed for cough.    NYSTATIN [id number] units Oral Tab TAKE 1 TABLET DAILY    cyclobenzaprine 10 MG Oral Tab Take 1 tablet (10 mg total) by mouth nightly.    diclofenac 75 MG Oral Tab EC Take 1 tablet (75 mg total) by mouth 2 (two) times daily.    BUPROPION  MG Oral Tablet 12 Hr TAKE 1 TABLET DAILY    EPINEPHrine 0.3 MG/0.3ML Injection Solution Auto-injector     acetaminophen 500 MG Oral Tab Take 2 tablets (1,000 mg total) by mouth 3 (three) times daily.    ondansetron (ZOFRAN) 4 mg tablet Take 1 tablet (4 mg total) by mouth every 8 (eight) hours as needed for Nausea.    Multiple Vitamins-Minerals (MULTI-VITAMIN/MINERALS) Oral Tab Take 1 tablet by mouth daily.    Wheat Dextrin (BENEFIBER ON THE GO) Oral Powder Take 1 packet by mouth daily.    albuterol (2.5 MG/3ML) 0.083% Inhalation Nebu Soln Take 3 mL (2.5 mg total) by nebulization 4 (four) times daily.    Azelastine-Fluticasone 137-50 MCG/ACT Nasal Suspension 1 spray by Nasal route 2 (two) times daily.    budesonide 0.5 MG/2ML Inhalation Suspension Take 2 mL (0.5 mg total) by nebulization 2 (two) times daily.

## 2025-05-05 NOTE — PROGRESS NOTES
The following individual(s) verbally consented to be recorded using ambient AI listening technology and understand that they can each withdraw their consent to this listening technology at any point by asking the clinician to turn off or pause the recording:    Patient name: Criss Murphy  Additional names:

## 2025-05-19 ENCOUNTER — PATIENT MESSAGE (OUTPATIENT)
Dept: FAMILY MEDICINE CLINIC | Facility: CLINIC | Age: 65
End: 2025-05-19

## 2025-05-19 RX ORDER — ALBUTEROL SULFATE 90 UG/1
2 INHALANT RESPIRATORY (INHALATION) EVERY 6 HOURS PRN
Qty: 1 EACH | Refills: 5 | Status: SHIPPED | OUTPATIENT
Start: 2025-05-19 | End: 2026-05-19

## 2025-05-19 NOTE — TELEPHONE ENCOUNTER
Request for refill on albuterol inhaler    LOV  5-5-25    LAST LAB  4-15-25    LAST RX  9-9-23  #1 RF 5    Next OV  No future appointments.    PROTOCOL      Asthma & COPD Medication Protocol [id number] 02:47 PM   Protocol Details ACT Score greater than or equal to 20    ACT recorded in the last 12 months    Medication is active on med list    Appointment in past 6 or next 3 months

## 2025-06-07 DIAGNOSIS — F41.9 ANXIETY: ICD-10-CM

## 2025-06-07 RX ORDER — BUPROPION HYDROCHLORIDE 150 MG/1
150 TABLET, EXTENDED RELEASE ORAL DAILY
Qty: 90 TABLET | Refills: 1 | Status: SHIPPED | OUTPATIENT
Start: 2025-06-07

## 2025-07-09 ENCOUNTER — TELEPHONE (OUTPATIENT)
Dept: FAMILY MEDICINE CLINIC | Facility: CLINIC | Age: 65
End: 2025-07-09

## 2025-07-09 NOTE — TELEPHONE ENCOUNTER
Received patient's diagnostic mammogram results. Results reviewed by Dr. Rangel, and after careful review, he states \"6 month follow up right mammogram and ultrasound\". Spoke with patient regarding results and she verbalized understanding.

## 2025-07-10 ENCOUNTER — PATIENT MESSAGE (OUTPATIENT)
Dept: FAMILY MEDICINE CLINIC | Facility: CLINIC | Age: 65
End: 2025-07-10

## 2025-07-10 ENCOUNTER — ORDER TRANSCRIPTION (OUTPATIENT)
Dept: PHYSICAL THERAPY | Facility: HOSPITAL | Age: 65
End: 2025-07-10

## 2025-07-10 ENCOUNTER — TELEPHONE (OUTPATIENT)
Dept: FAMILY MEDICINE CLINIC | Facility: CLINIC | Age: 65
End: 2025-07-10

## 2025-07-10 DIAGNOSIS — Z96.642 S/P TOTAL LEFT HIP ARTHROPLASTY: ICD-10-CM

## 2025-07-10 DIAGNOSIS — M16.12 PRIMARY OSTEOARTHRITIS OF LEFT HIP: Primary | ICD-10-CM

## 2025-07-10 NOTE — TELEPHONE ENCOUNTER
Call placed to Dr Greene's office and requested pre op information be faxed to office, awaiting fax

## 2025-07-10 NOTE — TELEPHONE ENCOUNTER
Pre Op paperwork received from Dr Greene, patient contacted and pre op appointment scheduled:    Future Appointments   Date Time Provider Department Center   9/8/2025  1:00 PM Neftaly Rangel MD [facility] [place]

## 2025-07-10 NOTE — TELEPHONE ENCOUNTER
See telephone encounter dated today, patient was contacted and pre op appointment scheduled:  Future Appointments   Date Time Provider Department Center   9/8/2025  1:00 PM Neftaly Rangel MD [facility]

## 2025-07-11 ENCOUNTER — TELEPHONE (OUTPATIENT)
Dept: PHYSICAL THERAPY | Facility: HOSPITAL | Age: 65
End: 2025-07-11

## (undated) DIAGNOSIS — Z79.1 NSAID LONG-TERM USE: Primary | ICD-10-CM

## (undated) DIAGNOSIS — N60.09 COMPLEX CYST OF BREAST: ICD-10-CM

## (undated) DIAGNOSIS — N60.09 COMPLEX CYST OF BREAST: Primary | ICD-10-CM

## (undated) DIAGNOSIS — R92.8 ABNORMAL MAMMOGRAM: Primary | ICD-10-CM

## (undated) DIAGNOSIS — F41.9 ANXIETY: ICD-10-CM

## (undated) DEVICE — BIPOLAR SEALER 23-112-1 AQM 6.0: Brand: AQUAMANTYS™

## (undated) DEVICE — STOCK SURG XL 48X12IN

## (undated) DEVICE — NEPTUNE E-SEP SMOKE EVACUATION PENCIL, COATED, 70MM BLADE, PUSH BUTTON SWITCH: Brand: NEPTUNE E-SEP

## (undated) DEVICE — DRAPE,U/SHT,SPLIT,FILM,60X84,STERILE: Brand: MEDLINE

## (undated) DEVICE — E-Z BUTTON SWITCH PENCIL

## (undated) DEVICE — BNDG,ELSTC,MATRIX,STRL,4"X5YD,LF,HOOK&LP: Brand: MEDLINE

## (undated) DEVICE — COVER,LIGHT,CAMERA,HARD,1/PK,STRL: Brand: MEDLINE

## (undated) DEVICE — UNIVERSAL STERIBUMP® STERILE (5/CASE): Brand: UNIVERSAL STERIBUMP®

## (undated) DEVICE — SUT VCRL + 1 27IN ABSRB UD OS-6 L36MM

## (undated) DEVICE — SIGMA LCS HIGH PERFORMANCE STERILE THREADED HEADED PINS: Brand: SIGMA LCS HIGH PERFORMANCE

## (undated) DEVICE — ANTIBACTERIAL UNDYED BRAIDED (POLYGLACTIN 910), SYNTHETIC ABSORBABLE SUTURE: Brand: COATED VICRYL

## (undated) DEVICE — DISPOSABLE TOURNIQUET CUFF SINGLE BLADDER, DUAL PORT AND QUICK CONNECT CONNECTOR: Brand: COLOR CUFF

## (undated) DEVICE — STOCKINETTE,IMPERVIOUS,12X48,STERILE: Brand: MEDLINE

## (undated) DEVICE — SOLUTION  .9 3000ML

## (undated) DEVICE — NEEDLE SPNL 18GA L3.5IN PNK QNCKE STYL DISP

## (undated) DEVICE — Device: Brand: STABLECUT®

## (undated) DEVICE — STERILE POLYISOPRENE POWDER-FREE SURGICAL GLOVES: Brand: PROTEXIS

## (undated) DEVICE — GOWN SUR 2XL LEV 4 BLU W/ WHT V NK BND AERO

## (undated) DEVICE — SOL NACL IRRIG 0.9% 1000ML BTL

## (undated) DEVICE — STRYKER PERFORMANCE SERIES SAGITTAL BLADE: Brand: STRYKER PERFORMANCE SERIES

## (undated) DEVICE — SYRINGE MED 30ML STD CLR PLAS LL TIP N CTRL

## (undated) DEVICE — TOTAL KNEE CDS: Brand: MEDLINE INDUSTRIES, INC.

## (undated) DEVICE — STERILE POLYISOPRENE POWDER-FREE SURGICAL GLOVES WITH EMOLLIENT COATING: Brand: PROTEXIS

## (undated) DEVICE — HOOD: Brand: FLYTE

## (undated) DEVICE — WRAP COMPR UNIV KNEE HOT CLD GEL MICWV AND

## (undated) DEVICE — GLOVE SUR 8 SENSICARE PI PIP CRM PWD F

## (undated) DEVICE — GOWN AERO CHROME XXL

## (undated) DEVICE — HOOD, PEEL-AWAY: Brand: FLYTE

## (undated) DEVICE — SIGMA LCS HIGH PERFORMANCE INSTRUMENTS STERILE THREADED PINS: Brand: SIGMA LCS HIGH PERFORMANCE

## (undated) DEVICE — 450 ML BOTTLE OF 0.05% CHLORHEXIDINE GLUCONATE IN 99.95% STERILE WATER FOR IRRIGATION, USP AND APPLICATOR.: Brand: IRRISEPT ANTIMICROBIAL WOUND LAVAGE

## (undated) DEVICE — SLEEVE COMPR MD KNEE LEN SGL USE KENDALL SCD

## (undated) DEVICE — LIGHT HANDLE

## (undated) DEVICE — STERILE HOOK LOCK LATEX FREE ELASTIC BANDAGE 4INX5YD: Brand: HOOK LOCK™

## (undated) DEVICE — 2T11 #2 PDO 36 X 36: Brand: 2T11 #2 PDO 36 X 36

## (undated) DEVICE — RECIPROCATING BLADE, DOUBLE SIDED, OFFSET  (70.0 X 1.0 X 12.5MM)

## (undated) DEVICE — SOLUTION IRRIG 3000ML 0.9% NACL FLX CONT

## (undated) DEVICE — SLEEVE KENDALL SCD EXPRESS MED

## (undated) DEVICE — NEEDLE SPINAL 18X3-1/2 PINK.

## (undated) DEVICE — SUT VICRYL 2-0 CP-1 J266H

## (undated) DEVICE — GLOVE SUR 8.5 SENSICARE PI PIP GRN PWD F

## (undated) DEVICE — 3 BONE CEMENT MIXER: Brand: MIXEVAC

## (undated) DEVICE — GLOVE SUR 8.5 SENSICARE PI PIP CRM PWD F

## (undated) DEVICE — WRAP COOLING KNEE W/ICE PILLOW

## (undated) DEVICE — BOWL CEMENT MIX QUICK-VAC

## (undated) DEVICE — SUT VICRYL 1 OS-6 J535H

## (undated) DEVICE — SYRINGE 30ML LL TIP

## (undated) NOTE — LETTER
OUTSIDE TESTING RESULT REQUEST     IMPORTANT: FOR YOUR IMMEDIATE ATTENTION  Please FAX all test results listed below to: [phone]     Testing already done on or about: appt 3/1/23    * * * * If testing is NOT complete, arrange with patient A.S.A.P. * * * *      Patient Name: Aisha Nieves  Surgery Date: 3/15/2023  CSN: [id number]  Medical Record: [id number]   : 1960 - A: 70 y      Sex: female  Surgeon(s):  Bill Peterson MD  Procedure: RIGHT TOTAL KNEE ARTHROPLASTY  Anesthesia Type: Choice     Surgeon: Bill Peterson MD     The following Testing and Time Line are REQUIRED PER ANESTHESIA     EKG READ AND SIGNED WITHIN   90 days  CBC [with Differential & Platelets] within  90 days  CMP (requires 4 hour fast) within  90 days  PT/INR within  30 days  PTT within  30 days  Type and Screen for Pre-Admission Testing (must be within 28 days of surgery)  MSSA/MRSA Nasal screening within 30 days      Thank You,   Sent by:Abbi

## (undated) NOTE — MR AVS SNAPSHOT
Jerry Chawla  [address] [facility] [address]  [phone]               Thank you for choosing us for your health care visit with Eamon Shell DO.   We are glad to serve you and happy to provide you with this summ view more details from this visit by going to [email or web address]. If you've recently had a stay at the Hospital you can access your discharge instructions in [facility] by going to Visits < Admission Summaries.  If you've been to the Emergency Depar

## (undated) NOTE — LETTER
Patient Name: Criss Murphy  YOB: 1960          MRN number:  [id number]  Date:  12/31/2024  Referring Physician:  Kiet Greene      Dear Dr. Greene,    Discharge Summary  Pt has attended 9 visits in Physical Therapy.    Diagnosis:   L TKR      Referring Provider: Kiet Greene  Date of Evaluation:    11/11/2024    Precautions:  None Next MD visit:   none scheduled  Date of Surgery: 10/16/2024   Insurance Primary/Secondary: MEDICARE / [employer]     # Auth Visits: Med necessity            Subjective: Knee feels good overall, still get some swelling when on feet for prolonged time periods.    Pain: 1/10      Objective:   AROM: (* denotes performed with pain)   Knee    Flexion: R 120; L 120   Extension: R 0; L 0      PROM: (* denotes performed with pain)   Knee    Flexion: R 126; L 5   Extension: R 0; L 0          Assessment: Full and functional ROM. Functional strength and balance. Patient has resumed all ADL activity.       Goals:   (To be met in 20 visits)   Pt will improve knee extension ROM to 0 deg to allow proper heel strike during gait and terminal knee extension in stance-Met  Pt will improve knee AROM flexion to >120 degrees to improve ability to perform squats to retrieve objects off the floor.-Met  Pt will improve quad strength to 5/5 to ascend 1 flight of stairs reciprocally without UE assist-Met  Pt will increase hip and knee strength to grossly 4+/5 to be able to get up and down from the floor safely-Met  Pt will demonstrate increased hip ER/ABD strength to 5/5 to perform stepping and squatting activities without excessive femoral IR/ADD-Met  Pt will improve SLS to >20s to improve safety and independence with gait on uneven surfaces such as grass-Met  Pt will be independent and compliant with comprehensive HEP to maintain progress achieved in PT-Met    Post LEFS Score  Post LEFS Score: 82.5 % (12/31/2024  9:08 AM)    38.75 % improvement    Plan: DC to HEP        Patient/Family/Caregiver was advised of these findings, precautions, and treatment options and has agreed to actively participate in planning and for this course of care.    Thank you for your referral. If you have any questions, please contact me at Dept: [phone].    Sincerely,  Electronically signed by therapist: Miles Madera PT     Physician's certification required:  Yes  Please co-sign or sign and return this letter via fax as soon as possible to [phone].   I certify the need for these services furnished under this plan of treatment and while under my care.    X___________________________________________________ Date____________________    Certification From: 12/31/2024  To:3/31/2025      21st Century Cures Act Notice to Patient: Medical documents like this are made available to patients in the interest of transparency. However, be advised this is a medical document and it is intended as dozs-zt-pymf communication between your medical providers. This medical document may contain abbreviations, assessments, medical data, and results or other terms that are unfamiliar. Medical documents are intended to carry relevant information, facts as evident, and the clinical opinion of the practitioner. As such, this medical document may be written in language that appears blunt or direct. You are encouraged to contact your medical provider and/or [facility] Patient Experience if you have any questions about this medical document.

## (undated) NOTE — LETTER
Patient Name: Sheron Mai  YOB: 1960          MRN :  [id number]  Date:  4/18/2023  Referring Physician:  Eris Costa    Progress Summary  Pt has attended 10 visits in Physical Therapy. Diagnosis:   R TKR        Referring Provider: Aung Jung Date of Evaluation:    3/17/23    Precautions:  Drug Allergy Next MD visit:   4/30/23  Date of Surgery: 3/15/23   Insurance Primary/Secondary: N/A / N/A     # Auth Visits: Medical Necessity, Progress notes every 10 visits            Subjective: Feel better overall, but still have difficulty sleeping at night due to nighttime discomfort. Pain: 2/10       Objective:       AROM:    Knee   4/18/23    Flexion: R 122; L 125   Extension: R -3; L 0       AROM:    Knee   4/18/23    Flexion: R 127; L 125   Extension: R -1; L 0       Strength/MMT: (* denotes performed with pain)  Hip Knee   Flexion: R 5/5; L 5/5  Extension: R 4+/5; L 4+/5  Abduction: R 4+/5; L 4+/5  ER: R 4+/5; L 4+/5  IR: R 5/5; L 5/5 Flexion: R 4+/5; L 5/5  Extension: R 4/5; L 4+/5        SLS: 30\" B    Assessment: Very good flexion gains, extension progress has slowed but still under 5 degrees. Strength and proprioception progressing as expected. Alessio Rivas is making consistent gains with functional improvements at home with ADLs. She still not physically ready to return to work as , but making strides towards RTW goals.      Goals:   (To be met in 20 visits)   Pt will improve knee extension ROM to 0 deg to allow proper heel strike during gait and terminal knee extension in stance- On going  Pt will improve knee AROM flexion to >115 degrees to improve ability to perform squatting to retreive objects from the floor-Met  Pt will improve quad strength to 5/5 to ascend 1 flight of stairs reciprocally without UE assist-On going  Pt will increase hip and knee strength to grossly 4+/5 to be able to get up and down from the floor safely-Met  Pt will demonstrate increased hip ER/ABD strength to 5/5 to perform stepping and squatting activities without excessive femoral IR/ADD-On going  Pt will improve SLS to >20s to improve safety and independence with gait on uneven surfaces such as grass-Met  Pt will be independent and compliant with comprehensive HEP to maintain progress achieved in PT-Met      Post LEFS Score  Post LEFS Score: 82.5 % (4/18/2023  9:12 AM)    41.25 % improvement    Plan: Continue skilled Physical Therapy 2 x/week or a total of 10 visits over a 90 day period. Treatment will include: man therapy, therapeutic ex, neuro re-ed       Patient/Family/Caregiver was advised of these findings, precautions, and treatment options and has agreed to actively participate in planning and for this course of care. Thank you for your referral. If you have any questions, please contact me at Dept: [phone]. Sincerely,  Electronically signed by therapist: Dunia Johnston PT     Physician's certification required:  Yes  Please co-sign or sign and return this letter via fax as soon as possible to [phone]. I certify the need for these services furnished under this plan of treatment and while under my care. X___________________________________________________ Date____________________    Certification From: 8/47/8861  To:7/17/2023 21st Century Cures Act Notice to Patient: Medical documents like this are made available to patients in the interest of transparency. However, be advised this is a medical document and it is intended as bdes-dh-gxcf communication between your medical providers. This medical document may contain abbreviations, assessments, medical data, and results or other terms that are unfamiliar. Medical documents are intended to carry relevant information, facts as evident, and the clinical opinion of the practitioner. As such, this medical document may be written in language that appears blunt or direct.  You are encouraged to contact your medical provider and/or [facility] Patient Experience if you have any questions about this medical document.

## (undated) NOTE — LETTER
01/08/20        Recardo Thiagoo  [address] [address]      Dear Yanci Ricks records indicate that you have outstanding lab work and or testing that was ordered for you and has not yet been completed:  Orders Placed This Encounter

## (undated) NOTE — Clinical Note
I saw Alessio Rob in the office, she presents with abnormal MRI and ultrasound of the right breast with suspicious lesions at the 9 and 10:00 positions. I am recommending ultrasound-guided core needle biopsy of both lesions. Patient has been given [place] have [place] [facility] [place] to schedule appointment.   Thank you Yara Delgado

## (undated) NOTE — LETTER
OUTSIDE TESTING RESULT REQUEST     IMPORTANT: FOR YOUR IMMEDIATE ATTENTION  Please FAX all test results listed below to: [phone]     Testing already done on or about: Appointment 2024 @ 1120   Ordered in Epic    * * * * If testing is NOT complete, arrange with patient A.S.A.P. * * * *      Patient Name: Criss Murphy  Surgery Date: 10/16/2024  Medical Record: [id number]  CSN: [id number]  : 1960 - A: 64 y     Sex: female  Surgeon(s):  Kiet Greene MD  Procedure: LEFT TOTAL KNEE ARTHROPLASTY  Anesthesia Type: Choice     Surgeon: Kiet Greene MD     The following Testing and Time Line are REQUIRED PER ANESTHESIA     EKG READ AND SIGNED WITHIN   90 days      Thank You,   Sent by: Rosanne DEL RIO

## (undated) NOTE — MR AVS SNAPSHOT
After Visit Summary   8/14/2020    Ruben Dela Cruz    MRN: [id number]           Visit Information     Date & Time  8/14/2020  9:00 AM Provider  CHAMP Arias [facility] [address] [facility].  Phone ASSAY, THYROID STIM HORMONE [[id number] CUSTOM]     CBC WITH DIFFERENTIAL WITH PLATELET [2127339 CUSTOM]     CELIAC DISEASE COMPREHENSIVE [9462507 CPT(R)]     COMP METABOLIC PANEL (14) [3876708 CUSTOM]     HPV HIGH RISK , THIN PREP COLLECTION [[id number] CUSTOM Visit face-to-face with a [facility] physician or   [facility] using your mobile device or computer   using [address] with a [facility] Physician or [facility] online. The physician will respond and provide   a treatment plan within a few hours.  ONLINE VISIT

## (undated) NOTE — LETTER
ATTENTION: ROGERS    Patient Name: Criss Murphy  Surgery Date: 10/16/2024  Medical Record: [id number] CSN: [id number]      Surgeon(s):  Kiet Greene MD  Consent Procedure: LEFT TOTAL KNEE ARTHROPLASTY  Anesthesia Type: Choice      PLEASE CHECK THE BOX FOR MSSA/MRSA ON THE SURGERY SCHEDULING SHEET AND RESUBMIT.      THANK YOU,  PRE-ADMISSION TESTING

## (undated) NOTE — Clinical Note
04/04/2017          To Whom It May Concern,    Due to medical illness, please excuse Laura Shikha from work until 4/11/17.     Sincerely,    Mihaela Dove D.O., FAAFP